# Patient Record
Sex: FEMALE | Race: WHITE | Employment: UNEMPLOYED | URBAN - METROPOLITAN AREA
[De-identification: names, ages, dates, MRNs, and addresses within clinical notes are randomized per-mention and may not be internally consistent; named-entity substitution may affect disease eponyms.]

---

## 2018-09-05 ENCOUNTER — OFFICE VISIT (OUTPATIENT)
Dept: OBGYN | Age: 63
End: 2018-09-05

## 2018-09-05 ENCOUNTER — HOSPITAL ENCOUNTER (OUTPATIENT)
Age: 63
Setting detail: SPECIMEN
Discharge: HOME | End: 2018-09-05

## 2018-09-05 VITALS
BODY MASS INDEX: 20.83 KG/M2 | DIASTOLIC BLOOD PRESSURE: 60 MMHG | TEMPERATURE: 98.4 F | SYSTOLIC BLOOD PRESSURE: 130 MMHG | WEIGHT: 122 LBS | HEART RATE: 78 BPM | HEIGHT: 64 IN

## 2018-09-05 DIAGNOSIS — Z90.710 PAP SMEAR OF VAGINA WITH PREVIOUS HYSTERECTOMY FOR CANCER: ICD-10-CM

## 2018-09-05 DIAGNOSIS — Z12.31 ENCOUNTER FOR MAMMOGRAM TO ESTABLISH BASELINE MAMMOGRAM: ICD-10-CM

## 2018-09-05 DIAGNOSIS — Z01.419 ENCNTR FOR GYN EXAM (GENERAL) (ROUTINE) W/O ABN FINDINGS: Primary | ICD-10-CM

## 2018-09-05 DIAGNOSIS — Z08 PAP SMEAR OF VAGINA WITH PREVIOUS HYSTERECTOMY FOR CANCER: ICD-10-CM

## 2018-09-05 PROCEDURE — 99396 PREV VISIT EST AGE 40-64: CPT | Performed by: OBSTETRICS & GYNECOLOGY

## 2018-09-05 PROCEDURE — 88175 CYTOPATH C/V AUTO FLUID REDO: CPT

## 2018-09-05 PROCEDURE — 99213 OFFICE O/P EST LOW 20 MIN: CPT | Performed by: OBSTETRICS & GYNECOLOGY

## 2018-09-05 NOTE — PROGRESS NOTES
Chief complaint : 58 year- old presents for well woman exam.     Present illness :    G1, P1,Ab0. Doing well. Periods:  None, hysterectomy. Sexually active : yes . No abdominal or pelvic pain. No dyspareunia. No abnormal vaginal  discharge. Previous Pap smears normal. Last Pap smear 2014 or so, had hysterectomy at [facility] for ERIKA III. Uriah Meza did the TLH< BSO, there was no residual dysplasia according to his notes. No urinary or GI symptoms. Medical/ surgical history and medications reviewed. Has apparently never had a mammogram, will order that. History of TB treated. Translation phone utilized for this entire visit today. ROS negative    Examination :  Vital signs reviewed. GA : Healthy. Oriented x 3 no distress. HEENT : hearing grossly intact, no jaundice, no oral lesions or nasal discharge. Neck : Supple. Thyroid : normal, no goiter. Breasts : no masses. No skin changes or lymphadenopathy. No nipple discharge. Abdomen :Soft. No masses. No organomegaly. V&V : Normal female external genitalia. BUS: Normal.  PS : Adequate. Cervix : Absent, pap smear done of vault, TPP. Uterus : Absent  Adnexa : Clear, no masses.     IMP: Normal Gyne exam, S/P TLH, BSO for erika III    Plan: Mammogram, RTC yearly

## 2018-09-05 NOTE — PROGRESS NOTES
Pt had TPP today She had a hyst 2016 for [id number] We did a vault pap    Language line used for entire visit Mandarin #[id number]  Will need paps yearly and did order a mammogram.

## 2018-09-18 ENCOUNTER — HOSPITAL ENCOUNTER (OUTPATIENT)
Dept: MAMMOGRAPHY | Age: 63
Discharge: HOME | End: 2018-09-20

## 2018-09-18 DIAGNOSIS — Z12.31 ENCOUNTER FOR MAMMOGRAM TO ESTABLISH BASELINE MAMMOGRAM: ICD-10-CM

## 2018-09-18 DIAGNOSIS — Z01.419 ENCNTR FOR GYN EXAM (GENERAL) (ROUTINE) W/O ABN FINDINGS: ICD-10-CM

## 2018-09-18 PROCEDURE — 77067 SCR MAMMO BI INCL CAD: CPT

## 2019-01-14 ENCOUNTER — OFFICE VISIT (OUTPATIENT)
Dept: FAMILY MEDICINE CLINIC | Age: 64
End: 2019-01-14

## 2019-01-14 VITALS
HEIGHT: 64 IN | TEMPERATURE: 98 F | DIASTOLIC BLOOD PRESSURE: 90 MMHG | BODY MASS INDEX: 21 KG/M2 | SYSTOLIC BLOOD PRESSURE: 138 MMHG | WEIGHT: 123 LBS | OXYGEN SATURATION: 97 % | HEART RATE: 75 BPM

## 2019-01-14 DIAGNOSIS — R05.3 CHRONIC COUGH: ICD-10-CM

## 2019-01-14 DIAGNOSIS — K21.9 GASTROESOPHAGEAL REFLUX DISEASE, ESOPHAGITIS PRESENCE NOT SPECIFIED: ICD-10-CM

## 2019-01-14 DIAGNOSIS — J98.4 RESTRICTIVE LUNG DISEASE: ICD-10-CM

## 2019-01-14 DIAGNOSIS — Z12.11 SCREENING FOR COLON CANCER: ICD-10-CM

## 2019-01-14 DIAGNOSIS — R07.9 CHEST PAIN, UNSPECIFIED TYPE: ICD-10-CM

## 2019-01-14 PROCEDURE — 93005 ELECTROCARDIOGRAM TRACING: CPT | Performed by: STUDENT IN AN ORGANIZED HEALTH CARE EDUCATION/TRAINING PROGRAM

## 2019-01-14 PROCEDURE — 99213 OFFICE O/P EST LOW 20 MIN: CPT | Performed by: STUDENT IN AN ORGANIZED HEALTH CARE EDUCATION/TRAINING PROGRAM

## 2019-01-14 PROCEDURE — 93010 ELECTROCARDIOGRAM REPORT: CPT | Performed by: STUDENT IN AN ORGANIZED HEALTH CARE EDUCATION/TRAINING PROGRAM

## 2019-01-14 RX ORDER — OMEPRAZOLE 20 MG/1
20 CAPSULE, DELAYED RELEASE ORAL
Qty: 30 CAPSULE | Refills: 0 | Status: SHIPPED | OUTPATIENT
Start: 2019-01-14 | End: 2019-02-11 | Stop reason: SDUPTHER

## 2019-01-14 ASSESSMENT — PATIENT HEALTH QUESTIONNAIRE - PHQ9
1. LITTLE INTEREST OR PLEASURE IN DOING THINGS: 0
SUM OF ALL RESPONSES TO PHQ QUESTIONS 1-9: 0
SUM OF ALL RESPONSES TO PHQ QUESTIONS 1-9: 0
SUM OF ALL RESPONSES TO PHQ9 QUESTIONS 1 & 2: 0
2. FEELING DOWN, DEPRESSED OR HOPELESS: 0

## 2019-01-18 ASSESSMENT — ENCOUNTER SYMPTOMS
WHEEZING: 0
NAUSEA: 0
COUGH: 1
CHEST TIGHTNESS: 0
CONSTIPATION: 0
SHORTNESS OF BREATH: 0
DIARRHEA: 0
VOMITING: 0

## 2019-01-22 ENCOUNTER — HOSPITAL ENCOUNTER (OUTPATIENT)
Age: 64
Setting detail: SPECIMEN
Discharge: HOME | End: 2019-01-22

## 2019-01-22 DIAGNOSIS — Z12.11 SCREENING FOR COLON CANCER: ICD-10-CM

## 2019-01-22 LAB
CONTROL: POSITIVE
HEMOCCULT STL QL: NEGATIVE

## 2019-01-22 PROCEDURE — 82274 ASSAY TEST FOR BLOOD FECAL: CPT

## 2019-02-11 DIAGNOSIS — K21.9 GASTROESOPHAGEAL REFLUX DISEASE, ESOPHAGITIS PRESENCE NOT SPECIFIED: ICD-10-CM

## 2019-02-11 RX ORDER — OMEPRAZOLE 20 MG/1
20 CAPSULE, DELAYED RELEASE ORAL
Qty: 30 CAPSULE | Refills: 0 | Status: SHIPPED | OUTPATIENT
Start: 2019-02-11 | End: 2019-09-27

## 2019-03-26 ENCOUNTER — HOSPITAL ENCOUNTER (EMERGENCY)
Age: 64
Discharge: HOME | End: 2019-03-27
Attending: EMERGENCY MEDICINE

## 2019-03-26 ENCOUNTER — APPOINTMENT (OUTPATIENT)
Dept: CT IMAGING | Age: 64
End: 2019-03-26

## 2019-03-26 DIAGNOSIS — N39.0 URINARY TRACT INFECTION IN FEMALE: Primary | ICD-10-CM

## 2019-03-26 DIAGNOSIS — R55 NEAR SYNCOPE: ICD-10-CM

## 2019-03-26 LAB
ALBUMIN SERPL-MCNC: 4.4 G/DL (ref 3.5–5.2)
ALP BLD-CCNC: 113 U/L (ref 35–104)
ALT SERPL-CCNC: 13 U/L (ref 0–32)
ANION GAP SERPL CALCULATED.3IONS-SCNC: 10 MMOL/L (ref 7–16)
AST SERPL-CCNC: 24 U/L (ref 0–31)
BACTERIA: ABNORMAL /HPF
BASOPHILS ABSOLUTE: 0.04 E9/L (ref 0–0.2)
BASOPHILS RELATIVE PERCENT: 0.5 % (ref 0–2)
BILIRUB SERPL-MCNC: 0.3 MG/DL (ref 0–1.2)
BILIRUBIN URINE: NEGATIVE
BLOOD, URINE: NEGATIVE
BUN BLDV-MCNC: 13 MG/DL (ref 8–23)
CALCIUM SERPL-MCNC: 9.2 MG/DL (ref 8.6–10.2)
CHLORIDE BLD-SCNC: 101 MMOL/L (ref 98–107)
CLARITY: CLEAR
CO2: 28 MMOL/L (ref 22–29)
COLOR: YELLOW
CREAT SERPL-MCNC: 0.5 MG/DL (ref 0.5–1)
EOSINOPHILS ABSOLUTE: 0.07 E9/L (ref 0.05–0.5)
EOSINOPHILS RELATIVE PERCENT: 0.9 % (ref 0–6)
GFR AFRICAN AMERICAN: >60
GFR NON-AFRICAN AMERICAN: >60 ML/MIN/1.73
GLUCOSE BLD-MCNC: 123 MG/DL (ref 74–99)
GLUCOSE URINE: NEGATIVE MG/DL
HCT VFR BLD CALC: 41.9 % (ref 34–48)
HEMOGLOBIN: 13.5 G/DL (ref 11.5–15.5)
IMMATURE GRANULOCYTES #: 0.03 E9/L
IMMATURE GRANULOCYTES %: 0.4 % (ref 0–5)
KETONES, URINE: ABNORMAL MG/DL
LEUKOCYTE ESTERASE, URINE: ABNORMAL
LYMPHOCYTES ABSOLUTE: 1.63 E9/L (ref 1.5–4)
LYMPHOCYTES RELATIVE PERCENT: 20 % (ref 20–42)
MAGNESIUM: 2.4 MG/DL (ref 1.6–2.6)
MCH RBC QN AUTO: 29.7 PG (ref 26–35)
MCHC RBC AUTO-ENTMCNC: 32.2 % (ref 32–34.5)
MCV RBC AUTO: 92.3 FL (ref 80–99.9)
MONOCYTES ABSOLUTE: 0.4 E9/L (ref 0.1–0.95)
MONOCYTES RELATIVE PERCENT: 4.9 % (ref 2–12)
NEUTROPHILS ABSOLUTE: 5.97 E9/L (ref 1.8–7.3)
NEUTROPHILS RELATIVE PERCENT: 73.3 % (ref 43–80)
NITRITE, URINE: NEGATIVE
PDW BLD-RTO: 13.1 FL (ref 11.5–15)
PH UA: 6.5 (ref 5–9)
PLATELET # BLD: 308 E9/L (ref 130–450)
PMV BLD AUTO: 11.3 FL (ref 7–12)
POTASSIUM SERPL-SCNC: 4.7 MMOL/L (ref 3.5–5)
PROTEIN UA: NEGATIVE MG/DL
RBC # BLD: 4.54 E12/L (ref 3.5–5.5)
RBC UA: ABNORMAL /HPF (ref 0–2)
SODIUM BLD-SCNC: 139 MMOL/L (ref 132–146)
SPECIFIC GRAVITY UA: 1.02 (ref 1–1.03)
TOTAL PROTEIN: 7.9 G/DL (ref 6.4–8.3)
TROPONIN: <0.01 NG/ML (ref 0–0.03)
UROBILINOGEN, URINE: 1 E.U./DL
WBC # BLD: 8.1 E9/L (ref 4.5–11.5)
WBC UA: ABNORMAL /HPF (ref 0–5)

## 2019-03-26 PROCEDURE — 83735 ASSAY OF MAGNESIUM: CPT

## 2019-03-26 PROCEDURE — 80053 COMPREHEN METABOLIC PANEL: CPT

## 2019-03-26 PROCEDURE — 85025 COMPLETE CBC W/AUTO DIFF WBC: CPT

## 2019-03-26 PROCEDURE — 36415 COLL VENOUS BLD VENIPUNCTURE: CPT

## 2019-03-26 PROCEDURE — 84484 ASSAY OF TROPONIN QUANT: CPT

## 2019-03-26 PROCEDURE — 70450 CT HEAD/BRAIN W/O DYE: CPT

## 2019-03-26 PROCEDURE — 81001 URINALYSIS AUTO W/SCOPE: CPT

## 2019-03-26 PROCEDURE — 99284 EMERGENCY DEPT VISIT MOD MDM: CPT

## 2019-03-26 PROCEDURE — 93005 ELECTROCARDIOGRAM TRACING: CPT | Performed by: NURSE PRACTITIONER

## 2019-03-27 VITALS
SYSTOLIC BLOOD PRESSURE: 138 MMHG | OXYGEN SATURATION: 96 % | DIASTOLIC BLOOD PRESSURE: 70 MMHG | BODY MASS INDEX: 19.99 KG/M2 | WEIGHT: 120 LBS | HEIGHT: 65 IN | HEART RATE: 68 BPM | TEMPERATURE: 97.6 F | RESPIRATION RATE: 16 BRPM

## 2019-03-27 RX ORDER — CEPHALEXIN 500 MG/1
500 CAPSULE ORAL 2 TIMES DAILY
Qty: 14 CAPSULE | Refills: 0 | Status: SHIPPED | OUTPATIENT
Start: 2019-03-27 | End: 2019-04-03

## 2019-03-29 LAB
EKG ATRIAL RATE: 65 BPM
EKG P AXIS: 52 DEGREES
EKG P-R INTERVAL: 172 MS
EKG Q-T INTERVAL: 430 MS
EKG QRS DURATION: 86 MS
EKG QTC CALCULATION (BAZETT): 447 MS
EKG R AXIS: -15 DEGREES
EKG T AXIS: 28 DEGREES
EKG VENTRICULAR RATE: 65 BPM

## 2019-04-08 ENCOUNTER — OFFICE VISIT (OUTPATIENT)
Dept: PULMONOLOGY | Age: 64
End: 2019-04-08

## 2019-04-08 VITALS
WEIGHT: 121 LBS | OXYGEN SATURATION: 91 % | SYSTOLIC BLOOD PRESSURE: 135 MMHG | RESPIRATION RATE: 18 BRPM | HEART RATE: 75 BPM | HEIGHT: 63 IN | BODY MASS INDEX: 21.44 KG/M2 | TEMPERATURE: 97.5 F | DIASTOLIC BLOOD PRESSURE: 63 MMHG

## 2019-04-08 DIAGNOSIS — J98.4 RESTRICTIVE LUNG DISEASE: ICD-10-CM

## 2019-04-08 DIAGNOSIS — Z60.3 IMMIGRANT WITH LANGUAGE DIFFICULTY: ICD-10-CM

## 2019-04-08 DIAGNOSIS — J92.0 ASBESTOS-INDUCED PLEURAL PLAQUE: ICD-10-CM

## 2019-04-08 DIAGNOSIS — R05.3 CHRONIC COUGH: ICD-10-CM

## 2019-04-08 DIAGNOSIS — J44.9 CHRONIC OBSTRUCTIVE PULMONARY DISEASE, UNSPECIFIED COPD TYPE (HCC): Primary | ICD-10-CM

## 2019-04-08 LAB
EXPIRATORY TIME: NORMAL SEC
FEF 25-75% %PRED-PRE: NORMAL L/SEC
FEF 25-75% PRED: NORMAL L/SEC
FEF 25-75%-PRE: NORMAL L/SEC
FEV1 %PRED-PRE: 91 %
FEV1 PRED: 2.13 L
FEV1/FVC %PRED-PRE: 111 %
FEV1/FVC PRED: 80 %
FEV1/FVC: 89 %
FEV1: 1.96 L
FVC %PRED-PRE: 82 %
FVC PRED: 2.68 L
FVC: 2.2 L
PEF %PRED-PRE: NORMAL L/SEC
PEF PRED: NORMAL L/SEC
PEF-PRE: NORMAL L/SEC

## 2019-04-08 PROCEDURE — 99203 OFFICE O/P NEW LOW 30 MIN: CPT | Performed by: INTERNAL MEDICINE

## 2019-04-08 PROCEDURE — 94010 BREATHING CAPACITY TEST: CPT | Performed by: INTERNAL MEDICINE

## 2019-04-08 PROCEDURE — 99204 OFFICE O/P NEW MOD 45 MIN: CPT | Performed by: INTERNAL MEDICINE

## 2019-04-08 SDOH — SOCIAL STABILITY - SOCIAL INSECURITY: ACCULTURATION DIFFICULTY: Z60.3

## 2019-04-08 ASSESSMENT — PULMONARY FUNCTION TESTS
FEV1: 1.96
FVC_PREDICTED: 2.68
FEV1/FVC_PERCENT_PREDICTED_PRE: 111
FEV1/FVC: 89
FVC_PERCENT_PREDICTED_PRE: 82
FVC: 2.20
FEV1/FVC_PREDICTED: 80
FEV1_PREDICTED: 2.13
FEV1_PERCENT_PREDICTED_PRE: 91

## 2019-04-08 NOTE — PROGRESS NOTES
Department of Internal Medicine  Division of Pulmonary, Critical Care & Sleep Medicine  Pulmonary [address] [facility]                                             Pulmonary Clinic Consult     I had the pleasure of seeing  Elke Brooks in the [facility] regarding their history of asbestosis     Chief Complaint   Patient presents with    Establish Care    Cough       HISTORY OF PRESENT ILLNESS:    Elke Brooks is a 58y.o. year old  Who never smoke or seconds smoke around her     The Patient comes in with SOB that has been going on the last few years and she had work up 2 years ago for TB and asbestsos and she has chronic     She  has cough ,productive for clear  Sputum and it is more in the     She had  has + PPD,abnormal CT/CXR with cough. I. The patients' cough has not been productive of any sputum or blood. She has had no fever noted. She has had no associated  sore throat, HA, ear discomfort. . She does not smoke tobacco. She has been in the [place] for quite some time, she has had no recent travel. Her family member is concerned as her cough has been persistent and brings her to [facility] for order for an outpatient CXR. The CXR was reported as abnormal and thus a CT scan was performed. The CT scan was reviewed and show pleural plaque without adenopathy. Using a  we note that she has not TB exposure or history. She from [place]. On questioning she was a  for 30 + years with mainly soft metals. Her  however is a  in [place] for 40 + years and has significant exposure to asbestos exposure. 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 She has had the cough for 10 years.  No GERD    She has CT scan shows multiple plaques and also bronchoscopy done with bacterial growth ,negative AFB         ALLERGIES:  No Known Allergies    PAST MEDICAL HISTORY:       Diagnosis Date    muscles intact. External canals are patent and no discharge was appreciated. Septum was midline,   mucosa was without erythema, exudates or cobblestoning. No thrush was noted. Neck: Supple without thyromegaly. No elevated JVP. Trachea was midline. No carotid bruits were auscultated. Respiratory: rales   Cardiovascular: Regular without murmur, clicks, gallops or rubs. There is no left or right ventricular heave. Pulses:  Carotid, radial and femoral pulses were equally bilaterally. Abdomen: Slightly rounded and soft without organomegaly. No rebound, rigidity or guarding was appreciated. Lymphatic: No lymphadenopathy. Musculoskeletal: no joint deformity     Extremities:no edema   Skin:  Warm and dry. Good color, turgor and pigmentation. No lesions or scars. Neurological/Psychiatric: The patient's general behavior, level of consciousness, thought content and emotional status is normal.  Cranial nerves II-XII are intact. DATA:                 IMPRESSION:    1-Asbestosis   2-multpie nodules    3-calcified plaques   4-Positive PPD             PLAN:      -T spot test   -repeat CT chest   -for cough already tried inhalers and did not help   _ will do eosinophilic ,.Sed rate ,IgE and see her in 4 months     Flu and Pneumovax     Thank you for allowing me to participate in [facility]. I will keep following with you ,should you have any concerns ,please contact me at [phone]     Sincerely,        Sha Boothe MD  Pulmonary & Critical Care Medicine     NOTE: This report was transcribed using voice recognition software. Every effort was made to ensure accuracy; however, inadvertent computerized transcription errors may be present.

## 2019-04-08 NOTE — PROGRESS NOTES
New pt in office today to see Dr. Jose Ogden for Restrictive Lung disease;chronic cough. Pt traveling out of country to [place] in 1 week thru end of August. Requesting Chest CT and labwork be done post trip. Office to arrange CT Chest for early Sept; will mail appointment date/time letter to pt home. To follow up appt in office in Later Sept. Appt given for 9/23/19 for follow up in office.

## 2019-07-03 ENCOUNTER — TELEPHONE (OUTPATIENT)
Dept: PULMONOLOGY | Age: 64
End: 2019-07-03

## 2019-09-05 ENCOUNTER — OFFICE VISIT (OUTPATIENT)
Dept: OBGYN | Age: 64
End: 2019-09-05

## 2019-09-05 ENCOUNTER — HOSPITAL ENCOUNTER (OUTPATIENT)
Age: 64
Discharge: HOME | End: 2019-09-07

## 2019-09-05 VITALS
SYSTOLIC BLOOD PRESSURE: 138 MMHG | HEART RATE: 75 BPM | RESPIRATION RATE: 16 BRPM | TEMPERATURE: 98.2 F | DIASTOLIC BLOOD PRESSURE: 70 MMHG | WEIGHT: 122 LBS | BODY MASS INDEX: 21.61 KG/M2

## 2019-09-05 DIAGNOSIS — R07.89 CHEST DISCOMFORT: ICD-10-CM

## 2019-09-05 DIAGNOSIS — Z01.419 ENCNTR FOR GYN EXAM (GENERAL) (ROUTINE) W/O ABN FINDINGS: Primary | ICD-10-CM

## 2019-09-05 DIAGNOSIS — Z90.710 PAP SMEAR OF VAGINA WITH PREVIOUS HYSTERECTOMY FOR CANCER: ICD-10-CM

## 2019-09-05 DIAGNOSIS — Z08 PAP SMEAR OF VAGINA WITH PREVIOUS HYSTERECTOMY FOR CANCER: ICD-10-CM

## 2019-09-05 DIAGNOSIS — Z12.31 ENCOUNTER FOR SCREENING MAMMOGRAM FOR BREAST CANCER: ICD-10-CM

## 2019-09-05 PROCEDURE — 99213 OFFICE O/P EST LOW 20 MIN: CPT | Performed by: OBSTETRICS & GYNECOLOGY

## 2019-09-05 PROCEDURE — 88175 CYTOPATH C/V AUTO FLUID REDO: CPT

## 2019-09-05 PROCEDURE — 99397 PER PM REEVAL EST PAT 65+ YR: CPT | Performed by: OBSTETRICS & GYNECOLOGY

## 2019-09-05 NOTE — PROGRESS NOTES
cyracom I pad for mandarin used for entire visit.  number [id number]  Assisted with pelvic exam, pap smear obtained, labeled @ and hand delivered to lab. Mammogram rx faxed to [facility]. REFERRAL MADE FOR im clinic. Appointment given for 9/27/19. Discharge instructions given by dr Musa Novak.

## 2019-09-05 NOTE — PROGRESS NOTES
Chief complaint : 58 year- old presents for well woman exam.     Present illness :    G1, P1,Ab0. Doing well. Periods:  None, hysterectomy. Sexually active : no  No abdominal or pelvic pain. No dyspareunia. No abnormal vaginal  discharge. Previous Pap smears normal. Last Pap smear 2018, had hysterectomy at [facility] for ERIKA III. Ky Zamudio Every did the TLH< BSO, there was no residual dysplasia according to his notes. No urinary or GI symptoms. Medical/ surgical history and medications reviewed. Mammogram due, will order that. History of TB treated. Translation phone utilized for this entire visit today. ROS: Occasional chest discomfort, will make medical appt. Examination :  Vital signs reviewed. GA : Healthy. Oriented x 3 no distress. HEENT : hearing grossly intact, no jaundice, no oral lesions or nasal discharge. Neck : Supple. Thyroid : normal, no goiter. Breasts : no masses. No skin changes or lymphadenopathy. No nipple discharge. Abdomen :Soft. No masses. No organomegaly. V&V : Normal female external genitalia. BUS: Normal.  PS : Adequate. Cervix : Absent, pap smear done of vault, TPP. Uterus : Absent  Adnexa : Clear, no masses. IMP: Normal Gyne exam, S/P TLH, BSO for erika III    Plan: Mammogram, RTC yearly, Continue yearly pap smears of vault. Refer to IM for chest discomfort.

## 2019-09-23 ENCOUNTER — HOSPITAL ENCOUNTER (OUTPATIENT)
Dept: MAMMOGRAPHY | Age: 64
Discharge: HOME | End: 2019-09-25

## 2019-09-23 DIAGNOSIS — Z01.419 ENCNTR FOR GYN EXAM (GENERAL) (ROUTINE) W/O ABN FINDINGS: ICD-10-CM

## 2019-09-23 DIAGNOSIS — Z12.31 ENCOUNTER FOR SCREENING MAMMOGRAM FOR BREAST CANCER: ICD-10-CM

## 2019-09-23 PROCEDURE — 77067 SCR MAMMO BI INCL CAD: CPT

## 2019-09-27 ENCOUNTER — HOSPITAL ENCOUNTER (OUTPATIENT)
Age: 64
Discharge: HOME | End: 2019-09-27

## 2019-09-27 ENCOUNTER — OFFICE VISIT (OUTPATIENT)
Dept: INTERNAL MEDICINE | Age: 64
End: 2019-09-27

## 2019-09-27 VITALS
BODY MASS INDEX: 22.21 KG/M2 | HEART RATE: 70 BPM | TEMPERATURE: 98.2 F | RESPIRATION RATE: 16 BRPM | DIASTOLIC BLOOD PRESSURE: 81 MMHG | WEIGHT: 120.7 LBS | HEIGHT: 62 IN | SYSTOLIC BLOOD PRESSURE: 166 MMHG

## 2019-09-27 DIAGNOSIS — R74.8 ELEVATED ALKALINE PHOSPHATASE LEVEL: ICD-10-CM

## 2019-09-27 DIAGNOSIS — J92.0 ASBESTOS-INDUCED PLEURAL PLAQUE: ICD-10-CM

## 2019-09-27 DIAGNOSIS — R91.1 LUNG NODULE: ICD-10-CM

## 2019-09-27 DIAGNOSIS — R73.9 HYPERGLYCEMIA: ICD-10-CM

## 2019-09-27 DIAGNOSIS — J92.0 PLEURAL PLAQUE DUE TO ASBESTOS EXPOSURE: ICD-10-CM

## 2019-09-27 DIAGNOSIS — R74.8 ELEVATED ALKALINE PHOSPHATASE LEVEL: Primary | ICD-10-CM

## 2019-09-27 DIAGNOSIS — I10 ESSENTIAL HYPERTENSION: ICD-10-CM

## 2019-09-27 LAB
ALBUMIN SERPL-MCNC: 4.6 G/DL (ref 3.5–5.2)
ALP BLD-CCNC: 89 U/L (ref 35–104)
ALT SERPL-CCNC: 11 U/L (ref 0–32)
ANION GAP SERPL CALCULATED.3IONS-SCNC: 21 MMOL/L (ref 7–16)
AST SERPL-CCNC: 16 U/L (ref 0–31)
BILIRUB SERPL-MCNC: 0.5 MG/DL (ref 0–1.2)
BUN BLDV-MCNC: 14 MG/DL (ref 8–23)
CALCIUM SERPL-MCNC: 10 MG/DL (ref 8.6–10.2)
CHLORIDE BLD-SCNC: 102 MMOL/L (ref 98–107)
CO2: 24 MMOL/L (ref 22–29)
CREAT SERPL-MCNC: 0.6 MG/DL (ref 0.5–1)
EOSINOPHILS ABSOLUTE COUNT: 100 /UL (ref 50–250)
GAMMA GLUTAMYL TRANSFERASE: 14 U/L (ref 6–42)
GFR AFRICAN AMERICAN: >60
GFR NON-AFRICAN AMERICAN: >60 ML/MIN/1.73
GLUCOSE BLD-MCNC: 104 MG/DL (ref 74–99)
HBA1C MFR BLD: 5.7 %
POTASSIUM SERPL-SCNC: 4.8 MMOL/L (ref 3.5–5)
SODIUM BLD-SCNC: 147 MMOL/L (ref 132–146)
TOTAL PROTEIN: 8.6 G/DL (ref 6.4–8.3)

## 2019-09-27 PROCEDURE — 83036 HEMOGLOBIN GLYCOSYLATED A1C: CPT | Performed by: INTERNAL MEDICINE

## 2019-09-27 PROCEDURE — 82785 ASSAY OF IGE: CPT

## 2019-09-27 PROCEDURE — 82977 ASSAY OF GGT: CPT

## 2019-09-27 PROCEDURE — 99202 OFFICE O/P NEW SF 15 MIN: CPT | Performed by: INTERNAL MEDICINE

## 2019-09-27 PROCEDURE — 80053 COMPREHEN METABOLIC PANEL: CPT

## 2019-09-27 PROCEDURE — 99204 OFFICE O/P NEW MOD 45 MIN: CPT | Performed by: INTERNAL MEDICINE

## 2019-09-27 PROCEDURE — 36415 COLL VENOUS BLD VENIPUNCTURE: CPT

## 2019-09-27 PROCEDURE — 85048 AUTOMATED LEUKOCYTE COUNT: CPT

## 2019-09-27 RX ORDER — AMLODIPINE BESYLATE 5 MG/1
5 TABLET ORAL DAILY
Qty: 30 TABLET | Refills: 3 | Status: SHIPPED | OUTPATIENT
Start: 2019-09-27 | End: 2019-10-28 | Stop reason: SINTOL

## 2019-09-27 ASSESSMENT — ENCOUNTER SYMPTOMS
VOMITING: 0
SORE THROAT: 0
PHOTOPHOBIA: 0
BACK PAIN: 0
WHEEZING: 0
NAUSEA: 0
DIARRHEA: 0
BLOOD IN STOOL: 0
COUGH: 0
ABDOMINAL PAIN: 0
CONSTIPATION: 0
SHORTNESS OF BREATH: 0

## 2019-09-30 ENCOUNTER — HOSPITAL ENCOUNTER (OUTPATIENT)
Age: 64
Discharge: HOME | End: 2019-09-30

## 2019-09-30 DIAGNOSIS — J92.0 ASBESTOS-INDUCED PLEURAL PLAQUE: ICD-10-CM

## 2019-09-30 LAB — IGE: 24 KU/L

## 2019-09-30 PROCEDURE — 36415 COLL VENOUS BLD VENIPUNCTURE: CPT

## 2019-09-30 PROCEDURE — 86481 TB AG RESPONSE T-CELL SUSP: CPT

## 2019-10-03 LAB
COMMENT: NORMAL
REPORT: NORMAL

## 2019-10-10 ENCOUNTER — HOSPITAL ENCOUNTER (OUTPATIENT)
Dept: CT IMAGING | Age: 64
Discharge: HOME | End: 2019-10-12

## 2019-10-10 DIAGNOSIS — J92.0 ASBESTOS-INDUCED PLEURAL PLAQUE: ICD-10-CM

## 2019-10-10 PROCEDURE — 71250 CT THORAX DX C-: CPT

## 2019-10-28 ENCOUNTER — OFFICE VISIT (OUTPATIENT)
Dept: INTERNAL MEDICINE | Age: 64
End: 2019-10-28

## 2019-10-28 VITALS
RESPIRATION RATE: 20 BRPM | TEMPERATURE: 98.3 F | SYSTOLIC BLOOD PRESSURE: 136 MMHG | HEART RATE: 76 BPM | DIASTOLIC BLOOD PRESSURE: 80 MMHG | WEIGHT: 122 LBS | HEIGHT: 63 IN | BODY MASS INDEX: 21.62 KG/M2

## 2019-10-28 DIAGNOSIS — A15.9 TB (TUBERCULOSIS): Primary | ICD-10-CM

## 2019-10-28 PROCEDURE — 99212 OFFICE O/P EST SF 10 MIN: CPT | Performed by: INTERNAL MEDICINE

## 2019-10-28 ASSESSMENT — ENCOUNTER SYMPTOMS
COUGH: 0
BACK PAIN: 0
SORE THROAT: 0
SHORTNESS OF BREATH: 0
ABDOMINAL PAIN: 0

## 2019-11-07 ENCOUNTER — TELEPHONE (OUTPATIENT)
Dept: INTERNAL MEDICINE | Age: 64
End: 2019-11-07

## 2019-12-19 ENCOUNTER — OFFICE VISIT (OUTPATIENT)
Dept: PULMONOLOGY | Age: 64
End: 2019-12-19

## 2019-12-19 VITALS
RESPIRATION RATE: 18 BRPM | WEIGHT: 119 LBS | HEIGHT: 63 IN | SYSTOLIC BLOOD PRESSURE: 125 MMHG | DIASTOLIC BLOOD PRESSURE: 59 MMHG | BODY MASS INDEX: 21.09 KG/M2 | HEART RATE: 87 BPM | OXYGEN SATURATION: 95 %

## 2019-12-19 DIAGNOSIS — J98.4 RESTRICTIVE LUNG DISEASE: ICD-10-CM

## 2019-12-19 DIAGNOSIS — R05.3 CHRONIC COUGH: ICD-10-CM

## 2019-12-19 DIAGNOSIS — R91.1 LUNG NODULE: ICD-10-CM

## 2019-12-19 DIAGNOSIS — Z22.7 TB LUNG, LATENT: Primary | ICD-10-CM

## 2019-12-19 PROCEDURE — 99214 OFFICE O/P EST MOD 30 MIN: CPT | Performed by: INTERNAL MEDICINE

## 2019-12-19 PROCEDURE — 99213 OFFICE O/P EST LOW 20 MIN: CPT | Performed by: INTERNAL MEDICINE

## 2020-01-06 ENCOUNTER — OFFICE VISIT (OUTPATIENT)
Dept: INTERNAL MEDICINE | Age: 65
End: 2020-01-06

## 2020-01-06 VITALS
DIASTOLIC BLOOD PRESSURE: 65 MMHG | HEIGHT: 64 IN | BODY MASS INDEX: 20.81 KG/M2 | TEMPERATURE: 98.2 F | HEART RATE: 87 BPM | RESPIRATION RATE: 16 BRPM | WEIGHT: 121.9 LBS | SYSTOLIC BLOOD PRESSURE: 137 MMHG

## 2020-01-06 PROBLEM — I10 ESSENTIAL HYPERTENSION: Status: RESOLVED | Noted: 2019-09-27 | Resolved: 2020-01-06

## 2020-01-06 PROBLEM — R76.11 POSITIVE TB TEST: Status: ACTIVE | Noted: 2020-01-06

## 2020-01-06 PROCEDURE — 99212 OFFICE O/P EST SF 10 MIN: CPT | Performed by: INTERNAL MEDICINE

## 2020-01-06 PROCEDURE — 99213 OFFICE O/P EST LOW 20 MIN: CPT | Performed by: INTERNAL MEDICINE

## 2020-01-06 ASSESSMENT — PATIENT HEALTH QUESTIONNAIRE - PHQ9
SUM OF ALL RESPONSES TO PHQ QUESTIONS 1-9: 0
2. FEELING DOWN, DEPRESSED OR HOPELESS: 0
SUM OF ALL RESPONSES TO PHQ QUESTIONS 1-9: 0
SUM OF ALL RESPONSES TO PHQ9 QUESTIONS 1 & 2: 0
1. LITTLE INTEREST OR PLEASURE IN DOING THINGS: 0

## 2020-01-06 ASSESSMENT — ENCOUNTER SYMPTOMS
SHORTNESS OF BREATH: 0
COUGH: 0
ABDOMINAL PAIN: 0
CONSTIPATION: 0
BLOOD IN STOOL: 0
DIARRHEA: 0
SORE THROAT: 0

## 2020-01-06 NOTE — PROGRESS NOTES
[facility]  InternalMedicine Residency Program  ACC Note      SUBJECTIVE:  CC: had concerns including Check-Up. HPI:Nahum Jordan 71years old female with hx of asbestosis and lung nodule and hysterectomy due to DIONY III in [facility] presented to the [facility] for a routine visit. She speaks Mandarin only which I am fluent on. She is here with her son in law, who understands both [place] and Mandarin. She recently had Tspot test in Sep, result was positive. She was referred to ID at the end of October, however due to she was out of town, she just came back from [place], didn't see ID yet. Her first appointment will be Jan 22 10:45 a, with Dr. Joelle Alves. She was provided the appointment information and she will go for it. Review of Systems   Constitutional: Negative for appetite change, chills, fever and unexpected weight change. No night sweat   HENT: Negative for sore throat. Respiratory: Negative for cough and shortness of breath. Cardiovascular: Negative for chest pain and leg swelling. Gastrointestinal: Negative for abdominal pain, blood in stool, constipation and diarrhea. Genitourinary: Negative for difficulty urinating and dysuria. Neurological: Negative for dizziness and light-headedness. No current outpatient medications on file prior to visit. No current facility-administered medications on file prior to visit. OBJECTIVE:    VS: /65   Pulse 87   Temp 98.2 °F (36.8 °C) (Oral)   Resp 16   Ht 5' 4\" (1.626 m)   Wt 121 lb 14.4 oz (55.3 kg)   BMI 20.92 kg/m²   Physical Exam  Cardiovascular:      Rate and Rhythm: Normal rate and regular rhythm. Heart sounds: Normal heart sounds. Pulmonary:      Effort: Pulmonary effort is normal.      Breath sounds: Normal breath sounds. No wheezing or rhonchi. Abdominal:      General: There is no distension. Tenderness: There is no tenderness. Musculoskeletal: Normal range of motion.          General:

## 2020-06-26 ENCOUNTER — TELEPHONE (OUTPATIENT)
Dept: ADMINISTRATIVE | Age: 65
End: 2020-06-26

## 2020-06-29 ENCOUNTER — OFFICE VISIT (OUTPATIENT)
Dept: FAMILY MEDICINE CLINIC | Age: 65
End: 2020-06-29

## 2020-06-29 VITALS
WEIGHT: 124 LBS | HEIGHT: 63 IN | BODY MASS INDEX: 21.97 KG/M2 | OXYGEN SATURATION: 97 % | TEMPERATURE: 98.1 F | DIASTOLIC BLOOD PRESSURE: 60 MMHG | HEART RATE: 85 BPM | SYSTOLIC BLOOD PRESSURE: 120 MMHG

## 2020-06-29 PROCEDURE — 99213 OFFICE O/P EST LOW 20 MIN: CPT | Performed by: PHYSICIAN ASSISTANT

## 2020-06-29 RX ORDER — AMOXICILLIN AND CLAVULANATE POTASSIUM 875; 125 MG/1; MG/1
1 TABLET, FILM COATED ORAL 2 TIMES DAILY
Qty: 20 TABLET | Refills: 0 | Status: SHIPPED | OUTPATIENT
Start: 2020-06-29 | End: 2020-07-09

## 2020-06-29 ASSESSMENT — PATIENT HEALTH QUESTIONNAIRE - PHQ9
2. FEELING DOWN, DEPRESSED OR HOPELESS: 0
SUM OF ALL RESPONSES TO PHQ9 QUESTIONS 1 & 2: 0
SUM OF ALL RESPONSES TO PHQ QUESTIONS 1-9: 0
1. LITTLE INTEREST OR PLEASURE IN DOING THINGS: 0
SUM OF ALL RESPONSES TO PHQ QUESTIONS 1-9: 0

## 2020-06-29 NOTE — PROGRESS NOTES
with age. Ears:  External Ears: Normal pinna bilaterally. TM's & External Canals: Right TM with absent light reflex and moderate purulent effusion noted. Left TM translucent. No perforation bilaterally. Canals without swelling or exudate bilaterally. Nose:  No congestion of the nasal mucosa. Throat:  Posterior pharynx without injection, exudate, or tonsillar hypertrophy. Airway patient. Neck:  Normal ROM. Supple. No adenopathy. Respiratory:  CTAB without wheezing, rales, or rhonchi  CV: Regular rate and rhythm, normal heart sounds, without pathological murmurs, ectopy, gallops, or rubs. Skin:  Moist and warm without rashes or lesions. Lymphatic: No lymphangitis or adenopathy noted. Neurological:  Oriented. Motor functions intact. Lab / Imaging Results   (All laboratory and radiology results have been personally reviewed by myself)  Labs:  No results found for this visit on 06/29/20. Assessment / Plan     Impression(s):  1. Non-recurrent acute suppurative otitis media of right ear without spontaneous rupture of tympanic membrane      Disposition:  Disposition: Discharge to home. Script written for Augmentin, side effects discussed. Increase fluids and rest. Additional symptomatic relief discussed. F/u PCP in 5-7 days if symptoms persist. ED sooner if symptoms worsen or change. ED immediately with fever, severe/worsening ear pain, mastoid redness/tenderness, CP, dyspnea, or dysphagia. Pt is in agreement with this care plan. All questions answered.

## 2020-07-09 ENCOUNTER — OFFICE VISIT (OUTPATIENT)
Dept: INTERNAL MEDICINE | Age: 65
End: 2020-07-09

## 2020-07-09 VITALS
HEIGHT: 64 IN | SYSTOLIC BLOOD PRESSURE: 159 MMHG | OXYGEN SATURATION: 97 % | DIASTOLIC BLOOD PRESSURE: 95 MMHG | BODY MASS INDEX: 21.34 KG/M2 | HEART RATE: 93 BPM | RESPIRATION RATE: 16 BRPM | TEMPERATURE: 97.7 F | WEIGHT: 125 LBS

## 2020-07-09 PROBLEM — H66.001 NON-RECURRENT ACUTE SUPPURATIVE OTITIS MEDIA OF RIGHT EAR WITHOUT SPONTANEOUS RUPTURE OF TYMPANIC MEMBRANE: Status: ACTIVE | Noted: 2020-07-09

## 2020-07-09 PROCEDURE — 99213 OFFICE O/P EST LOW 20 MIN: CPT | Performed by: INTERNAL MEDICINE

## 2020-07-09 PROCEDURE — 99212 OFFICE O/P EST SF 10 MIN: CPT | Performed by: INTERNAL MEDICINE

## 2020-07-09 RX ORDER — CEFDINIR 300 MG/1
300 CAPSULE ORAL 2 TIMES DAILY
Qty: 20 CAPSULE | Refills: 0 | Status: SHIPPED | OUTPATIENT
Start: 2020-07-09 | End: 2020-07-19

## 2020-07-09 RX ORDER — FLUTICASONE PROPIONATE 50 MCG
1 SPRAY, SUSPENSION (ML) NASAL 2 TIMES DAILY
Qty: 1 BOTTLE | Refills: 1 | Status: SHIPPED
Start: 2020-07-09 | End: 2020-08-10 | Stop reason: SDUPTHER

## 2020-07-09 NOTE — PROGRESS NOTES
Int [id number] used for rooming process. Discharge instructions reviewed with patient per Dr. Perla Mccall. Patient directed to  to  AVS and schedule follow up appt.

## 2020-07-09 NOTE — PATIENT INSTRUCTIONS
Thank you for coming to your appointment today. Please make sure to do the following:  - Schedule your appointment in 4 weeks  - Continue the medications as listed  - schedule appointment with Dr Mignon Estevez    If your symptoms worsen or do not improve, call for a sooner appointment or call [phone] for the nurse's line.     Kyle Lee MD

## 2020-07-09 NOTE — PROGRESS NOTES
[facility]  Internal Medicine Residency Program  [facility] Note      SUBJECTIVE:  CC: had concerns including Food Intolerance (Pt says she may have some food poisoning) and Otalgia (Right Ear Pain, also ringing in the ear). HPI:Nahum Jordan presented to the [facility] for a routine visit  hx of asbestosis and lung nodule and positive PPD/IGRA, hysterectomy due to DIONY III in . She started having ringing in her R ear, hearing problem for the past 2 weeks. It started after she had a brief food poisoning after eating sea food. She had vomiting for 24 hrs. She was seen by walk-in clinic and was given Augmentin- finished 10 days, it helped but she is still in pain and has dizziness. No sore throat, no nasal discharge. Dizzy spells and ringiing in the ear. She gets dizzy when she moves her head to the right. She was prescribed Cefdinir for recurrent Otitis media. I also ordered sputum AFB and will coordinate with pulmonology If that failes and patient needs bronchoscopy. Review Of Systems:  General: no fevers, chills, weight loss or gain.    Ears/Nose/Throat: + R hearing loss, + tinnitus, no vertigo but slight dizziness, no nosebleed, slight nasal congestion, no rhinorrhea, sore throat  Respiratory: ++ chronic cough, no pleuritic chest pain, dyspnea, or wheezing  Cardiovascular: no chest pain, angina, dyspnea on exertion, orthopnea, PND, palpitations, or claudication  Gastrointestinal: no nausea, vomiting, heartburn, diarrhea, constipation, abdominal pain, hematochezia or melena  Genitourinary: no urinary urgency, frequency, dysuria, nocturia, hesitancy, or incontinence  Musculoskeletal: no arthritis, arthralgia, myalgia, weakness, or morning stiffness  Skin: no abnormal pigmentation, rash, itching, masses, hair or nail changes    Current Outpatient Medications on File Prior to Visit   Medication Sig Dispense Refill    amoxicillin-clavulanate (AUGMENTIN) 875-125 MG per tablet Take 1 tablet by mouth 2 times daily for 10 days (Patient not taking: Reported on 7/9/2020) 20 tablet 0     No current facility-administered medications on file prior to visit. OBJECTIVE:    VS: BP (!) 159/95   Pulse 93   Temp 97.7 °F (36.5 °C) (Oral)   Resp 16   Ht 5' 3.6\" (1.615 m)   Wt 125 lb (56.7 kg)   SpO2 97%   BMI 21.73 kg/m²   General appearance: Alert, Awake, Oriented times 3, no distress  L ear normal and R ear TM engorged and looks infected  Lungs: Lungs clear to auscultation bilaterally. No rhonchi, crackles or wheezes  Heart: S1 S2  Regular rate and rhythm. No rub, murmur or gallop  Abdomen: Abdomen soft, non-tender. non-distended BS normal. No masses, organomegaly, no guarding rebound or rigidity. Extremities: No edema, Peripheral pulses palpable 2/4    ASSESSMENT/PLAN:  Shruti Ghosh was seen today for food intolerance and otalgia. Diagnoses and all orders for this visit:    Recurrent acute suppurative otitis media of right ear without spontaneous rupture of tympanic membrane  -     cefdinir (OMNICEF) 300 MG capsule;  Take 1 capsule by mouth 2 times daily for 10 days  -     fluticasone (FLONASE) 50 MCG/ACT nasal spray; 1 spray by Each Nostril route 2 times daily    Positive TB test  -     Culture with Smear, Acid Fast Bacillius      RTC: 4 weeks    I have reviewed my findings and recommendations with Nathaly Martínez and Dr Vinita Schneider MD PGY-3  7/9/2020 2:01 PM

## 2020-07-09 NOTE — PROGRESS NOTES
Attending Physician Statement  I have discussed the case, including pertinent history and exam findings with the resident. I reviewed medical, surg, soc histories, meds and any pertinent labs. I agree with the assessment, plan and orders as documented by the resident. Patient here for evaluation of persistent OM right ear, had recent rx with augmentin, still has evidence of otitis, agree with oral cefdinir fr 10 days and if that is innefective would referral to ENT. Patient also needs follow up with ID and pulmonary for continued eval/rx for + T spot.

## 2020-07-13 ENCOUNTER — HOSPITAL ENCOUNTER (OUTPATIENT)
Age: 65
Discharge: HOME | End: 2020-07-13

## 2020-07-13 PROCEDURE — 87206 SMEAR FLUORESCENT/ACID STAI: CPT

## 2020-07-13 PROCEDURE — 87116 MYCOBACTERIA CULTURE: CPT

## 2020-07-13 PROCEDURE — 87015 SPECIMEN INFECT AGNT CONCNTJ: CPT

## 2020-07-14 ENCOUNTER — HOSPITAL ENCOUNTER (OUTPATIENT)
Age: 65
Discharge: HOME | End: 2020-07-14

## 2020-07-14 PROCEDURE — 87206 SMEAR FLUORESCENT/ACID STAI: CPT

## 2020-07-14 PROCEDURE — 87116 MYCOBACTERIA CULTURE: CPT

## 2020-07-14 PROCEDURE — 87015 SPECIMEN INFECT AGNT CONCNTJ: CPT

## 2020-07-15 ENCOUNTER — HOSPITAL ENCOUNTER (OUTPATIENT)
Age: 65
Discharge: HOME | End: 2020-07-15

## 2020-07-15 PROCEDURE — 87015 SPECIMEN INFECT AGNT CONCNTJ: CPT

## 2020-07-15 PROCEDURE — 87206 SMEAR FLUORESCENT/ACID STAI: CPT

## 2020-07-15 PROCEDURE — 87116 MYCOBACTERIA CULTURE: CPT

## 2020-07-15 PROCEDURE — 87153 DNA/RNA SEQUENCING: CPT

## 2020-07-15 PROCEDURE — 87077 CULTURE AEROBIC IDENTIFY: CPT

## 2020-08-10 ENCOUNTER — OFFICE VISIT (OUTPATIENT)
Dept: INTERNAL MEDICINE | Age: 65
End: 2020-08-10
Payer: MEDICAID

## 2020-08-10 VITALS
BODY MASS INDEX: 22.01 KG/M2 | SYSTOLIC BLOOD PRESSURE: 180 MMHG | WEIGHT: 124.2 LBS | HEART RATE: 86 BPM | HEIGHT: 63 IN | DIASTOLIC BLOOD PRESSURE: 82 MMHG | TEMPERATURE: 97.7 F | RESPIRATION RATE: 16 BRPM

## 2020-08-10 PROCEDURE — 99214 OFFICE O/P EST MOD 30 MIN: CPT | Performed by: INTERNAL MEDICINE

## 2020-08-10 PROCEDURE — 99202 OFFICE O/P NEW SF 15 MIN: CPT | Performed by: INTERNAL MEDICINE

## 2020-08-10 RX ORDER — AMLODIPINE BESYLATE 2.5 MG/1
2.5 TABLET ORAL DAILY
Qty: 30 TABLET | Refills: 5 | Status: SHIPPED
Start: 2020-08-10 | End: 2020-11-23 | Stop reason: SDUPTHER

## 2020-08-10 RX ORDER — FLUTICASONE PROPIONATE 50 MCG
1 SPRAY, SUSPENSION (ML) NASAL 2 TIMES DAILY
Qty: 1 BOTTLE | Refills: 3 | Status: SHIPPED
Start: 2020-08-10 | End: 2020-11-30

## 2020-08-10 NOTE — PROGRESS NOTES
[facility]  Internal Medicine Clinic    Attending Physician Statement  I have discussed the case, including pertinent history and exam findings with the resident. I have seen and examined the patient and the key elements of the encounter have been performed by me. I agree with the assessment, plan and orders as documented by the resident. I have reviewed all pertinent PMHx, PSHx, FamHx, SocialHx, medications, and allergies and updated history as appropriate. Patient here for routine follow up of medical problems. 1. Chronic cough with lung nodule 1.9 x 1.9 cm, with hx of pleural plaque/asbestosis. She had + PPD and + IGRA (9/2019) and bronchoscopy done 2016 with Dr. Mercy Multani (AFB stain and PCR never resulted that I can tell) and multiple trips to [place]. Dr. Elizabeth Acevedo discussed with Dr. Florentino Padilla (Infectious Disease) and plan is to repeat CT chest and possible bronchoscopy +/- bx with Dr. Abiola Chan in September. She did have 3 negative AFB at 2 weeks in July. 2. HTN: restart amlodipine at 2.5 mg (5 mg dose caused orthostatic symptoms)    Remainder of medical problems as per resident note.   Erica Pinto D.O.  8/10/2020 10:23 AM

## 2020-08-10 NOTE — PROGRESS NOTES
[facility]  Internal Medicine Residency Program  [facility] Note      SUBJECTIVE:  CC: had concerns including 1 Month Follow-Up; Ear Problem (rt ear); and Hypertension (blood pressure elevated at home and here). HPI:Nahum Jordan presented to the [facility] for a routine visit  Hx of asbestosis and lung nodule and positive PPD/IGRA, hysterectomy due to DIONY III in . Recently seen as ED follow up for R otitis media. Still feels a little better. Ran out of flonase. Also c/o ear wax on L. Given flonase refill and debrox. Extensive conversation regarding ? TB. She continues to have a chronic cough. She had a bronch 2016 inconclusive, agreeable to repeat Bronch as recommended by Dr Pearl Higuera. AFB on sputum x 2 weeks no growth. Already has an appointment set with Dr. [facility] [employer]    BP elevated. Started low dose amlodipine since last time higher doses dropped her BP. She also C/O musculoskeletal pain at the side of her neck. Will observe for now. Review Of Systems:  General: no fevers, chills, weight loss or gain. Ears/Nose/Throat: no hearing loss, tinnitus, vertigo, nosebleed, nasal congestion, rhinorrhea, sore throat  Respiratory: no cough, pleuritic chest pain, dyspnea, or wheezing  Cardiovascular: no chest pain, angina, dyspnea on exertion, orthopnea, PND, palpitations, or claudication  Gastrointestinal: no nausea, vomiting, heartburn, diarrhea, constipation, abdominal pain, hematochezia or melena  Genitourinary: no urinary urgency, frequency, dysuria, nocturia, hesitancy, or incontinence  Musculoskeletal: no arthritis, arthralgia, myalgia, weakness, or morning stiffness  Skin: no abnormal pigmentation, rash, itching, masses, hair or nail changes    No current outpatient medications on file prior to visit. No current facility-administered medications on file prior to visit.         OBJECTIVE:    VS: BP (!) 180/82   Pulse 86   Temp 97.7 °F (36.5 °C) (Oral)   Resp 16   Ht 5' 3\" (1.6 m)   Wt 124 lb 3.2 oz (56.3 kg)   BMI 22.00 kg/m²   General appearance: Alert, Awake, Oriented times 3, no distress  Lungs: Lungs clear to auscultation bilaterally. No rhonchi, crackles or wheezes  Heart: S1 S2  Regular rate and rhythm. No rub, murmur or gallop  Abdomen: Abdomen soft, non-tender. non-distended BS normal. No masses, organomegaly, no guarding rebound or rigidity. Extremities: No edema, Peripheral pulses palpable 2/4  Ears: R TM normal, L ear wax present    ASSESSMENT/PLAN:  Kalin Kumar was seen today for 1 month follow-up, ear problem and hypertension. Diagnoses and all orders for this visit:    Essential hypertension  -     amLODIPine (NORVASC) 2.5 MG tablet;  Take 1 tablet by mouth daily    Recurrent acute suppurative otitis media of right ear without spontaneous rupture of tympanic membrane  -     fluticasone (FLONASE) 50 MCG/ACT nasal spray; 1 spray by Each Nostril route 2 times daily    Impacted cerumen, left ear  -     carbamide peroxide (DEBROX) 6.5 % otic solution; Place 5 drops into the left ear 2 times daily        RTC: 6 weeks    I have reviewed my findings and recommendations with Kalin Jordan and Dr Silvestre Love MD PGY-3  8/10/2020 1:23 PM

## 2020-08-10 NOTE — PATIENT INSTRUCTIONS
Thank you for coming to your appointment today. Please make sure to do the following:    - Schedule your appointment in 6 weeks  - schedule appointment with Dr. Amaury Hansen on September 14  - Get CT scan done before September 14  - Continue the medications as listed    If your symptoms worsen or do not improve, call for a sooner appointment or call [phone] for the nurse's line.     Bonilla Musa MD

## 2020-08-26 LAB
Lab: NORMAL
Lab: NORMAL
REPORT: NORMAL
REPORT: NORMAL
THIS TEST SENT TO: NORMAL
THIS TEST SENT TO: NORMAL

## 2020-08-31 ENCOUNTER — HOSPITAL ENCOUNTER (OUTPATIENT)
Dept: CT IMAGING | Age: 65
Discharge: HOME | End: 2020-09-02

## 2020-08-31 PROCEDURE — 71250 CT THORAX DX C-: CPT

## 2020-09-01 LAB
AFB CULTURE (MYCOBACTERIA): NORMAL
AFB SMEAR: NORMAL

## 2020-09-08 ENCOUNTER — OFFICE VISIT (OUTPATIENT)
Dept: PULMONOLOGY | Age: 65
End: 2020-09-08
Payer: MEDICAID

## 2020-09-08 VITALS
SYSTOLIC BLOOD PRESSURE: 187 MMHG | HEIGHT: 63 IN | WEIGHT: 124 LBS | HEART RATE: 85 BPM | BODY MASS INDEX: 21.97 KG/M2 | OXYGEN SATURATION: 97 % | RESPIRATION RATE: 18 BRPM | TEMPERATURE: 97.1 F | DIASTOLIC BLOOD PRESSURE: 85 MMHG

## 2020-09-08 PROCEDURE — 99214 OFFICE O/P EST MOD 30 MIN: CPT | Performed by: INTERNAL MEDICINE

## 2020-09-08 PROCEDURE — 99213 OFFICE O/P EST LOW 20 MIN: CPT | Performed by: INTERNAL MEDICINE

## 2020-09-08 NOTE — PROGRESS NOTES
Department of Internal Medicine  Division of Pulmonary, Critical Care & Sleep Medicine  Pulmonary [facility]                                             Pulmonary Clinic Consult     I had the pleasure of seeing  Isaac Buck in the [facility] regarding their history of asbestosis     Chief Complaint   Patient presents with    Follow-up     1 year    COPD       HISTORY OF PRESENT ILLNESS:    Isaac Buck is a 76y.o. year old  Who never smoke or seconds smoke around her     The Patient comes in with SOB that has been going on the last few years and she had work up 2 years ago for TB and asbestsos and she has chronic     She  has cough ,productive for clear  Sputum and it is more in the     She had  has + PPD,abnormal CT/CXR with cough. I. The patients' cough has not been productive of any sputum or blood. She has had no fever noted. She has had no associated  sore throat, HA, ear discomfort. . She does not smoke tobacco. She has been in the [facility] for quite some time, she has had no recent travel. Her family member is concerned as her cough has been persistent and brings her to [facility] for order for an outpatient CXR. The CXR was reported as abnormal and thus a CT scan was performed. The CT scan was reviewed and show pleural plaque without adenopathy. Using a  we note that she has not TB exposure or history. She from [place]. On questioning she was a  for 30 + years with mainly soft metals. Her  however is a  in [place] for 40 + years and has significant exposure to asbestos exposure. 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 She has had the cough for 10 years.  No GERD    She has CT scan shows multiple plaques and also bronchoscopy done with bacterial growth ,negative AFB     Today visit  She came for follow up Ct as she ahd CT in 8 /2020 and shows increase left lung GGO    is daughter and she agreed that her daughter translate and help with decision  and she speak little english ,she agreed for daughter  to interpret   Ct shows some growth in left lung     ALLERGIES:  No Known Allergies    PAST MEDICAL HISTORY:       Diagnosis Date    TB (pulmonary tuberculosis)        MEDICATIONS:   Current Outpatient Medications   Medication Sig Dispense Refill    amLODIPine (NORVASC) 2.5 MG tablet Take 1 tablet by mouth daily 30 tablet 5    fluticasone (FLONASE) 50 MCG/ACT nasal spray 1 spray by Each Nostril route 2 times daily 1 Bottle 3    carbamide peroxide (DEBROX) 6.5 % otic solution Place 5 drops into the left ear 2 times daily 1 Bottle 1     No current facility-administered medications for this visit. SOCIAL AND OCCUPATIONAL HEALTH:  Social History     Tobacco Use   Smoking Status Never Smoker   Smokeless Tobacco Never Used     TB :  Pets   Industrial exposure:  Birds :    SURGICAL HISTORY:   Past Surgical History:   Procedure Laterality Date    BRONCHOSCOPY  12/14/2016    HYSTERECTOMY      LEEP  7/23/2014    EXAM       FAMILY HISTORY:   Lung cancer:NO   DVT or PE No     REVIEW OF SYSTEMS:  Constitutional: General health is good . There has been no weight changes. No fevers, fatigue or weakness. Head: Patient denies any history of trauma, convulsive disorder or syncope. Skin:  Patient denies history of changes in pigmentation, eruptions or pruritus. No easy bruising or bleeding. EENT: no nasal congestion   Cardiovascular ,No chest pain ,No edema ,  Respiration:SOB:+  ,KHAN :+  Gastrointestinal:No GI bleed ,no melena  ,no hematemesis    Musculoskeletal: no joint pain ,no swelling  Neurological:no , syncope. Denies twitching, convulsions, loss of consciousness or memory. Endocrine:  . No history of goiter, exophthalmos or dryness of skin. The patient has no history of diabetes.     Hematopoietic:  No history of bleeding disorders or easy bruising. Rheumatic:  No connective tissue disease history or polyarthritis/inflammatory joint disease. PHYSICAL EXAMINATION:  Vitals:    09/08/20 0958   BP: (!) 187/85   Pulse: 85   Resp: 18   Temp: 97.1 °F (36.2 °C)   SpO2: 97%     Constitutional: This is a well developed, well nourished 76y.o. year old female who is alert, oriented, cooperative and in no apparent distress. Head was normocephalic and atraumatic. EENT: Mallampati class :  Extraocular muscles intact. External canals are patent and no discharge was appreciated. Septum was midline,   mucosa was without erythema, exudates or cobblestoning. No thrush was noted. Neck: Supple without thyromegaly. No elevated JVP. Trachea was midline. No carotid bruits were auscultated. Respiratory: rales   Cardiovascular: Regular without murmur, clicks, gallops or rubs. There is no left or right ventricular heave. Pulses:  Carotid, radial and femoral pulses were equally bilaterally. Abdomen: Slightly rounded and soft without organomegaly. No rebound, rigidity or guarding was appreciated. Lymphatic: No lymphadenopathy. Musculoskeletal: no joint deformity     Extremities:no edema   Skin:  Warm and dry. Good color, turgor and pigmentation. No lesions or scars. Neurological/Psychiatric: The patient's general behavior, level of consciousness, thought content and emotional status is normal.  Cranial nerves II-XII are intact.       DATA:                       IMPRESSION:    1-Asbestosis   2-multpie nodules    3-calcified plaques   4-Positive PPD             PLAN:      Offer biopsy as there is growth and PET may noght change my suspicious nick this GGO   She was offered navigation vs CT guided biopsy   -T spot test is + ,will send to Infectious disease for possible latent TB ,she grow some bacteria follow with ID   _Eosinophilic ,.Sed rate ,IgE and see her in 4 months  All came normal     Daughter to call and let us     Thank you for allowing me to participate in [facility] care. I will keep following with you ,should you have any concerns ,please contact me at [phone]     Sincerely,        Romana Hong MD  Pulmonary & Critical Care Medicine     NOTE: This report was transcribed using voice recognition software. Every effort was made to ensure accuracy; however, inadvertent computerized transcription errors may be present.

## 2020-09-08 NOTE — PROGRESS NOTES
Visit today in office for follow up from last Sept. Dtr in law here with pt. reviewed recent Chest Ct and results discussed with pt and dtr in law. Discussed  Chest CT guided biopsy and if pt wants to proceed with further testing. Pt dtr in law to call RN in office. 3 mos follow up appt given.

## 2020-09-10 ENCOUNTER — TELEPHONE (OUTPATIENT)
Dept: PULMONOLOGY | Age: 65
End: 2020-09-10

## 2020-09-10 NOTE — TELEPHONE ENCOUNTER
Call to to home and spoke with Darin(son in law)advsied of pt scheduled for Bronch Biopsy next week on 9/15/20 @ 1pm. Advised to have pt arrive at main entrance to hospital at 11am for pre-op. Advised that PAT staff will be calling with further directions/instrcutions on pre-op and arrival time as well. Ivone Ewing states her dtr will be with her.

## 2020-09-11 NOTE — PROGRESS NOTES
[facility] PRE-ADMISSION TESTING GENERAL INSTRUCTIONS- [facility]-phone number:[phone]    GENERAL INSTRUCTIONS  [x] Antibacterial Soap shower Night before and/or AM of Surgery  [] Balta wipe instruction sheet and wipes given. [x] Nothing by mouth after midnight, including gum, candy, mints, or water. [x] You may brush your teeth, gargle, but do NOT swallow water. []Hibiclens shower  the night before and the morning of surgery. Do not use             Hibiclens on your face or head. []No smoking, chewing tobacco, illegal drugs, or alcohol within 24 hours of your surgery. [x] Jewelry, valuables or body piercing's should not be brought to the hospital. All body and/or tongue piercing's must be removed prior to arriving to hospital.  ALL hair pins must be removed. [x] Do not wear makeup, lotions, powders, deodorant. Nail polish as directed by the nurse. [x] Arrange transportation with a responsible adult  to and from the hospital. If you do not have a responsible adult  to transport you, you will need to make arrangements with a medical transportation company (i.e. [employer]. A Uber/taxi/bus is not appropriate unless you are accompanied by a responsible adult ). Arrange for someone to be with you for the remainder of the day and for 24 hours after your procedure due to having had anesthesia. Who will be your  for transportation?__________________   Who will be staying with you for 24 hrs after your procedure?__________________  [x] Bring insurance card and photo ID.  [] Transfusion Bracelet: Please bring with you to hospital, day of surgery  [] Bring urine specimen day of surgery. Any small container is acceptable. [] Use inhalers the morning of surgery and bring with you to hospital.  [] Bring copy of living will or healthcare power of  papers to be placed in your electronic record.   [] CPAP/BI-PAP: Please bring your machine if you are to spend the night in the hospital.     PARKING INSTRUCTIONS:   [x] Arrival Time:_____09/15/2020 [address] entrance   Wear mask_______  · [] Parking lot '\"I\"  is located on [facility] (the corner of [facility] and [facility]). To enter, press the button and the gate will lift. A free token will be provided to exit the lot. One car per patient is allowed to park in this lot. All other cars are to park on [address] [place] [address] either in the parking garage or the handicap lot. [x] To reach the [facility] lobby from [address] [place] [address], upon entering the hospital, take elevator B to the 3rd floor. EDUCATION INSTRUCTIONS:      [] Knee or hip replacement booklet & exercise pamphlets given. [] [address] placed in chart. [] Pre-admission Testing educational folder given  [] Incentive Spirometry,coughing & deep breathing exercises reviewed. []Medication information sheet(s)   []Fluoroscopy-Xray used in surgery reviewed with patient. Educational pamphlet placed in chart. []Pain: Post-op pain is normal and to be expected. You will be asked to rate your pain from 0-10(a zero is not acceptable-education is needed). Your post-op pain goal is:  [] Ask your nurse for your pain medication. [] Joint camp offered. [] Joint replacement booklets given. [] Other:___________________________    MEDICATION INSTRUCTIONS:   [x]Bring a complete list of your medications, please write the last time you took the medicine, give this list to the nurse. [x] Take the following medications the morning of surgery with 1-2 ounces of water: refer to med sheet                [x] Stop herbal supplements and vitamins 5 days before your surgery. [] DO NOT take any diabetic medicine the morning of surgery. Follow instructions for insulin the day before surgery. [] If you are diabetic and your blood sugar is low or you feel symptomatic, you may drink 1-2 ounces of apple juice or take a glucose tablet. The morning of your procedure, you may call the pre-op area if you have concerns about your blood sugar [phone]. [] Use your inhalers the morning of surgery. Bring your emergency inhaler with you day of surgery. [] Follow physician instructions regarding any blood thinners you may be taking. WHAT TO EXPECT:  [] The day of surgery you will be greeted and checked in by the [facility] [employer] [facility].  In addition, you will be registered in the [facility] by a Patient Access Representative. Please bring your photo ID and insurance card. A nurse will greet you in accordance to the time you are needed in the pre-op area to prepare you for surgery. Please do not be discouraged if you are not greeted in the order you arrive as there are many variables that are involved in patient preparation. Your patience is greatly appreciated as you wait for your nurse. Please bring in items such as: books, magazines, newspapers, electronics, or any other items  to occupy your time in the waiting area. [x]  Delays may occur with surgery and staff will make a sincere effort to keep you informed of delays. If any delays occur with your procedure, we apologize ahead of time for your inconvenience as we recognize the value of your time.

## 2020-09-14 ENCOUNTER — HOSPITAL ENCOUNTER (OUTPATIENT)
Dept: NON INVASIVE DIAGNOSTICS | Age: 65
Discharge: HOME | End: 2020-09-14
Payer: OTHER GOVERNMENT

## 2020-09-14 ENCOUNTER — HOSPITAL ENCOUNTER (OUTPATIENT)
Age: 65
Discharge: HOME | End: 2020-09-16
Payer: OTHER GOVERNMENT

## 2020-09-14 LAB
EKG ATRIAL RATE: 77 BPM
EKG P AXIS: 50 DEGREES
EKG P-R INTERVAL: 160 MS
EKG Q-T INTERVAL: 388 MS
EKG QRS DURATION: 100 MS
EKG QTC CALCULATION (BAZETT): 439 MS
EKG R AXIS: -16 DEGREES
EKG T AXIS: 18 DEGREES
EKG VENTRICULAR RATE: 77 BPM

## 2020-09-14 PROCEDURE — 93005 ELECTROCARDIOGRAM TRACING: CPT | Performed by: ANESTHESIOLOGY

## 2020-09-14 PROCEDURE — U0003 INFECTIOUS AGENT DETECTION BY NUCLEIC ACID (DNA OR RNA); SEVERE ACUTE RESPIRATORY SYNDROME CORONAVIRUS 2 (SARS-COV-2) (CORONAVIRUS DISEASE [COVID-19]), AMPLIFIED PROBE TECHNIQUE, MAKING USE OF HIGH THROUGHPUT TECHNOLOGIES AS DESCRIBED BY CMS-2020-01-R: HCPCS

## 2020-09-15 ENCOUNTER — APPOINTMENT (OUTPATIENT)
Dept: GENERAL RADIOLOGY | Age: 65
End: 2020-09-15
Attending: INTERNAL MEDICINE

## 2020-09-15 ENCOUNTER — ANESTHESIA EVENT (OUTPATIENT)
Dept: ENDOSCOPY | Age: 65
End: 2020-09-15

## 2020-09-15 ENCOUNTER — ANESTHESIA (OUTPATIENT)
Dept: ENDOSCOPY | Age: 65
End: 2020-09-15

## 2020-09-15 ENCOUNTER — HOSPITAL ENCOUNTER (OUTPATIENT)
Age: 65
Setting detail: OUTPATIENT SURGERY
Discharge: HOME | End: 2020-09-15
Attending: INTERNAL MEDICINE | Admitting: INTERNAL MEDICINE
Payer: MEDICAID

## 2020-09-15 VITALS — OXYGEN SATURATION: 100 % | TEMPERATURE: 96.8 F | DIASTOLIC BLOOD PRESSURE: 134 MMHG | SYSTOLIC BLOOD PRESSURE: 157 MMHG

## 2020-09-15 VITALS
HEIGHT: 64 IN | DIASTOLIC BLOOD PRESSURE: 70 MMHG | TEMPERATURE: 97.9 F | RESPIRATION RATE: 16 BRPM | OXYGEN SATURATION: 97 % | WEIGHT: 130 LBS | SYSTOLIC BLOOD PRESSURE: 128 MMHG | BODY MASS INDEX: 22.2 KG/M2 | HEART RATE: 95 BPM

## 2020-09-15 PROCEDURE — 6360000002 HC RX W HCPCS: Performed by: NURSE ANESTHETIST, CERTIFIED REGISTERED

## 2020-09-15 PROCEDURE — C1725 CATH, TRANSLUMIN NON-LASER: HCPCS | Performed by: INTERNAL MEDICINE

## 2020-09-15 PROCEDURE — 2720000010 HC SURG SUPPLY STERILE: Performed by: INTERNAL MEDICINE

## 2020-09-15 PROCEDURE — 87070 CULTURE OTHR SPECIMN AEROBIC: CPT

## 2020-09-15 PROCEDURE — 3609011900 HC BRONCHOSCOPY NEEDLE BX TRACHEA MAIN STEM&/BRON: Performed by: INTERNAL MEDICINE

## 2020-09-15 PROCEDURE — 7100000000 HC PACU RECOVERY - FIRST 15 MIN: Performed by: INTERNAL MEDICINE

## 2020-09-15 PROCEDURE — 3609155400 HC ADD ON COMPUTER ASSISTED NAVIGATION: Performed by: INTERNAL MEDICINE

## 2020-09-15 PROCEDURE — 88112 CYTOPATH CELL ENHANCE TECH: CPT

## 2020-09-15 PROCEDURE — 7100000011 HC PHASE II RECOVERY - ADDTL 15 MIN: Performed by: INTERNAL MEDICINE

## 2020-09-15 PROCEDURE — 3700000001 HC ADD 15 MINUTES (ANESTHESIA): Performed by: INTERNAL MEDICINE

## 2020-09-15 PROCEDURE — 88173 CYTOPATH EVAL FNA REPORT: CPT

## 2020-09-15 PROCEDURE — 71045 X-RAY EXAM CHEST 1 VIEW: CPT

## 2020-09-15 PROCEDURE — 87205 SMEAR GRAM STAIN: CPT

## 2020-09-15 PROCEDURE — 7100000010 HC PHASE II RECOVERY - FIRST 15 MIN: Performed by: INTERNAL MEDICINE

## 2020-09-15 PROCEDURE — 2709999900 HC NON-CHARGEABLE SUPPLY: Performed by: INTERNAL MEDICINE

## 2020-09-15 PROCEDURE — 2500000003 HC RX 250 WO HCPCS: Performed by: NURSE ANESTHETIST, CERTIFIED REGISTERED

## 2020-09-15 PROCEDURE — 31627 NAVIGATIONAL BRONCHOSCOPY: CPT | Performed by: INTERNAL MEDICINE

## 2020-09-15 PROCEDURE — 31652 BRONCH EBUS SAMPLNG 1/2 NODE: CPT | Performed by: INTERNAL MEDICINE

## 2020-09-15 PROCEDURE — 87015 SPECIMEN INFECT AGNT CONCNTJ: CPT

## 2020-09-15 PROCEDURE — 31624 DX BRONCHOSCOPE/LAVAGE: CPT | Performed by: INTERNAL MEDICINE

## 2020-09-15 PROCEDURE — 3609010900 HC BRONCHOSCOPY THERAPUTIC ASPIRATION INITIAL: Performed by: INTERNAL MEDICINE

## 2020-09-15 PROCEDURE — 87206 SMEAR FLUORESCENT/ACID STAI: CPT

## 2020-09-15 PROCEDURE — 31629 BRONCHOSCOPY/NEEDLE BX EACH: CPT | Performed by: INTERNAL MEDICINE

## 2020-09-15 PROCEDURE — 87116 MYCOBACTERIA CULTURE: CPT

## 2020-09-15 PROCEDURE — 3209999900 FLUORO FOR SURGICAL PROCEDURES

## 2020-09-15 PROCEDURE — 2580000003 HC RX 258: Performed by: NURSE ANESTHETIST, CERTIFIED REGISTERED

## 2020-09-15 PROCEDURE — 3609020000 HC BRONCHOSCOPY W/EBUS FNA: Performed by: INTERNAL MEDICINE

## 2020-09-15 PROCEDURE — 7100000001 HC PACU RECOVERY - ADDTL 15 MIN: Performed by: INTERNAL MEDICINE

## 2020-09-15 PROCEDURE — 87102 FUNGUS ISOLATION CULTURE: CPT

## 2020-09-15 PROCEDURE — 3609010800 HC BRONCHOSCOPY ALVEOLAR LAVAGE: Performed by: INTERNAL MEDICINE

## 2020-09-15 PROCEDURE — 3700000000 HC ANESTHESIA ATTENDED CARE: Performed by: INTERNAL MEDICINE

## 2020-09-15 PROCEDURE — 88305 TISSUE EXAM BY PATHOLOGIST: CPT

## 2020-09-15 PROCEDURE — 89051 BODY FLUID CELL COUNT: CPT

## 2020-09-15 RX ORDER — DEXAMETHASONE SODIUM PHOSPHATE 10 MG/ML
INJECTION, SOLUTION INTRAMUSCULAR; INTRAVENOUS PRN
Status: DISCONTINUED | OUTPATIENT
Start: 2020-09-15 | End: 2020-09-15 | Stop reason: SDUPTHER

## 2020-09-15 RX ORDER — NEOSTIGMINE METHYLSULFATE 1 MG/ML
INJECTION, SOLUTION INTRAVENOUS PRN
Status: DISCONTINUED | OUTPATIENT
Start: 2020-09-15 | End: 2020-09-15 | Stop reason: SDUPTHER

## 2020-09-15 RX ORDER — LIDOCAINE HYDROCHLORIDE 20 MG/ML
INJECTION, SOLUTION INTRAVENOUS PRN
Status: DISCONTINUED | OUTPATIENT
Start: 2020-09-15 | End: 2020-09-15 | Stop reason: SDUPTHER

## 2020-09-15 RX ORDER — GLYCOPYRROLATE 1 MG/5 ML
SYRINGE (ML) INTRAVENOUS PRN
Status: DISCONTINUED | OUTPATIENT
Start: 2020-09-15 | End: 2020-09-15 | Stop reason: SDUPTHER

## 2020-09-15 RX ORDER — SODIUM CHLORIDE 9 MG/ML
INJECTION, SOLUTION INTRAVENOUS CONTINUOUS PRN
Status: DISCONTINUED | OUTPATIENT
Start: 2020-09-15 | End: 2020-09-15 | Stop reason: SDUPTHER

## 2020-09-15 RX ORDER — PROPOFOL 10 MG/ML
INJECTION, EMULSION INTRAVENOUS PRN
Status: DISCONTINUED | OUTPATIENT
Start: 2020-09-15 | End: 2020-09-15 | Stop reason: SDUPTHER

## 2020-09-15 RX ORDER — ONDANSETRON 2 MG/ML
INJECTION INTRAMUSCULAR; INTRAVENOUS PRN
Status: DISCONTINUED | OUTPATIENT
Start: 2020-09-15 | End: 2020-09-15 | Stop reason: SDUPTHER

## 2020-09-15 RX ORDER — ROCURONIUM BROMIDE 10 MG/ML
INJECTION, SOLUTION INTRAVENOUS PRN
Status: DISCONTINUED | OUTPATIENT
Start: 2020-09-15 | End: 2020-09-15 | Stop reason: SDUPTHER

## 2020-09-15 RX ORDER — FENTANYL CITRATE 50 UG/ML
INJECTION, SOLUTION INTRAMUSCULAR; INTRAVENOUS PRN
Status: DISCONTINUED | OUTPATIENT
Start: 2020-09-15 | End: 2020-09-15 | Stop reason: SDUPTHER

## 2020-09-15 RX ADMIN — Medication 3 MG: at 14:31

## 2020-09-15 RX ADMIN — ONDANSETRON HYDROCHLORIDE 4 MG: 2 INJECTION, SOLUTION INTRAMUSCULAR; INTRAVENOUS at 14:16

## 2020-09-15 RX ADMIN — PROPOFOL 150 MG: 10 INJECTION, EMULSION INTRAVENOUS at 13:09

## 2020-09-15 RX ADMIN — ROCURONIUM BROMIDE 30 MG: 10 INJECTION, SOLUTION INTRAVENOUS at 13:09

## 2020-09-15 RX ADMIN — Medication 0.6 MG: at 14:31

## 2020-09-15 RX ADMIN — SODIUM CHLORIDE: 9 INJECTION, SOLUTION INTRAVENOUS at 13:04

## 2020-09-15 RX ADMIN — LIDOCAINE HYDROCHLORIDE 100 MG: 20 INJECTION, SOLUTION INTRAVENOUS at 13:09

## 2020-09-15 RX ADMIN — FENTANYL CITRATE 100 MCG: 50 INJECTION, SOLUTION INTRAMUSCULAR; INTRAVENOUS at 13:09

## 2020-09-15 RX ADMIN — DEXAMETHASONE SODIUM PHOSPHATE 10 MG: 10 INJECTION INTRAMUSCULAR; INTRAVENOUS at 13:15

## 2020-09-15 ASSESSMENT — PULMONARY FUNCTION TESTS
PIF_VALUE: 27
PIF_VALUE: 31
PIF_VALUE: 13
PIF_VALUE: 32
PIF_VALUE: 1
PIF_VALUE: 33
PIF_VALUE: 29
PIF_VALUE: 41
PIF_VALUE: -2
PIF_VALUE: 25
PIF_VALUE: 41
PIF_VALUE: 0
PIF_VALUE: 29
PIF_VALUE: 41
PIF_VALUE: 18
PIF_VALUE: 30
PIF_VALUE: 8
PIF_VALUE: 1
PIF_VALUE: 41
PIF_VALUE: 27
PIF_VALUE: 41
PIF_VALUE: 27
PIF_VALUE: 41
PIF_VALUE: 0
PIF_VALUE: 41
PIF_VALUE: 27
PIF_VALUE: 40
PIF_VALUE: 27
PIF_VALUE: 1
PIF_VALUE: 15
PIF_VALUE: 1
PIF_VALUE: 0
PIF_VALUE: 0
PIF_VALUE: 2
PIF_VALUE: 41
PIF_VALUE: 40
PIF_VALUE: 41
PIF_VALUE: 29
PIF_VALUE: 41
PIF_VALUE: 11
PIF_VALUE: 14
PIF_VALUE: 40
PIF_VALUE: 12
PIF_VALUE: 9
PIF_VALUE: 41
PIF_VALUE: 0
PIF_VALUE: 19
PIF_VALUE: 41
PIF_VALUE: 12
PIF_VALUE: 41
PIF_VALUE: 21
PIF_VALUE: 21
PIF_VALUE: 41
PIF_VALUE: 27
PIF_VALUE: 41
PIF_VALUE: 1
PIF_VALUE: 30
PIF_VALUE: 41
PIF_VALUE: 30
PIF_VALUE: 41
PIF_VALUE: 40
PIF_VALUE: 0
PIF_VALUE: 41
PIF_VALUE: 14
PIF_VALUE: 27
PIF_VALUE: 0
PIF_VALUE: 41

## 2020-09-15 ASSESSMENT — PAIN SCALES - GENERAL
PAINLEVEL_OUTOF10: 0

## 2020-09-15 ASSESSMENT — PAIN - FUNCTIONAL ASSESSMENT
PAIN_FUNCTIONAL_ASSESSMENT: 0-10
PAIN_FUNCTIONAL_ASSESSMENT: 0-10

## 2020-09-15 NOTE — PROGRESS NOTES
Mandarin interpretation line called on ipad, reviewed all d/c intructions with patient and patient had no questions for me.

## 2020-09-15 NOTE — PROGRESS NOTES
Department of Internal Medicine  Division of Pulmonary, Critical Care & Sleep Medicine  Pulmonary [address] [facility]                                             Pulmonary Clinic Consult     I had the pleasure of seeing  Valerio Lazaro in the [facility] regarding their history of asbestosis     No chief complaint on file. HISTORY OF PRESENT ILLNESS:    Valerio Lazaro is a 76y.o. year old  Who never smoke or seconds smoke around her     The Patient comes in with SOB that has been going on the last few years and she had work up 2 years ago for TB and asbestsos and she has chronic     She  has cough ,productive for clear  Sputum and it is more in the     She had  has + PPD,abnormal CT/CXR with cough. I. The patients' cough has not been productive of any sputum or blood. She has had no fever noted. She has had no associated  sore throat, HA, ear discomfort. . She does not smoke tobacco. She has been in the [place] for quite some time, she has had no recent travel. Her family member is concerned as her cough has been persistent and brings her to [facility] for order for an outpatient CXR. The CXR was reported as abnormal and thus a CT scan was performed. The CT scan was reviewed and show pleural plaque without adenopathy. Using a  we note that she has not TB exposure or history. She from [place]. On questioning she was a  for 30 + years with mainly soft metals. Her  however is a  in [place] for 40 + years and has significant exposure to asbestos exposure. 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 She has had the cough for 10 years.  No GERD    She has CT scan shows multiple plaques and also bronchoscopy done with bacterial growth ,negative AFB     Today visit  She came for follow up Ct as she ahd CT in 8 /2020 and shows increase left lung GGO    is daughter and she agreed that her daughter translate and help with decision  and she speak little english ,she agreed for daughter  to interpret   Ct shows some growth in left lung     ALLERGIES:  No Known Allergies    PAST MEDICAL HISTORY:       Diagnosis Date    Hypertension     TB (pulmonary tuberculosis)        MEDICATIONS:   No current facility-administered medications for this encounter. SOCIAL AND OCCUPATIONAL HEALTH:  Social History     Tobacco Use   Smoking Status Never Smoker   Smokeless Tobacco Never Used     TB :  Pets   Industrial exposure:  Birds :    SURGICAL HISTORY:   Past Surgical History:   Procedure Laterality Date    BRONCHOSCOPY  12/14/2016    HYSTERECTOMY      LEEP  7/23/2014    EXAM       FAMILY HISTORY:   Lung cancer:NO   DVT or PE No     REVIEW OF SYSTEMS:  Constitutional: General health is good . There has been no weight changes. No fevers, fatigue or weakness. Head: Patient denies any history of trauma, convulsive disorder or syncope. Skin:  Patient denies history of changes in pigmentation, eruptions or pruritus. No easy bruising or bleeding. EENT: no nasal congestion   Cardiovascular ,No chest pain ,No edema ,  Respiration:SOB:+  ,KHAN :+  Gastrointestinal:No GI bleed ,no melena  ,no hematemesis    Musculoskeletal: no joint pain ,no swelling  Neurological:no , syncope. Denies twitching, convulsions, loss of consciousness or memory. Endocrine:  . No history of goiter, exophthalmos or dryness of skin. The patient has no history of diabetes. Hematopoietic:  No history of bleeding disorders or easy bruising. Rheumatic:  No connective tissue disease history or polyarthritis/inflammatory joint disease. PHYSICAL EXAMINATION:  Vitals:    09/15/20 1117   BP: (!) 192/80   Pulse: 78   Resp: 16   Temp: 97.7 °F (36.5 °C)   SpO2: 98%     Constitutional: This is a well developed, well nourished 76y.o. year old female who is alert, oriented, cooperative and in no apparent distress. Head was normocephalic and atraumatic. EENT: Mallampati class :  Extraocular muscles intact. External canals are patent and no discharge was appreciated. Septum was midline,   mucosa was without erythema, exudates or cobblestoning. No thrush was noted. Neck: Supple without thyromegaly. No elevated JVP. Trachea was midline. No carotid bruits were auscultated. Respiratory: rales   Cardiovascular: Regular without murmur, clicks, gallops or rubs. There is no left or right ventricular heave. Pulses:  Carotid, radial and femoral pulses were equally bilaterally. Abdomen: Slightly rounded and soft without organomegaly. No rebound, rigidity or guarding was appreciated. Lymphatic: No lymphadenopathy. Musculoskeletal: no joint deformity     Extremities:no edema   Skin:  Warm and dry. Good color, turgor and pigmentation. No lesions or scars. Neurological/Psychiatric: The patient's general behavior, level of consciousness, thought content and emotional status is normal.  Cranial nerves II-XII are intact. DATA:                       IMPRESSION:    1-Asbestosis   2-multpie nodules    3-calcified plaques   4-Positive PPD             PLAN:    No change in H&P  She wanted bronch and does not want CT guided biopsy       Offer biopsy as there is growth and PET may noght change my suspicious nick this GGO   She was offered navigation vs CT guided biopsy ,she wanted navigation   All risks, benefits, alternatives and potential complications explained thoroughly including, but not limited to, bleeding, infection, lung injury, pneumothorax . , heart attack, prolonged ventilation,  and even death, and the patient agrees to proceed.       -T spot test is + ,will send to Infectious disease for possible latent TB ,she grow some bacteria follow with ID   _Eosinophilic ,.Sed rate ,IgE and see her in 4 months  All came normal     Daughter to call and let us     Thank you for allowing me to participate in [facility] care. I will keep following with you ,should you have any concerns ,please contact me at [phone]     Sincerely,        Florentino Trujillo MD  Pulmonary & Critical Care Medicine     NOTE: This report was transcribed using voice recognition software. Every effort was made to ensure accuracy; however, inadvertent computerized transcription errors may be present.

## 2020-09-15 NOTE — PROGRESS NOTES
Called mandarin interpretation via ipad and reviewed all d/c instructions with patient, patient had no questions for me.  Wilfred Khan

## 2020-09-15 NOTE — PROGRESS NOTES
Admitted to [facility]. Daughter with pt interpreting until language IPad works. Pt had cough drop in her mouth . RN had herthrow it away. Dr Petar Sampson notified of that and that Covid test done yesterday is not reported. Also perfect served Dr Shavon Knox for orders @ 4162.   FELICIANO Haddad RN

## 2020-09-15 NOTE — ANESTHESIA PRE PROCEDURE
BP Readings from Last 3 Encounters:   09/15/20 (!) 192/80   09/08/20 (!) 187/85   08/10/20 (!) 180/82       NPO Status: Time of last liquid consumption: 0700                        Time of last solid consumption: 1700                        Date of last liquid consumption: 09/15/20                        Date of last solid food consumption: 09/14/20    BMI:   Wt Readings from Last 3 Encounters:   09/15/20 130 lb (59 kg)   09/08/20 124 lb (56.2 kg)   08/10/20 124 lb 3.2 oz (56.3 kg)     Body mass index is 22.31 kg/m². CBC:   Lab Results   Component Value Date    WBC 8.1 03/26/2019    RBC 4.54 03/26/2019    HGB 13.5 03/26/2019    HCT 41.9 03/26/2019    MCV 92.3 03/26/2019    RDW 13.1 03/26/2019     03/26/2019       CMP:   Lab Results   Component Value Date     09/27/2019    K 4.8 09/27/2019     09/27/2019    CO2 24 09/27/2019    BUN 14 09/27/2019    CREATININE 0.6 09/27/2019    GFRAA >60 09/27/2019    LABGLOM >60 09/27/2019    GLUCOSE 104 09/27/2019    PROT 8.6 09/27/2019    CALCIUM 10.0 09/27/2019    BILITOT 0.5 09/27/2019    ALKPHOS 89 09/27/2019    AST 16 09/27/2019    ALT 11 09/27/2019       POC Tests: No results for input(s): POCGLU, POCNA, POCK, POCCL, POCBUN, POCHEMO, POCHCT in the last 72 hours.     Coags:   Lab Results   Component Value Date    PROTIME 12.2 12/14/2016    INR 1.1 12/14/2016    APTT 35.0 12/14/2016       HCG (If Applicable):   Lab Results   Component Value Date    PREGTESTUR negative 07/23/2014        ABGs: No results found for: PHART, PO2ART, LIO8ADA, FDB1FKC, BEART, X1QEARKD     Type & Screen (If Applicable):  No results found for: LABABO, LABRH    Drug/Infectious Status (If Applicable):  No results found for: HIV, HEPCAB    COVID-19 Screening (If Applicable): No results found for: COVID19      Anesthesia Evaluation  Patient summary reviewed and Nursing notes reviewed no history of anesthetic complications:   Airway: Mallampati: II        Dental:      Comment: ALL UPPER FRONT TEETH CAPS    Pulmonary:   (+) decreased breath sounds,                            ROS comment: MASS  HISTORY TB   Cardiovascular:    (+) hypertension:,         Rhythm: regular  Rate: normal           Beta Blocker:  Not on Beta Blocker         Neuro/Psych:               GI/Hepatic/Renal: Neg GI/Hepatic/Renal ROS            Endo/Other:              Pt had no PAT visit       Abdominal:           Vascular: negative vascular ROS. Anesthesia Plan      general     ASA 2       Induction: intravenous. MIPS: Postoperative opioids intended, Prophylactic antiemetics administered and Postoperative trial extubation. Anesthetic plan and risks discussed with patient. Plan discussed with CRNA.                   Parth Root MD   9/15/2020

## 2020-09-15 NOTE — OP NOTE
Operative Note      Patient: Albina Salgado  YOB: 1955  MRN: [id number]    Date of Procedure: 9/15/2020    Pre-Op Diagnosis: LUNG NODULE    [address]  BROCHOSCOPY /EBUS/Navigation PROCEDURE NOTE    DATE OF PROCEDURE:9 /15   / 2020    INDICATIONS & HISTORY:  Lung mass /TB    PREOPERATIVE DIAGNOSIS:    Lung mass    POSTOPERATIVE DIAGNOSES:  Lung mass      PROCEDURE PERFORMED:   1-Diagnotic, Fiberoptic Bronchoscopy  2-Bronchial alveolar lavage from *TUNDE  3-EBUS -FNA station 7 was 8X6 mm   4-Navigation Bronchoscopy   5- Needle Aspirate from TUNDE mass X 4              SURGEON:    Lupe Brennan     ASSISTANT:   Bronchoscopy Nursing/Technical Team/OR Team    SEDATION:  propofol   Regional Anesthesia,       ANESTHESIA:    Lidocaine 2% ,       ANESTHESIOLOGIST:  Per EPIC Note    SPECIMENS:  [x] Bronchial sample(s) for      Fungal smear & culture,   Acid-fast bacillus Smear and Culture,    Gram stain, C&S,    PCP               Cytology                   Description of Procedure: The patient was taken to the endoscopy suite, identifiedYonglan Mu  and the procedure verified as Flexible Fiberoptic Bronchoscopy with BAL and Navigation Bronchoscopy         After the patient was controlled with sedation bronchoscope was introduced through ET* without difficulty. The scope was then passed into the trachea. Additional 2% lidocaine was used topically within the airway. Careful inspection of the tracheal lumen was accomplished. The scope was sequentially passed into all bronchial airways.            Endobronchial findings:   Vocal cord not seenm  Trachea:  Normal mucosa, he the part seen outside the ET tube  Mariam  Normal mucosa     Right Main Stem Bronchus  Normal mucosa  Right Upper Lobe Bronchi Normal mucosa  Right Middle Lobe Bronchi  Normal mucosa  Right Lower Lobe Bronchi (including the Superior segment)  Normal mucosa     Left Main Stem Bronchus Normal mucosa  Left Upper Lobe Bronchus, Upper Division Normal mucosa  Left Upper Lobe Bronchus, Lingula  Normal mucosa  Left Lower Lobe Bronchus (including the Superior segment)          Then EBUS scope was done and it shows many small Lymph nodes ,station 4 was 6 mm and starion 7 was 8 X 8 mm so we biopsy station 7     EBUS station 7 was 8 X 6 mm and  X 3 passes Taken ,    Then Navigation scope was done and we directed to TUNDE as per navigation and multiple passes taken     Navigation left upper mass X 4 needle aspirate        BAL : TUNDE    COMPLICATIONS:  Estimated blood loss less than 5 CC    IMPRESSIONS:   Lung mass      RECOMMENDATIONS:   The Patient was taken to the recovery area in satisfactory condition. Await final test/sample results.         Horacio Howard MD

## 2020-09-15 NOTE — PROGRESS NOTES
1645-[facility] care completed. [phone]- Patient discharged per wheelchair to car, driven by daughter, in stable condition.

## 2020-09-16 LAB
APPEARANCE FLUID: NORMAL
BASO FLUID: 0 %
CELL COUNT FLUID TYPE: NORMAL
COLOR FLUID: NORMAL
EOSINOPHIL FLUID: 2 %
GRAM STAIN ORDERABLE: NORMAL
LYMPHOCYTES, BODY FLUID: 37 %
MONOCYTE, FLUID: 37 %
NEUTROPHIL, FLUID: 24 %
NUCLEATED CELLS FLUID: 100 /UL
RBC FLUID: NORMAL /UL
SARS-COV-2: NOT DETECTED
SOURCE: NORMAL

## 2020-09-16 NOTE — PROGRESS NOTES
[facility]  InternalMedicine Residency Program  ACC Note      SUBJECTIVE:  CC: had concerns including Hypertension (morining blood pressue is high and low in the afternoon); Neck Pain (no injury or trauma); and Dizziness (right ear not able to hear). HPI:Nahum Jordan presented to the [facility] for a routine visit. 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 No TB exposure per pt. Bronchoscopy was done with bacterial growth but negative AFB in 2016     Follow up Ct  in 8/2020 showed increase left lung GGO   Underwent Fiberoptic Bronchoscopy with EBUS and FNA and needle aspirate of TUNDE mass by Dr Alena Taylor on 9/15 - labs pending     HTN   Started on low dose amlodipine last visit 5 weeks ago (higher doses dropped her BP)    BP today 124/68  BP when she wakes up 140-150  Afternoon 105-110  No Syncope, falls,  Orthostatics negative     Complains of occasional dizziness that occur all day  Had otitis media infection in R ear about 3 months ago -   States that her hearing is about 70-80% of what it used to be and not fully at baseline   Before she used to be unsteady when walking, but now shes able to walk in straight line without issues   Gets dizzy when moving her head quickly     Also c/o neck pain that started about the same time she had the infection and still persistent   Tender to palpation, no pain with movement,   Advised to use OTC muscle relaxant cream    Review of Systems   Constitutional: Negative for fatigue and fever. Respiratory: Positive for cough. Negative for shortness of breath and wheezing. Cardiovascular: Negative for chest pain and leg swelling. Gastrointestinal: Negative for abdominal pain, constipation, diarrhea, nausea and vomiting. Endocrine: Negative for polyuria. Genitourinary: Negative for difficulty urinating. Musculoskeletal: Negative for arthralgias. Neurological: Positive for dizziness. Negative for syncope, weakness, light-headedness, numbness and headaches. Current Outpatient Medications on File Prior to Visit   Medication Sig Dispense Refill    amLODIPine (NORVASC) 2.5 MG tablet Take 1 tablet by mouth daily (Patient taking differently: Take 5 mg by mouth daily Taking 5 mg daily) 30 tablet 5    fluticasone (FLONASE) 50 MCG/ACT nasal spray 1 spray by Each Nostril route 2 times daily 1 Bottle 3     No current facility-administered medications on file prior to visit. OBJECTIVE:    VS: /68 (Site: Left Upper Arm, Position: Standing, Cuff Size: Medium Adult)   Pulse 71   Temp 97.6 °F (36.4 °C) (Oral)   Resp 20   Ht 5' 3\" (1.6 m)   Wt 125 lb 1.6 oz (56.7 kg)   SpO2 98%   BMI 22.16 kg/m²   Physical Exam  Constitutional:       Appearance: Normal appearance. HENT:      Left Ear: There is impacted cerumen. Eyes:      Extraocular Movements: Extraocular movements intact. Cardiovascular:      Rate and Rhythm: Normal rate and regular rhythm. Pulses: Normal pulses. Heart sounds: Normal heart sounds. No murmur. Pulmonary:      Effort: Pulmonary effort is normal. No respiratory distress. Breath sounds: Normal breath sounds. No wheezing. Abdominal:      General: Bowel sounds are normal. There is no distension. Palpations: Abdomen is soft. Tenderness: There is no abdominal tenderness. Skin:     General: Skin is warm and dry. Neurological:      Mental Status: She is alert.       Coordination: Coordination normal.      Gait: Gait normal.      Comments: No cerebellar signs          ASSESSMENT/PLAN:    I have reviewed all pertient PMHx, PSHx, FamHx, Social Hx, medications, and allergies andupdated history as appropriate. Liyah Ibanez was seen today for hypertension, neck pain and dizziness.     Diagnoses and all orders for this visit:    Chronic cough  F/u with Dr Nichole Sánchez in regards to Bronch results   F/u with ID     Neck pain  Likely MSK   Advised about using OTC muscle relaxant cream   XR cervical     Dizziness  Orthostatics negative   Advised to take Amlodipine at night instead on morning and monitor       RTC: 2 months     I have reviewed my findings andrecommendations with Kelley House and Dr Dede Avalos MD PGY-2  9/18/2020 10:05 AM

## 2020-09-16 NOTE — H&P
Department of Internal Medicine  Division of Pulmonary, Critical Care & Sleep Medicine  Pulmonary [address] [facility]                                             Pulmonary Clinic Consult     I had the pleasure of seeing  Shreya Mcgee in the [facility] regarding their history of asbestosis     No chief complaint on file. HISTORY OF PRESENT ILLNESS:    Shreya Mcgee is a 76y.o. year old  Who never smoke or seconds smoke around her     The Patient comes in with SOB that has been going on the last few years and she had work up 2 years ago for TB and asbestsos and she has chronic     She  has cough ,productive for clear  Sputum and it is more in the     She had  has + PPD,abnormal CT/CXR with cough. I. The patients' cough has not been productive of any sputum or blood. She has had no fever noted. She has had no associated  sore throat, HA, ear discomfort. . She does not smoke tobacco. She has been in the [place] for quite some time, she has had no recent travel. Her family member is concerned as her cough has been persistent and brings her to [facility] for order for an outpatient CXR. The CXR was reported as abnormal and thus a CT scan was performed. The CT scan was reviewed and show pleural plaque without adenopathy. Using a  we note that she has not TB exposure or history. She from [place]. On questioning she was a  for 30 + years with mainly soft metals. Her  however is a  in [place] for 40 + years and has significant exposure to asbestos exposure. 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 She has had the cough for 10 years.  No GERD    She has CT scan shows multiple plaques and also bronchoscopy done with bacterial growth ,negative AFB     Today visit  She came for follow up Ct as she ahd CT in 8 /2020 and shows increase left lung GGO    is daughter and she agreed that her daughter translate and help with decision  and she speak little english ,she agreed for daughter  to interpret   Ct shows some growth in left lung     ALLERGIES:  No Known Allergies    PAST MEDICAL HISTORY:       Diagnosis Date    Hypertension     TB (pulmonary tuberculosis)        MEDICATIONS:   No current facility-administered medications for this encounter. Current Outpatient Medications   Medication Sig Dispense Refill    amLODIPine (NORVASC) 2.5 MG tablet Take 1 tablet by mouth daily 30 tablet 5    fluticasone (FLONASE) 50 MCG/ACT nasal spray 1 spray by Each Nostril route 2 times daily 1 Bottle 3       SOCIAL AND OCCUPATIONAL HEALTH:  Social History     Tobacco Use   Smoking Status Never Smoker   Smokeless Tobacco Never Used     TB :  Pets   Industrial exposure:  Birds :    SURGICAL HISTORY:   Past Surgical History:   Procedure Laterality Date    BRONCHOSCOPY  12/14/2016    HYSTERECTOMY      LEEP  7/23/2014    EXAM       FAMILY HISTORY:   Lung cancer:NO   DVT or PE No     REVIEW OF SYSTEMS:  Constitutional: General health is good . There has been no weight changes. No fevers, fatigue or weakness. Head: Patient denies any history of trauma, convulsive disorder or syncope. Skin:  Patient denies history of changes in pigmentation, eruptions or pruritus. No easy bruising or bleeding. EENT: no nasal congestion   Cardiovascular ,No chest pain ,No edema ,  Respiration:SOB:+  ,KHAN :+  Gastrointestinal:No GI bleed ,no melena  ,no hematemesis    Musculoskeletal: no joint pain ,no swelling  Neurological:no , syncope. Denies twitching, convulsions, loss of consciousness or memory. Endocrine:  . No history of goiter, exophthalmos or dryness of skin. The patient has no history of diabetes. Hematopoietic:  No history of bleeding disorders or easy bruising. Rheumatic:  No connective tissue disease history or polyarthritis/inflammatory joint disease.       PHYSICAL EXAMINATION:  Vitals:    09/15/20 1650   BP: 128/70   Pulse: 95   Resp: 16   Temp:    SpO2:      Constitutional: This is a well developed, well nourished 76y.o. year old female who is alert, oriented, cooperative and in no apparent distress. Head was normocephalic and atraumatic. EENT: Mallampati class :  Extraocular muscles intact. External canals are patent and no discharge was appreciated. Septum was midline,   mucosa was without erythema, exudates or cobblestoning. No thrush was noted. Neck: Supple without thyromegaly. No elevated JVP. Trachea was midline. No carotid bruits were auscultated. Respiratory: rales   Cardiovascular: Regular without murmur, clicks, gallops or rubs. There is no left or right ventricular heave. Pulses:  Carotid, radial and femoral pulses were equally bilaterally. Abdomen: Slightly rounded and soft without organomegaly. No rebound, rigidity or guarding was appreciated. Lymphatic: No lymphadenopathy. Musculoskeletal: no joint deformity     Extremities:no edema   Skin:  Warm and dry. Good color, turgor and pigmentation. No lesions or scars. Neurological/Psychiatric: The patient's general behavior, level of consciousness, thought content and emotional status is normal.  Cranial nerves II-XII are intact. DATA:                       IMPRESSION:    1-Asbestosis   2-multpie nodules    3-calcified plaques   4-Positive PPD             PLAN:    No change in H&P  She wanted bronch and does not want CT guided biopsy       Offer biopsy as there is growth and PET may noght change my suspicious nick this GGO   She was offered navigation vs CT guided biopsy ,she wanted navigation   All risks, benefits, alternatives and potential complications explained thoroughly including, but not limited to, bleeding, infection, lung injury, pneumothorax . , heart attack, prolonged ventilation,  and even death, and the patient agrees to proceed.       -T spot test is + ,will send to Infectious disease for possible latent TB ,she grow some bacteria follow with ID   _Eosinophilic ,.Sed rate ,IgE and see her in 4 months  All came normal     Daughter to call and let us     Thank you for allowing me to participate in [facility]. I will keep following with you ,should you have any concerns ,please contact me at [phone]     Sincerely,        Cosmo Huang MD  Pulmonary & Critical Care Medicine     NOTE: This report was transcribed using voice recognition software. Every effort was made to ensure accuracy; however, inadvertent computerized transcription errors may be present.

## 2020-09-17 LAB
CULTURE, RESPIRATORY: NORMAL
SMEAR, RESPIRATORY: NORMAL

## 2020-09-18 ENCOUNTER — OFFICE VISIT (OUTPATIENT)
Dept: INTERNAL MEDICINE | Age: 65
End: 2020-09-18
Payer: MEDICAID

## 2020-09-18 VITALS
TEMPERATURE: 97.6 F | WEIGHT: 125.1 LBS | BODY MASS INDEX: 22.16 KG/M2 | RESPIRATION RATE: 20 BRPM | DIASTOLIC BLOOD PRESSURE: 68 MMHG | SYSTOLIC BLOOD PRESSURE: 125 MMHG | OXYGEN SATURATION: 98 % | HEIGHT: 63 IN | HEART RATE: 71 BPM

## 2020-09-18 PROBLEM — R42 DIZZINESS: Status: ACTIVE | Noted: 2020-09-18

## 2020-09-18 PROBLEM — H61.22 IMPACTED CERUMEN OF LEFT EAR: Status: ACTIVE | Noted: 2020-09-18

## 2020-09-18 PROBLEM — M54.2 NECK PAIN: Status: ACTIVE | Noted: 2020-09-18

## 2020-09-18 PROCEDURE — 69210 REMOVE IMPACTED EAR WAX UNI: CPT | Performed by: INTERNAL MEDICINE

## 2020-09-18 PROCEDURE — 99213 OFFICE O/P EST LOW 20 MIN: CPT | Performed by: INTERNAL MEDICINE

## 2020-09-18 PROCEDURE — 99212 OFFICE O/P EST SF 10 MIN: CPT | Performed by: INTERNAL MEDICINE

## 2020-09-18 ASSESSMENT — ENCOUNTER SYMPTOMS
SHORTNESS OF BREATH: 0
NAUSEA: 0
ABDOMINAL PAIN: 0
DIARRHEA: 0
VOMITING: 0
WHEEZING: 0
CONSTIPATION: 0
COUGH: 1

## 2020-09-18 NOTE — PROGRESS NOTES
Patient given instructions by Dr Maia Gonzales. 2 month follow up scheduled. Order for xray and printed AVS given to patient.

## 2020-09-18 NOTE — PROGRESS NOTES
[facility]  Internal Medicine Clinic     Attending Physician Statement  I have discussed the case, including pertinent history and exam findings with the resident. I have seen and examined the patient and the key elements of the encounter have been performed by me. I agree with the assessment, plan and orders as documented by the resident. I have reviewed all pertinent PMHx, PSHx, FamHx, SocialHx, medications, and allergies and updated history as appropriate. Patient here for routine follow up of blood pressure. Low dose amlodipine started last visit. Still with some dizziness especially at the end of the day, associated with SBP 100s.  - 150s in am. Orthostatic negative. No cerebellar signs. Okay to take BP med at night. Also needs cerumen disimpaction today. (+) neck pain, negative spurling, no radiculopathy. Okay for cervical xray, may need PT/OT. Asbestosis, multiple modules; following with Pulmonology. 1 cytology was inconclusive for malignancy, will discuss this with Dr Thierno Vazquez and arrange for patient follow up. (+) PPD. Latent TB? (-) AFB smear in BAL. Needs to follow up with ID (last seen in Jan 2020). Remainder of medical problems per resident's note. Alessandro Foster MD  9/18/2020 9:24 AM

## 2020-09-18 NOTE — PATIENT INSTRUCTIONS
Please take your meds as prescribed       Basilia Larson MD PGY-2    Patient Education        Epley Maneuver at Home for Vertigo: Exercises  Introduction  Vertigo is a spinning or whirling sensation when you move your head. Your doctor may have moved you in different positions to help your vertigo get better faster. This is called the Epley maneuver. Your doctor also may have asked you to do these exercises at home. Do the exercises as often as your doctor recommends. If your vertigo is getting worse, your doctor may have you change the exercise or stop it. Step 1  Step 1   1. Sit on the edge of a bed or sofa. Step 2   1. Turn your head 45 degrees in the direction your doctor told you to. This should be toward the ear that causes the most vertigo for you. In this picture, the woman is turning toward her left ear. Step 3   1. Tilt yourself backward until you are lying on your back. Your head should still be at a 45-degree turn. Your head should be about midway between looking straight ahead and looking out to your side. Hold for 30 seconds. If you have vertigo, stay in this position until it stops. Step 4   1. Turn your head 90 degrees toward the ear that has the least vertigo. In this picture, the woman is turning to the right because she has vertigo on her left side. The point of your chin should be raised and over your shoulder. Hold for 30 seconds. Step 5   1. Roll onto the side with the least vertigo. You should now be looking at the floor. Hold for 30 seconds. Follow-up care is a key part of your treatment and safety. Be sure to make and go to all appointments, and call your doctor if you are having problems. It's also a good idea to know your test results and keep a list of the medicines you take. Where can you learn more? Go to [email or web address] and sign in to your [employer] account.  Enter Y848 in the [employer] box to learn more about \"Epley Maneuver at Home for Vertigo: Exercises. \"     If you do not have an account, please click on the \"Sign Up Now\" link. Current as of: November 20, 2019               Content Version: 12.5  © 9044-7958 Healthwise, Incorporated. Care instructions adapted under license by [facility] [address] If you have questions about a medical condition or this instruction, always ask your healthcare professional. [place] [address] any warranty or liability for your use of this information.

## 2020-09-24 ENCOUNTER — TELEPHONE (OUTPATIENT)
Dept: PULMONOLOGY | Age: 65
End: 2020-09-24

## 2020-09-24 NOTE — TELEPHONE ENCOUNTER
Call to son in law Darin to review Dr. Bonnie Roberson's follow up from recent [address] Per Dr. Sultana Mayes pt/family advised that Dr. Lauren Vazquez plans to discuss the recent Bronch results and CT images with Dr. Jere Rey and get his opinion as well. Plan for follow up Chest CT in 6 months and advised Darin that office will arrange Chest CT and will mail out appt letter. Plan for follow up by phone after Dr. Sultana Mayes can discuss with Dr. Jere Rey.

## 2020-09-29 ENCOUNTER — TELEPHONE (OUTPATIENT)
Dept: PULMONOLOGY | Age: 65
End: 2020-09-29

## 2020-09-29 NOTE — TELEPHONE ENCOUNTER
Son in law Webster spoke with Dr. Shen Burrell.  Had spoken with Dr. Carolina Hernandez re: recent biopsy/scans and determined that follow up with Chest CT in 6 mos is good plan. Tavon Roman that office has scheduled Chest CT to be done 3/9/2021. Office to mail appt letter.

## 2020-10-06 ENCOUNTER — HOSPITAL ENCOUNTER (OUTPATIENT)
Age: 65
Discharge: HOME | End: 2020-10-08

## 2020-10-06 ENCOUNTER — HOSPITAL ENCOUNTER (OUTPATIENT)
Dept: GENERAL RADIOLOGY | Age: 65
Discharge: HOME | End: 2020-10-08

## 2020-10-06 PROCEDURE — 72050 X-RAY EXAM NECK SPINE 4/5VWS: CPT

## 2020-10-19 LAB
FUNGUS (MYCOLOGY) CULTURE: NORMAL
FUNGUS STAIN: NORMAL

## 2020-11-03 LAB
AFB CULTURE (MYCOBACTERIA): NORMAL
AFB SMEAR: NORMAL

## 2020-11-23 ENCOUNTER — OFFICE VISIT (OUTPATIENT)
Dept: INTERNAL MEDICINE | Age: 65
End: 2020-11-23

## 2020-11-23 VITALS
DIASTOLIC BLOOD PRESSURE: 66 MMHG | HEIGHT: 63 IN | WEIGHT: 126.5 LBS | BODY MASS INDEX: 22.41 KG/M2 | RESPIRATION RATE: 16 BRPM | HEART RATE: 91 BPM | SYSTOLIC BLOOD PRESSURE: 141 MMHG

## 2020-11-23 PROCEDURE — 99213 OFFICE O/P EST LOW 20 MIN: CPT | Performed by: INTERNAL MEDICINE

## 2020-11-23 RX ORDER — CLONIDINE HYDROCHLORIDE 0.1 MG/1
0.1 TABLET ORAL 2 TIMES DAILY PRN
Qty: 30 TABLET | Refills: 1 | Status: SHIPPED
Start: 2020-11-23 | End: 2020-12-22

## 2020-11-23 RX ORDER — AMLODIPINE BESYLATE 2.5 MG/1
2.5 TABLET ORAL DAILY
Qty: 30 TABLET | Refills: 3 | Status: SHIPPED
Start: 2020-11-23 | End: 2021-01-07 | Stop reason: ALTCHOICE

## 2020-11-23 NOTE — PATIENT INSTRUCTIONS
Thank you for coming to your appointment today. Please make sure to do the following:  - Schedule your appointment in 2 months  - Continue the medications as listed    Referrals have been made to ENT: If you do not hear from the office in 1 week, please call the number listed. If your symptoms worsen or do not improve, call for a sooner appointment or call [phone] for the nurse's line.     David Spivey MD

## 2020-11-23 NOTE — PROGRESS NOTES
[facility]  Internal Medicine Residency Program  [facility] Note      SUBJECTIVE:  CC: had concerns including Follow-up; Medication Refill; Hypertension; and Ear Problem. HPI:Nahum Jordan presented to the [facility] for a routine visit  Interpretor #[id number]  Hx of asbestosis and lung nodules and positive PPD/IGRA, hysterectomy due to DIONY III in , HTN. Here for regular follow up. Complains of R ear pain and dizziness and hearing loss. HTN: /76, on amlodipine 2.5 mg. At home 017-361 systolic occasionally 893 when stressed. Pt wants to have a prn med. 1 mg clonidine prn provided. Lung nodule and TB: follows Dr. Taniya Jeff    She still has R ear pain, 50 % less hearing and dizziness while moving her head. +ringing in the R ear. It has been going on for past 6 months. Still using flonase and debrox?, head manuver thought to her on last visit but not very helpful. Will refer to ENT.    HCM:  - needs colonoscopy, referred  - refuses flu vaccine and Tdap    Review Of Systems:  General: no fevers, chills, weight loss or gain.    Ears/Nose/Throat: + R hearing loss, + tinnitus, + vertigo, no nosebleed, nasal congestion, rhinorrhea, sore throat  Respiratory: cough chronic but less than before, no pleuritic chest pain, dyspnea, or wheezing  Cardiovascular: no chest pain, angina, dyspnea on exertion, orthopnea, PND, palpitations, or claudication  Gastrointestinal: no nausea, vomiting, heartburn, diarrhea, constipation, abdominal pain, hematochezia or melena  Genitourinary: no urinary urgency, frequency, dysuria, nocturia, hesitancy, or incontinence  Musculoskeletal: no arthritis, arthralgia, myalgia, weakness, or morning stiffness  Skin: no abnormal pigmentation, rash, itching, masses, hair or nail changes    Current Outpatient Medications on File Prior to Visit   Medication Sig Dispense Refill    fluticasone (FLONASE) 50 MCG/ACT nasal spray 1 spray by Each Nostril route 2 times daily (Patient not taking:

## 2020-11-23 NOTE — PROGRESS NOTES
[facility]  Internal Medicine Clinic    Attending Physician Statement  I have discussed the case, including pertinent history and exam findings with the resident. I have seen and examined the patient and the key elements of the encounter have been performed by me. I agree with the assessment, plan and orders as documented by the resident. I have reviewed all pertinent PMHx, PSHx, FamHx, SocialHx, medications, and allergies and updated history as appropriate. Patient here for routine follow up of medical problems. HTN  - recent initiation amlodipine 2.5 mg but daughter and patient note episodic anxiety episodes, ok to start low dose clonidine on PRN basis. Chronic cough with hx of +PPD and + IGRA  - repeat CT chest ordered per pulmonary to follow lung nodules and plan for possible PET pending interval growth    Right sided hearing loss/tinnitus   - disimpacted in September here and minimal cerumen today   - referral to ENT and likely will need audiometry    Screening:   offered vaccinations -- patient defers; agrees to colonoscopy referral    Remainder of medical problems as per resident note.   Simón Tian D.O.  11/23/2020 9:45 AM

## 2020-11-23 NOTE — PROGRESS NOTES
Patient information obtained with assistance from Dana      All instructions reviewed with pt by dr chris   Printed avs given to pt   Fu appt to be scheduled and pt to be contacted

## 2020-12-01 RX ORDER — FLUTICASONE PROPIONATE 50 MCG
SPRAY, SUSPENSION (ML) NASAL
Qty: 1 BOTTLE | Refills: 2 | Status: SHIPPED
Start: 2020-12-01 | End: 2021-01-07

## 2020-12-02 RX ORDER — CLONIDINE HYDROCHLORIDE 0.1 MG/1
0.1 TABLET ORAL 2 TIMES DAILY PRN
Qty: 180 TABLET | Refills: 0 | OUTPATIENT
Start: 2020-12-02

## 2020-12-03 NOTE — TELEPHONE ENCOUNTER
Reviewed chart- patient initiated on prn clonidine on 11/23. 90 day supply for prn use is not appropriate. Advise only initial script appropriate.

## 2020-12-15 ENCOUNTER — PROCEDURE VISIT (OUTPATIENT)
Dept: AUDIOLOGY | Age: 65
End: 2020-12-15
Payer: COMMERCIAL

## 2020-12-15 ENCOUNTER — OFFICE VISIT (OUTPATIENT)
Dept: ENT CLINIC | Age: 65
End: 2020-12-15
Payer: COMMERCIAL

## 2020-12-15 VITALS — HEIGHT: 64 IN | BODY MASS INDEX: 22.11 KG/M2 | WEIGHT: 129.5 LBS

## 2020-12-15 PROCEDURE — 99204 OFFICE O/P NEW MOD 45 MIN: CPT | Performed by: NURSE PRACTITIONER

## 2020-12-15 PROCEDURE — 92567 TYMPANOMETRY: CPT | Performed by: AUDIOLOGIST

## 2020-12-15 PROCEDURE — 92553 AUDIOMETRY AIR & BONE: CPT | Performed by: AUDIOLOGIST

## 2020-12-15 PROCEDURE — 95992 CANALITH REPOSITIONING PROC: CPT | Performed by: NURSE PRACTITIONER

## 2020-12-15 NOTE — PROGRESS NOTES
This patient was referred for audiometric/tympanometric testing by SAMANTHA Nunez due to new onset tinnitus, right ear. Patient denied ear drainage, ear pain and hearing loss, bilaterally. Audiometry revealed normal hearing sensitivity, at 250-4000Hz, sloping to a mild high-frequency hearing loss, at 6000-8000Hz, bilaterally. Reliability was fair. Speech reception thresholds and word recognition testing could not be performed, due to patient not speaking English. Tympanometry revealed normal middle ear peak pressure and compliance, bilaterally. Ipsilateral acoustic reflexes were absent, bilaterally at 1000Hz. The results were reviewed with the patient. Recommendations for follow up will be made pending physician consult.     Shila Rogers CCC/SATYA  Audiologist  [id number]  NPI#:  [id number]

## 2020-12-15 NOTE — PROGRESS NOTES
[facility] Otolaryngology  Dr. Dawna Fuentes. CYRIL Jerez Ms.Ed. New Consult       Patient Name:  Dez Rivas  :  1955     CHIEF C/O:    Chief Complaint   Patient presents with    Tinnitus     ringing in ears for a couple of months; gets dizziness when moves around the house       HISTORY OBTAINED FROM:  Patient,  Ximena #[id number]    HISTORY OF PRESENT ILLNESS:       Singh Valladares is a 76y.o. year old female, here today for ringing in right ear and dizziness. She states the onset of her dizziness roughly 2 to 3 months ago. She states she has a near constant feeling of lightheadedness and being off balance. She does state that at onset she suffered from room spinning vertigo when she was laying down. She states that this is intermittent at this time. She denies any current nausea but did state mild nausea at the onset of symptoms. She does complain that head movements to the left and right aggravate her symptoms and states that rest helps her symptoms. She also states that she has noticed during this time ringing in the right ear. She does have decreased hearing and states only notices in the right ear. She denies any family history of hearing loss or vertigo. She denies any persistent noise exposure. She denies any previous diagnosis of BPPV or Ménière's disease. She denies any sinus pain, pressure, congestion, rhinorrhea, or postnasal drainage. She denies any pain or pressure in either ear.       Past Medical History:   Diagnosis Date    Hypertension     TB (pulmonary tuberculosis)      Past Surgical History:   Procedure Laterality Date    BRONCHOSCOPY  2016    BRONCHOSCOPY N/A 9/15/2020    BRONCHOSCOPY W/EBUS FNA performed by Zahida Solis MD at [address] [place] [address]  9/15/2020    BRONCHOSCOPY THERAPUTIC ASPIRATION INITIAL performed by Zahida Solis MD at [address] [place] [address]  9/15/2020    BRONCHOSCOPY ALVEOLAR LAVAGE performed by Zahida Solis MD at [facility] END[facility]COPY    BRONCHOSCOPY  9/15/2020    BRONCHOSCOPY ADD ON COMPUTER ASSISTED performed by Cosmo Huang MD at [address] [facility], operated by [facility]  9/15/2020    BRONCHOSCOPY/TRANSBRONCHIAL NEEDLE BIOPSY performed by Cosmo Huang MD at [address] [facility]  7/23/2014    EXAM       Current Outpatient Medications:     cloNIDine (CATAPRES) 0.1 MG tablet, Take 1 tablet by mouth 2 times daily as needed for High Blood Pressure (systolic blood pressure > 160), Disp: 30 tablet, Rfl: 1    fluticasone (FLONASE) 50 MCG/ACT nasal spray, USE 1 SPRAY IN EACH NOSTRIL TWICE DAILY (Patient not taking: Reported on 12/15/2020), Disp: 1 Bottle, Rfl: 2    amLODIPine (NORVASC) 2.5 MG tablet, Take 1 tablet by mouth daily (Patient not taking: Reported on 12/15/2020), Disp: 30 tablet, Rfl: 3  Patient has no known allergies. Social History     Tobacco Use    Smoking status: Never Smoker    Smokeless tobacco: Never Used   Substance Use Topics    Alcohol use: No    Drug use: No     Family History   Problem Relation Age of Onset    Diabetes Mother     Diabetes Father        Review of Systems   Constitutional: Negative. Negative for activity change and appetite change. HENT: Positive for tinnitus. Negative for congestion, sinus pressure and sinus pain. Eyes: Negative. Respiratory: Negative. Negative for shortness of breath and stridor. Cardiovascular: Negative. Negative for chest pain and palpitations. Gastrointestinal: Positive for abdominal pain. Endocrine: Negative. Genitourinary: Negative. Musculoskeletal: Negative. Skin: Negative. Neurological: Positive for dizziness. Hematological: Negative. Psychiatric/Behavioral: Negative. Ht 5' 4\" (1.626 m)   Wt 129 lb 8 oz (58.7 kg)   BMI 22.23 kg/m²   Physical Exam  Constitutional:       Appearance: Normal appearance. HENT:      Head: Normocephalic.       Right Ear: Tympanic membrane, ear canal and external ear normal. Left Ear: Tympanic membrane, ear canal and external ear normal.      Nose: Nose normal. No congestion or rhinorrhea. Mouth/Throat:      Lips: Pink. Mouth: Mucous membranes are moist.      Pharynx: Oropharynx is clear. Eyes:      Conjunctiva/sclera: Conjunctivae normal.      Pupils: Pupils are equal, round, and reactive to light. Neck:      Musculoskeletal: Normal range of motion. No neck rigidity or muscular tenderness. Cardiovascular:      Rate and Rhythm: Normal rate and regular rhythm. Pulses: Normal pulses. Pulmonary:      Effort: Pulmonary effort is normal. No respiratory distress. Breath sounds: No stridor. Musculoskeletal: Normal range of motion. Skin:     General: Skin is warm and dry. Neurological:      General: No focal deficit present. Mental Status: She is alert and oriented to person, place, and time. Psychiatric:         Mood and Affect: Mood normal.         Behavior: Behavior normal.         Thought Content: Thought content normal.         Judgment: Judgment normal.           Audiogram and tympanogram reviewed with patient. Audiogram limited to pure tones due to patient being [facility]). She does exhibit a noise notch between 6008 1000 Hz in both the left and the right ear. Tympanogram reveals type a curves bilaterally. IMPRESSION/PLAN:  [place] hallpike:  LEFT: (positive)   RIGHT: (negative)    Epley was performed on the left x 1     Recheck was negative after 1 Fatimah Park was seen today for tinnitus. Diagnoses and all orders for this visit:    New onset tinnitus, right  -     CASE [place], Audiology, Gustavo German [address], AUE[facility], Audiology, Angy Alvarado    BPPV (benign paroxysmal positional vertigo), left  -     [place] CANALITH REPOSITIONING PROCEDURE, PER DAY      Ana Garcia was seen today for multiple complaints including dizziness and tinnitus.   Upon exam bilateral TMs are normal in appearance without sign of middle ear effusion or retraction. There is no erythema or edema of either ear canal.  Sinuses are patent without signs of swelling or paleness. There is no rhinorrhea or postnasal drainage noted. Roseann-Hallpike maneuver was performed and found positive to the left. Due to the horizontal nystagmus, lateral canal involvement is suggested. Modified BBQ roll technique is used for repositioning with patient stating resolution of symptoms after 1 maneuver. Is also discussed with the patient about using masking techniques such as a fan or noise machine to help mask her tinnitus. She will follow up in 1 week to reassess her vertigo. She is to call for any new or worsening of symptoms.      Geraldine Barrow, MSN, FNP-C  8 [facility], Nose and Throat    The information contained in this note has been dictated using drug and medical speech recognition software and may contain errors

## 2020-12-21 ASSESSMENT — ENCOUNTER SYMPTOMS
RESPIRATORY NEGATIVE: 1
STRIDOR: 0
EYES NEGATIVE: 1
SINUS PRESSURE: 0
SHORTNESS OF BREATH: 0
ABDOMINAL PAIN: 1
SINUS PAIN: 0

## 2020-12-22 ENCOUNTER — OFFICE VISIT (OUTPATIENT)
Dept: ENT CLINIC | Age: 65
End: 2020-12-22
Payer: COMMERCIAL

## 2020-12-22 VITALS — WEIGHT: 129 LBS | BODY MASS INDEX: 22.14 KG/M2

## 2020-12-22 PROCEDURE — 95992 CANALITH REPOSITIONING PROC: CPT | Performed by: NURSE PRACTITIONER

## 2020-12-22 PROCEDURE — 99214 OFFICE O/P EST MOD 30 MIN: CPT | Performed by: NURSE PRACTITIONER

## 2020-12-22 RX ORDER — CLONIDINE HYDROCHLORIDE 0.1 MG/1
TABLET ORAL
Qty: 180 TABLET | Refills: 0 | Status: SHIPPED
Start: 2020-12-22 | End: 2021-03-19

## 2020-12-22 ASSESSMENT — ENCOUNTER SYMPTOMS
GASTROINTESTINAL NEGATIVE: 1
ABDOMINAL PAIN: 0
EYES NEGATIVE: 1
RESPIRATORY NEGATIVE: 1
STRIDOR: 0
SHORTNESS OF BREATH: 0

## 2021-01-04 ENCOUNTER — OFFICE VISIT (OUTPATIENT)
Dept: ENT CLINIC | Age: 66
End: 2021-01-04
Payer: COMMERCIAL

## 2021-01-04 ENCOUNTER — TELEPHONE (OUTPATIENT)
Dept: PULMONOLOGY | Age: 66
End: 2021-01-04

## 2021-01-04 VITALS — BODY MASS INDEX: 22.02 KG/M2 | TEMPERATURE: 97.8 F | HEIGHT: 64 IN | WEIGHT: 129 LBS

## 2021-01-04 DIAGNOSIS — H81.12 BPPV (BENIGN PAROXYSMAL POSITIONAL VERTIGO), LEFT: Primary | ICD-10-CM

## 2021-01-04 PROCEDURE — 95992 CANALITH REPOSITIONING PROC: CPT | Performed by: NURSE PRACTITIONER

## 2021-01-04 PROCEDURE — 99214 OFFICE O/P EST MOD 30 MIN: CPT | Performed by: NURSE PRACTITIONER

## 2021-01-04 ASSESSMENT — ENCOUNTER SYMPTOMS
RESPIRATORY NEGATIVE: 1
SHORTNESS OF BREATH: 0
EYES NEGATIVE: 1
STRIDOR: 0

## 2021-01-04 NOTE — PROGRESS NOTES
[facility] Otolaryngology  Dr. Elaine Thompson. Prasanna Schneider. Ms.Ed        Patient Name:  Silvia Delarosa  :  1955     CHIEF C/O:    Chief Complaint   Patient presents with    Dizziness     Patient states she is still having dizziness every day but its not as bad    Follow-up       HISTORY OBTAINED FROM:  Patient,  Sofia #[id number]    HISTORY OF PRESENT ILLNESS:       Darell Castro is a 76y.o. year old female, here today for follow up of vertigo. She was last seen 2 weeks ago with Epley maneuver performed. She states that since that time she has noticed a decrease in her symptoms and severity of her symptoms. She does complain that over the past 2 days she has noticed sweating when she does have her episodes but denies any other nausea or vomiting. She states the symptoms have been occurring for 6 months and she would like them to be gone.         Past Medical History:   Diagnosis Date    Hypertension     TB (pulmonary tuberculosis)      Past Surgical History:   Procedure Laterality Date    BRONCHOSCOPY  2016    BRONCHOSCOPY N/A 9/15/2020    BRONCHOSCOPY W/EBUS FNA performed by Jignesh Farrell MD at [address] [place] [address]  9/15/2020    BRONCHOSCOPY THERAPUTIC ASPIRATION INITIAL performed by Jignesh Farrell MD at [address] [place] [address]  9/15/2020    BRONCHOSCOPY ALVEOLAR LAVAGE performed by Jignesh Farrell MD at [address] [place] [address]  9/15/2020    BRONCHOSCOPY ADD ON COMPUTER ASSISTED performed by Jignesh Farrell MD at [address] [place] [address]  9/15/2020    BRONCHOSCOPY/TRANSBRONCHIAL NEEDLE BIOPSY performed by Jignesh Farrell MD at [address] [place] [address]  2014    EXAM       Current Outpatient Medications:     cloNIDine (CATAPRES) 0.1 MG tablet, TAKE 1 TABLET 2 TIMES DAILY AS NEEDED FOR HIGH BLOOD PRESSURE (SYSTOLIC BLOOD PRESSURE > 160), Disp: 180 tablet, Rfl: 0    fluticasone (FLONASE) 50 MCG/ACT nasal spray, USE 1 SPRAY IN EACH NOSTRIL TWICE DAILY, Disp: 1 Bottle, Rfl: 2    amLODIPine (NORVASC) 2.5 MG tablet, Take 1 tablet by mouth daily, Disp: 30 tablet, Rfl: 3  Patient has no known allergies. Social History     Tobacco Use    Smoking status: Never Smoker    Smokeless tobacco: Never Used   Substance Use Topics    Alcohol use: No    Drug use: No     Family History   Problem Relation Age of Onset    Diabetes Mother     Diabetes Father        Review of Systems   Constitutional: Negative. Negative for activity change and appetite change. HENT: Negative. Eyes: Negative. Respiratory: Negative. Negative for shortness of breath and stridor. Cardiovascular: Negative. Negative for chest pain and palpitations. Endocrine: Negative. Genitourinary: Negative. Musculoskeletal: Negative. Skin: Negative. Neurological: Positive for dizziness. Hematological: Negative. Psychiatric/Behavioral: Negative. Temp 97.8 °F (36.6 °C)   Ht 5' 4\" (1.626 m)   Wt 129 lb (58.5 kg)   BMI 22.14 kg/m²   Physical Exam  Constitutional:       Appearance: Normal appearance. HENT:      Head: Normocephalic. Right Ear: Tympanic membrane, ear canal and external ear normal.      Left Ear: Tympanic membrane, ear canal and external ear normal.      Nose: Nose normal.      Mouth/Throat:      Lips: Pink. Mouth: Mucous membranes are moist.      Pharynx: Oropharynx is clear. Eyes:      Conjunctiva/sclera: Conjunctivae normal.      Pupils: Pupils are equal, round, and reactive to light. Neck:      Musculoskeletal: Normal range of motion. No neck rigidity or muscular tenderness. Cardiovascular:      Rate and Rhythm: Normal rate and regular rhythm. Pulses: Normal pulses. Pulmonary:      Effort: Pulmonary effort is normal. No respiratory distress. Breath sounds: No stridor. Musculoskeletal: Normal range of motion. Skin:     General: Skin is warm and dry. Neurological:      General: No focal deficit present.       Mental Status: She is alert and oriented to person, place, and time. Psychiatric:         Mood and Affect: Mood normal.         Behavior: Behavior normal.         Thought Content: Thought content normal.         Judgment: Judgment normal.         IMPRESSION/PLAN:    Neha Le was seen today for dizziness and follow-up. Diagnoses and all orders for this visit:    BPPV (benign paroxysmal positional vertigo), left  -     [place] CANALITH REPOSITIONING PROCEDURE, PER DAY      Neha Le is seen today for follow-up of BPPV. Franklin-Hallpike maneuver was performed once again today and found to be positive on the left. The modified BBQ roll technique was once again utilized for canalith repositioning with follow-up Franklin-Hallpike negative for any further symptoms. This time the patient is instructed to continue to rest for 1 more week trying to minimize head movements, turning her body rather than just her head when needed. She will follow-up in 1 week. She is instructed to call with any new or worsening of symptoms prior to her next appointment.     Harjinder Mcgee, JOHANA, FNP-C  8 [facility], Nose and Throat    The information contained in this note has been dictated using drug and medical speech recognition software and may contain errors

## 2021-01-04 NOTE — TELEPHONE ENCOUNTER
Call to son in law Darin and left VM advising appt fro 1/5/21 has been rescheduled to be done post Chest CT scheduled for 3/9/21. Advised Dr. Prachi Mcdermott will see pt in office to review and discuss results of upcoming Chest CT. Appt card mailed and left VM requesting son in  Law contact office to verify new appt date/time.

## 2021-01-07 ENCOUNTER — OFFICE VISIT (OUTPATIENT)
Dept: SURGERY | Age: 66
End: 2021-01-07

## 2021-01-07 VITALS
DIASTOLIC BLOOD PRESSURE: 89 MMHG | WEIGHT: 128 LBS | BODY MASS INDEX: 21.85 KG/M2 | HEIGHT: 64 IN | TEMPERATURE: 97.3 F | RESPIRATION RATE: 16 BRPM | HEART RATE: 77 BPM | SYSTOLIC BLOOD PRESSURE: 152 MMHG | OXYGEN SATURATION: 98 %

## 2021-01-07 DIAGNOSIS — Z12.11 ENCOUNTER FOR SCREENING COLONOSCOPY: Primary | ICD-10-CM

## 2021-01-07 PROCEDURE — 99999 PR OFFICE/OUTPT VISIT,PROCEDURE ONLY: CPT | Performed by: SURGERY

## 2021-01-07 RX ORDER — SODIUM PICOSULFATE, MAGNESIUM OXIDE, AND ANHYDROUS CITRIC ACID 10; 3.5; 12 MG/160ML; G/160ML; G/160ML
1 LIQUID ORAL ONCE
Qty: 1 KIT | Refills: 0 | COMMUNITY
Start: 2021-01-07 | End: 2021-01-07

## 2021-01-07 NOTE — H&P
GENERAL SURGERY  HISTORY AND PHYSICAL  1/7/2021    Chief Complaint   Patient presents with    Consultation     Colonoscopy consult    Colonoscopy     Denies fam hx colon cancer       HPI  Maurice Berrios is a 76 y.o. female who presents with her son-in-law for  consultation for colonoscopy. A video  was used to discuss with the patient. The patient has never had a colonoscopy or EGD. She has had a bronchoscopy before lung biopsy that is negative for malignancy or tuberculosis. Patient denies any abdominal pain. Patient denies any nausea or vomiting. Patient does endorse having some constipation and does sometimes take a long time to use the restroom. There is no pain associated with defecation. patient has not noticed any change in her stool. There has been no bleeding or dark tarry stool. She does not take any anticoagulation. No family history colon cancer    Patient does not smoke or drink or use drugs. Past Medical History:   Diagnosis Date    Hypertension     TB (pulmonary tuberculosis)        Past Surgical History:   Procedure Laterality Date    BRONCHOSCOPY  12/14/2016    BRONCHOSCOPY N/A 9/15/2020    BRONCHOSCOPY W/EBUS FNA performed by Greg Valerio MD at [address] [place] [address]  9/15/2020    BRONCHOSCOPY THERAPUTIC ASPIRATION INITIAL performed by Greg Valerio MD at [address] [place] [address]  9/15/2020    BRONCHOSCOPY ALVEOLAR LAVAGE performed by Greg Valerio MD at [address] [place] [address]  9/15/2020    BRONCHOSCOPY ADD ON COMPUTER ASSISTED performed by Greg Valerio MD at [address] [place] [address]  9/15/2020    BRONCHOSCOPY/TRANSBRONCHIAL NEEDLE BIOPSY performed by Greg Valerio MD at [address] [place] [address]  7/23/2014    EXAM       Prior to Admission medications    Medication Sig Start Date End Date Taking?  Authorizing Provider   cloNIDine (CATAPRES) 0.1 MG tablet TAKE 1 TABLET 2 TIMES DAILY AS NEEDED FOR HIGH BLOOD PRESSURE the last 72 hours. No results found for: LACTA      No results for input(s): INR, PTT in the last 72 hours. Invalid input(s): PT    RADIOLOGY    Xr Cervical Spine (4-5 Views)    Result Date: 10/6/2020  EXAMINATION: XRAY VIEWS OF THE CERVICAL SPINE 10/6/2020 9:40 am COMPARISON: None. HISTORY: ORDERING SYSTEM PROVIDED HISTORY: Neck pain TECHNOLOGIST PROVIDED HISTORY: Reason for exam:->rule out Fx What reading provider will be dictating this exam?->CRC FINDINGS: There is significant calcified pleural plaque suggested in both right and left upper hemithorax. Vertebral body height and alignment are normal.  There is mild disc space narrowing with anterior posterior spurring at C4-5 through C6-7. There is no prevertebral soft tissue swelling. There is no significant foraminal narrowing. Mild degenerative changes in the mid cervical spine.   No fractures identified         ASSESSMENT:  76 y.o. female presenting for screening colonoscopy    PLAN:    -We will plan for colonoscopy in the near future  -Benefits and risks were explained to the patient    Plan discussed with Dr. Mancini First    Electronically signed by Maynor Chinchilla DO on 1/7/21 at 2:27 PM EST

## 2021-01-07 NOTE — H&P (VIEW-ONLY)
GENERAL SURGERY  HISTORY AND PHYSICAL  1/7/2021    Chief Complaint   Patient presents with    Consultation     Colonoscopy consult    Colonoscopy     Denies fam hx colon cancer       HPI  Nasir Camacho is a 76 y.o. female who presents with her son-in-law for  consultation for colonoscopy. A video  was used to discuss with the patient. The patient has never had a colonoscopy or EGD. She has had a bronchoscopy before lung biopsy that is negative for malignancy or tuberculosis. Patient denies any abdominal pain. Patient denies any nausea or vomiting. Patient does endorse having some constipation and does sometimes take a long time to use the restroom. There is no pain associated with defecation. patient has not noticed any change in her stool. There has been no bleeding or dark tarry stool. She does not take any anticoagulation. No family history colon cancer    Patient does not smoke or drink or use drugs. Past Medical History:   Diagnosis Date    Hypertension     TB (pulmonary tuberculosis)        Past Surgical History:   Procedure Laterality Date    BRONCHOSCOPY  12/14/2016    BRONCHOSCOPY N/A 9/15/2020    BRONCHOSCOPY W/EBUS FNA performed by Amy Lanier MD at [address] [place] [address]  9/15/2020    BRONCHOSCOPY THERAPUTIC ASPIRATION INITIAL performed by Amy Lanier MD at [address] [place] [address]  9/15/2020    BRONCHOSCOPY ALVEOLAR LAVAGE performed by Amy Lanier MD at [address] [place] [address]  9/15/2020    BRONCHOSCOPY ADD ON COMPUTER ASSISTED performed by Amy Lanier MD at [address] [place] [address]  9/15/2020    BRONCHOSCOPY/TRANSBRONCHIAL NEEDLE BIOPSY performed by Amy Lanier MD at [address] [place] [address]  7/23/2014    EXAM       Prior to Admission medications    Medication Sig Start Date End Date Taking?  Authorizing Provider cloNIDine (CATAPRES) 0.1 MG tablet TAKE 1 TABLET 2 TIMES DAILY AS NEEDED FOR HIGH BLOOD PRESSURE (SYSTOLIC BLOOD PRESSURE > 160) 12/22/20  Yes Mateo Gearing, DO       No Known Allergies    Family History   Problem Relation Age of Onset    Diabetes Mother     Diabetes Father        Social History     Tobacco Use    Smoking status: Never Smoker    Smokeless tobacco: Never Used   Substance Use Topics    Alcohol use: No    Drug use: No         Review of Systems - History obtained from the patient through an  service  General ROS: negative for - chills or fever  ENT ROS: History of vertigo   Respiratory ROS: Patient denies cough, shortness of breath, or wheezing. However note from Dr. Arguello Speak states that the patient did have chronic cough and increasing shortness of breath for which she underwent recent bronchoscopy with biopsy to evaluate for possible lung malignancy versus TB  Cardiovascular ROS: no chest pain or dyspnea on exertion  Gastrointestinal ROS: no abdominal pain, change in bowel habits, or black or bloody stools  Genito-Urinary ROS: no dysuria, trouble voiding, or hematuria  Musculoskeletal ROS: negative for - joint swelling or muscle pain        PHYSICAL EXAM:    Vitals:    01/07/21 1336   BP: (!) 152/89   Pulse: 77   Resp: 16   Temp: 97.3 °F (36.3 °C)   SpO2: 98%       General Appearance:  awake, alert, oriented, in no acute distress  Skin:  Skin color, texture, turgor normal. No rashes or lesions. Head/face:  NCAT  Eyes:  No gross abnormalities. and Sclera nonicteric  Lungs:  Breathing Pattern: regular, no distress  Heart:  Heart regular rate  Abdomen:  Soft, non-tender, nondistended. No guarding or rigidity  Extremities: Extremities warm to touch, pink, with no edema. LABS:  CBC  No results for input(s): WBC, HGB, HCT, PLT in the last 72 hours. BMP  No results for input(s): NA, K, CL, CO2, BUN, CREATININE, CALCIUM in the last 72 hours.     Invalid input(s): GLU  Liver Function No results for input(s): AMYLASE, LIPASE, BILITOT, BILIDIR, AST, ALT, ALKPHOS, PROT, LABALBU in the last 72 hours. No results found for: LACTA      No results for input(s): INR, PTT in the last 72 hours. Invalid input(s): PT    RADIOLOGY    Xr Cervical Spine (4-5 Views)    Result Date: 10/6/2020  EXAMINATION: XRAY VIEWS OF THE CERVICAL SPINE 10/6/2020 9:40 am COMPARISON: None. HISTORY: ORDERING SYSTEM PROVIDED HISTORY: Neck pain TECHNOLOGIST PROVIDED HISTORY: Reason for exam:->rule out Fx What reading provider will be dictating this exam?->CRC FINDINGS: There is significant calcified pleural plaque suggested in both right and left upper hemithorax. Vertebral body height and alignment are normal.  There is mild disc space narrowing with anterior posterior spurring at C4-5 through C6-7. There is no prevertebral soft tissue swelling. There is no significant foraminal narrowing. Mild degenerative changes in the mid cervical spine.   No fractures identified         ASSESSMENT:  76 y.o. female presenting for screening colonoscopy    PLAN:    -We will plan for colonoscopy in the near future  -Benefits and risks were explained to the patient    Plan discussed with Dr. Dorinda Pisano    Electronically signed by Lucero Musa DO on 1/7/21 at 2:27 PM EST

## 2021-01-07 NOTE — PATIENT INSTRUCTIONS
[address] [place] [address] Surgery  CLENPIQ  COLON PREP FOR COLONOSCOPY OR COLON SURGERY    It is very important that you follow all of the instructions listed on this sheet carefully (they may be slightly different than the directions on the product that you purchase at the pharmacy) to ensure that your colon is adequately cleaned out or your risk of complications could be increased. 2 Days or More Before Endoscopy:  [address] from the pharmacy.  Do not eat corn, tomatoes, peas or watermelon 3 to 5 days before procedure. · Start clear liquid diet 2 days before the procedure  ·   1 Day Before the Endoscopy:   No solid food - only clear liquids (soup, jello, or juice that you can see through with no solid food) for breakfast, lunch and supper. DO NOT drink or eat anything that is red as it will turn the inside of the colon red and look like blood.  Have at least 8 oz or more of clear liquids for breakfast (7 am to 8 am) and lunch (11:30 am to 12:30 pm).  Between 5-9 pm, drink 1 bottle of clenpiq   Over the next 5 hours, drink 40 oz (5 cups of clear liquids)   Can continue to take  clear liquids that night    Day of Endoscopy:   4 hours prior to scheduled time for colonoscopy, drink the second bottle of CLENPIQ followed immediately by at least 24 oz of clear liquids. STOP ALL CLEAR LIQUIDS 2 HOURS PRIOR TO SCHEDULED TIME   If any blood pressure medications or heart medications are due in the morning, you should take them with a sip of water.  Hold AM oral diabetic medications.  Take 1/2 the insulin dose that morning    Instructions for Clear liquid diet  Definition  A clear liquid diet consists of clear liquids, such as water, broth and plain gelatin, that are easily digested and leave no undigested residue in your intestinal tract. Your doctor may prescribe a clear liquid diet before certain medical procedures or if you have certain digestive problems.  Because a clear liquid diet can't provide you with adequate calories and nutrients, it shouldn't be continued for more than a few days. Purpose  A clear liquid diet is often used before tests, procedures or surgeries that require no food in your stomach or intestines, such as before colonoscopy. It may also be recommended as a short-term diet if you have certain digestive problems, such as nausea, vomiting or diarrhea, or after certain types of surgery. Diet details  A clear liquid diet helps maintain adequate hydration, provides some important electrolytes, such as sodium and potassium, and gives some energy at a time when a full diet isn't possible or recommended. The following foods are allowed in a clear liquid diet:    Plain water    Fruit juices without pulp, such as apple juice, grape juice or cranberry juice    Strained lemonade or fruit punch    Clear, fat-free broth (bouillon or consomme)    Clear sodas    Plain gelatin    Honey    Ice pops without bits of fruit or fruit pulp    Tea or coffee without milk or cream    Any foods not on the above list should be avoided. Also, for certain tests, such as colon exams, your doctor may ask you to avoid liquids or gelatin with red coloring. A typical menu on the clear liquid diet may look like this:     Breakfast:  1 glass fruit juice  1 cup coffee or tea (without dairy products)  1 cup broth  1 bowl gelatin     Snack:  1 glass fruit juice  1 bowl gelatin     Lunch:  1 glass fruit juice  1 glass water  1 cup broth  1 bowl gelatin     Snack:  1 ice pop (without fruit pulp)  1 cup coffee or tea (without dairy products) or a soft drink     Dinner:  1 cup juice or water  1 cup broth  1 bowl gelatin  1 cup coffee or tea     Results  Although the clear liquid diet may not be very exciting, it does fulfill its purpose.  It's designed to keep your stomach and intestines clear, limit strain to your digestive system, but keep your body hydrated as you prepare for or recover from a medical procedure. Risks  Because a clear liquid diet can't provide you with adequate calories and nutrients, it shouldn't be used for more than a few days. Only use the clear liquid diet as directed by your doctor. If your doctor prescribes a clear liquid diet before a medical test, be sure to follow the diet instructions exactly. If you don't follow the diet exactly, you risk an inaccurate test and may have to reschedule the procedure for another time. The importance of proper hydration  A colonoscopy prep causes the body to lose a significant amount of fluid and can result in sickness due to dehydration. It's important that you prepare your body by drinking extra clear liquids before the prep. Stay hydrated by drinking all required clear liquids during the prep. Replenish your system by drinking clear liquids after returning home from your colonoscopy.

## 2021-01-08 ENCOUNTER — TELEPHONE (OUTPATIENT)
Dept: SURGERY | Age: 66
End: 2021-01-08

## 2021-01-08 NOTE — TELEPHONE ENCOUNTER
MA Scheduled pt for Colonoscopy on 01/26/2021 at 1:00pm. Pt needs to arrive at [address] [place] [address] at 12:00pm. Patient confirmed date and time. Address and directions given.   Electronically signed by Zac Schneider on 1/8/21 at 2:51 PM EST

## 2021-01-08 NOTE — TELEPHONE ENCOUNTER
Prior Authorization Form:      DEMOGRAPHICS:                     Patient Name:  Rema Ruffin  Patient :  1955            Insurance:  Payor: / No coverage found. Insurance ID Number:    Payor/Plan Subscr  Sex Relation Sub.  Ins. ID Effective Group Num         DIAGNOSIS & PROCEDURE:                       Procedure/Operation: Screening Colonoscopy           CPT Code: 92431    Diagnosis:  Screening    ICD10 Code: Z12.11    Location:  [facility]    Surgeon:  Caryle Cecil Freda Cotta INFORMATION:                          Date: 2021    Time: 1:00pm              Anesthesia:  MAC/TIVA                                                       Status:  Outpatient          Electronically signed by Amrit Louis on 2021 at 2:54 PM

## 2021-01-12 ENCOUNTER — OFFICE VISIT (OUTPATIENT)
Dept: ENT CLINIC | Age: 66
End: 2021-01-12
Payer: COMMERCIAL

## 2021-01-12 VITALS — HEIGHT: 64 IN | BODY MASS INDEX: 21.95 KG/M2 | WEIGHT: 128.6 LBS

## 2021-01-12 DIAGNOSIS — H93.11 TINNITUS OF RIGHT EAR: ICD-10-CM

## 2021-01-12 DIAGNOSIS — H81.12 BPPV (BENIGN PAROXYSMAL POSITIONAL VERTIGO), LEFT: Primary | ICD-10-CM

## 2021-01-12 PROCEDURE — 99214 OFFICE O/P EST MOD 30 MIN: CPT | Performed by: NURSE PRACTITIONER

## 2021-01-12 PROCEDURE — 95992 CANALITH REPOSITIONING PROC: CPT | Performed by: NURSE PRACTITIONER

## 2021-01-12 ASSESSMENT — ENCOUNTER SYMPTOMS
EYES NEGATIVE: 1
SHORTNESS OF BREATH: 0
RESPIRATORY NEGATIVE: 1
STRIDOR: 0

## 2021-01-12 NOTE — PROGRESS NOTES
[facility] Otolaryngology  Dr. Marycruz Burnham. Herson Herr. Ms.Ed        Patient Name:  Deedee Pérez  :  1955     CHIEF C/O:    Chief Complaint   Patient presents with    Dizziness     still having a little dizziness       HISTORY OBTAINED FROM:  Patient, : Jerry Mark #[id number]    HISTORY OF PRESENT ILLNESS:       Neha Le is a 76y.o. year old female, here today for follow up of BPPV. She was last seen 1 week ago with Epley maneuver being performed x2 and stated resolution of her symptoms at that time. Over the past week she states she continues to have symptoms when she is laying down but they have decreased in frequency and severity. She does continue to complain of mild room spinning vertigo when laying down. He denies any nausea. She states that she feels a pressure sensation in her head during the episodes. She continues to complain also of tinnitus and decreased hearing in the right ear. Her daughter is questioning whether she needs hearing aids.             Past Medical History:   Diagnosis Date    Hypertension     TB (pulmonary tuberculosis)      Past Surgical History:   Procedure Laterality Date    BRONCHOSCOPY  2016    BRONCHOSCOPY N/A 9/15/2020    BRONCHOSCOPY W/EBUS FNA performed by Arden Srivastava MD at [address] [place] [address]  9/15/2020    BRONCHOSCOPY THERAPUTIC ASPIRATION INITIAL performed by Arden Srivastava MD at [address] [place] [address]  9/15/2020    BRONCHOSCOPY ALVEOLAR LAVAGE performed by Arden Srivastava MD at [address] [place] [address]  9/15/2020    BRONCHOSCOPY ADD ON COMPUTER ASSISTED performed by Arden Srivastava MD at [address] [place] [address]  9/15/2020    BRONCHOSCOPY/TRANSBRONCHIAL NEEDLE BIOPSY performed by Arden Srivastava MD at [id number] [place] [address]  2014    EXAM       Current Outpatient Medications:     cloNIDine (CATAPRES) 0.1 MG tablet, TAKE 1 TABLET 2 TIMES DAILY AS NEEDED FOR HIGH BLOOD PRESSURE (SYSTOLIC BLOOD PRESSURE > 160), Disp: 180 tablet, Rfl: 0  Patient has no known allergies. Social History     Tobacco Use    Smoking status: Never Smoker    Smokeless tobacco: Never Used   Substance Use Topics    Alcohol use: No    Drug use: No     Family History   Problem Relation Age of Onset    Diabetes Mother     Diabetes Father        Review of Systems   Constitutional: Negative. Negative for activity change and appetite change. HENT: Negative. Eyes: Negative. Respiratory: Negative. Negative for shortness of breath and stridor. Cardiovascular: Negative. Negative for chest pain and palpitations. Endocrine: Negative. Genitourinary: Negative. Musculoskeletal: Negative. Skin: Negative. Neurological: Positive for dizziness. Hematological: Negative. Psychiatric/Behavioral: Negative. Ht 5' 4\" (1.626 m)   Wt 128 lb 9.6 oz (58.3 kg)   BMI 22.07 kg/m²   Physical Exam  Constitutional:       Appearance: Normal appearance. HENT:      Head: Normocephalic. Right Ear: Tympanic membrane, ear canal and external ear normal.      Left Ear: Tympanic membrane, ear canal and external ear normal.      Nose: Nose normal.      Mouth/Throat:      Lips: Pink. Mouth: Mucous membranes are moist.      Pharynx: Oropharynx is clear. Eyes:      Conjunctiva/sclera: Conjunctivae normal.      Pupils: Pupils are equal, round, and reactive to light. Neck:      Musculoskeletal: Normal range of motion. No neck rigidity or muscular tenderness. Cardiovascular:      Rate and Rhythm: Normal rate and regular rhythm. Pulses: Normal pulses. Pulmonary:      Effort: Pulmonary effort is normal. No respiratory distress. Breath sounds: No stridor. Musculoskeletal: Normal range of motion. Skin:     General: Skin is warm and dry. Neurological:      General: No focal deficit present. Mental Status: She is alert and oriented to person, place, and time.    Psychiatric:         Mood and Affect: Mood normal.         Behavior: Behavior normal.         Thought Content: Thought content normal.         Judgment: Judgment normal.         IMPRESSION/PLAN:  Roseann hallpike:  LEFT: (positive)   RIGHT: (negative)    Epley was performed on the left x 1     Recheck was negative after 1 Yassine Chappell is seen today for follow-up of BPPV and continued tinnitus in the right ear. Physical exam is unchanged from previous visits with continued tinnitus in the right ear. Is again explained that her tinnitus is a byproduct of hearing loss which is not at a level significant enough where she should need hearing aids. Orrick-Hallpike maneuver was performed again and found to be positive to the left with horizontal beating nystagmus. Epley maneuver was performed x1 with resolution of her symptoms. She has showed continued improvement each visit compared to her previous. This time is discussed with the patient that we will follow-up again in 1 week and reassess her symptoms. If symptoms are resolved she will be given Jin-Daroff exercises to be performed at home and with continued symptoms we will consider physical therapy for vestibular rehab. She agrees to this plan. She is instructed to call with any new or worsening of symptoms prior to her next appointment.     Joanie Sequeira, JOHANA, FNP-C  [facility], Nose and Throat    The information contained in this note has been dictated using drug and medical speech recognition software and may contain errors

## 2021-01-15 NOTE — PROGRESS NOTES
Patient agreed to COVID test on 1/19/21 at the  [facility]  located at  [address] [place] [address]. between the hours of 6 am- 2:30 pm, instructed to bring ID. Patient instructed to self isolate until day of surgery.

## 2021-01-19 ENCOUNTER — HOSPITAL ENCOUNTER (OUTPATIENT)
Age: 66
Discharge: HOME | End: 2021-01-19
Payer: MEDICAID

## 2021-01-19 ENCOUNTER — OFFICE VISIT (OUTPATIENT)
Dept: ENT CLINIC | Age: 66
End: 2021-01-19
Payer: MEDICAID

## 2021-01-19 VITALS — BODY MASS INDEX: 21.85 KG/M2 | TEMPERATURE: 97.3 F | HEIGHT: 64 IN | WEIGHT: 128 LBS

## 2021-01-19 DIAGNOSIS — U07.1 COVID-19: ICD-10-CM

## 2021-01-19 DIAGNOSIS — H81.12 BPPV (BENIGN PAROXYSMAL POSITIONAL VERTIGO), LEFT: Primary | ICD-10-CM

## 2021-01-19 PROCEDURE — U0003 INFECTIOUS AGENT DETECTION BY NUCLEIC ACID (DNA OR RNA); SEVERE ACUTE RESPIRATORY SYNDROME CORONAVIRUS 2 (SARS-COV-2) (CORONAVIRUS DISEASE [COVID-19]), AMPLIFIED PROBE TECHNIQUE, MAKING USE OF HIGH THROUGHPUT TECHNOLOGIES AS DESCRIBED BY CMS-2020-01-R: HCPCS

## 2021-01-19 PROCEDURE — 99213 OFFICE O/P EST LOW 20 MIN: CPT | Performed by: NURSE PRACTITIONER

## 2021-01-19 ASSESSMENT — ENCOUNTER SYMPTOMS
STRIDOR: 0
SHORTNESS OF BREATH: 0
EYES NEGATIVE: 1
RESPIRATORY NEGATIVE: 1

## 2021-01-19 NOTE — PROGRESS NOTES
dizziness. Diagnoses and all orders for this visit:    BPPV (benign paroxysmal positional vertigo), left      Raquel Yuan is seen today for 1 week follow-up of BPPV. There are no changes to her physical exam from her previous visits. At this time she will be given Jin-Daroff exercises to be performed at home daily. It is stressed to the patient that she needs to maintain controlled body movements turning her entire body rather than just her head when needed. She will follow-up in 1 month. She is instructed to call with any returning symptoms or worsening of her condition.     JOHANA Lai, FNP-C  8 [facility], Nose and Throat    The information contained in this note has been dictated using drug and medical speech recognition software and may contain errors

## 2021-01-20 NOTE — PROGRESS NOTES
[facility] PRE-ADMISSION TESTING ENDOSCOPY INSTRUCTIONS- Merged with [facility]-phone number:[phone]    ENDOSCOPY INSTRUCTIONS:   [x] Bowel prep instructions reviewed. [x] Nothing by mouth after midnight, including gum, candy, mints, or water. Please follow your surgeons instructions if you are required to complete a bowel prep. Colonoscopy- no solid food-only clear liquids the day prior). [x] You may brush your teeth, gargle, but do NOT swallow water. [x] Do not wear makeup, lotions, powders, deodorant. Nail polish as directed by the nurse. [x] Arrange transportation with a responsible adult  to and from the hospital. If you do not have a responsible adult  to transport you, you will need to make arrangements with a medical transportation company (i.e. [employer]. A Uber/taxi/bus is not appropriate unless you are accompanied by a responsible adult ). Arrange for someone to be with you for the remainder of the day and for 24 hours after your procedure due to having had anesthesia. Who will be your  for transportation?___Justo diaz__________   Who will be staying with you for 24 hrs after your procedure?____Justo avinag__________    PARKING INSTRUCTIONS:   [x] Arrival Time:_____1145_______________  · [x]  Arrive to the [facility] entrance for surgery 1/26/21. You will be directed to pre op. [x] To [facility] from [address] [place] [address], upon entering the hospital, take elevator B to the 3rd floor. EDUCATION INSTRUCTIONS:  [x] Bring a complete list of your medications, please write the last time you took the medicine, give this list to the nurse. [x] Take the following medications the morning of surgery with 1-2 ounces of water: See LIST  [x] Stop herbal supplements and vitamins 5 days before your surgery. [] DO NOT take any diabetic medicine the morning of surgery. Follow instructions for insulin the day before surgery.   [] If you are diabetic and your blood sugar is low or you feel symptomatic, you may drink 1-2 ounces of apple juice or take a glucose tablet. The morning of your procedure, you may call the pre-op area if you have concerns about your blood sugar [phone]. [] Use your inhalers the morning of surgery. Bring your emergency inhaler with you day of surgery. [x] Follow physician instructions regarding any blood thinners you may be taking. WHAT TO EXPECT:  [x] The day of your procedure you will be greeted and checked in by the [employer].  In addition, you will be registered in the [place] by a Patient Access Representative. Please bring your photo ID and insurance card. A nurse will greet you in accordance to the time you are needed in the pre-op area to prepare you for surgery. Please do not be discouraged if you are not greeted in the order you arrive as there are many variables that are involved in patient preparation. Your patience is greatly appreciated as you wait for your nurse. Please bring in items such as: books, magazines, newspapers, electronics, or any other items  to occupy your time in the waiting area. [x]  Delays may occur. Staff will make a sincere effort to keep you informed of delays. If any delays occur with your procedure, we apologize ahead of time for your inconvenience as we recognize the value of your time.

## 2021-01-21 LAB
SARS-COV-2: NOT DETECTED
SOURCE: NORMAL

## 2021-01-26 ENCOUNTER — ANESTHESIA (OUTPATIENT)
Dept: ENDOSCOPY | Age: 66
End: 2021-01-26
Payer: MEDICAID

## 2021-01-26 ENCOUNTER — HOSPITAL ENCOUNTER (OUTPATIENT)
Age: 66
Setting detail: OUTPATIENT SURGERY
Discharge: HOME | End: 2021-01-26
Attending: SURGERY | Admitting: SURGERY
Payer: MEDICAID

## 2021-01-26 ENCOUNTER — ANESTHESIA EVENT (OUTPATIENT)
Dept: ENDOSCOPY | Age: 66
End: 2021-01-26
Payer: MEDICAID

## 2021-01-26 VITALS — OXYGEN SATURATION: 98 % | DIASTOLIC BLOOD PRESSURE: 76 MMHG | SYSTOLIC BLOOD PRESSURE: 139 MMHG

## 2021-01-26 VITALS
BODY MASS INDEX: 22.2 KG/M2 | WEIGHT: 130 LBS | DIASTOLIC BLOOD PRESSURE: 74 MMHG | SYSTOLIC BLOOD PRESSURE: 147 MMHG | HEART RATE: 66 BPM | TEMPERATURE: 99 F | HEIGHT: 64 IN | OXYGEN SATURATION: 99 % | RESPIRATION RATE: 16 BRPM

## 2021-01-26 DIAGNOSIS — U07.1 COVID-19: Primary | ICD-10-CM

## 2021-01-26 PROCEDURE — 2709999900 HC NON-CHARGEABLE SUPPLY: Performed by: SURGERY

## 2021-01-26 PROCEDURE — 45385 COLONOSCOPY W/LESION REMOVAL: CPT | Performed by: SURGERY

## 2021-01-26 PROCEDURE — 3700000001 HC ADD 15 MINUTES (ANESTHESIA): Performed by: SURGERY

## 2021-01-26 PROCEDURE — 2580000003 HC RX 258: Performed by: SURGERY

## 2021-01-26 PROCEDURE — 7100000010 HC PHASE II RECOVERY - FIRST 15 MIN: Performed by: SURGERY

## 2021-01-26 PROCEDURE — 88305 TISSUE EXAM BY PATHOLOGIST: CPT

## 2021-01-26 PROCEDURE — 6360000002 HC RX W HCPCS: Performed by: NURSE ANESTHETIST, CERTIFIED REGISTERED

## 2021-01-26 PROCEDURE — 3609010600 HC COLONOSCOPY POLYPECTOMY SNARE/COLD BIOPSY: Performed by: SURGERY

## 2021-01-26 PROCEDURE — 3700000000 HC ANESTHESIA ATTENDED CARE: Performed by: SURGERY

## 2021-01-26 PROCEDURE — 7100000011 HC PHASE II RECOVERY - ADDTL 15 MIN: Performed by: SURGERY

## 2021-01-26 RX ORDER — FENTANYL CITRATE 50 UG/ML
INJECTION, SOLUTION INTRAMUSCULAR; INTRAVENOUS PRN
Status: DISCONTINUED | OUTPATIENT
Start: 2021-01-26 | End: 2021-01-26 | Stop reason: SDUPTHER

## 2021-01-26 RX ORDER — SODIUM CHLORIDE 0.9 % (FLUSH) 0.9 %
10 SYRINGE (ML) INJECTION EVERY 12 HOURS SCHEDULED
Status: DISCONTINUED | OUTPATIENT
Start: 2021-01-26 | End: 2021-01-26 | Stop reason: HOSPADM

## 2021-01-26 RX ORDER — SODIUM CHLORIDE 0.9 % (FLUSH) 0.9 %
10 SYRINGE (ML) INJECTION PRN
Status: DISCONTINUED | OUTPATIENT
Start: 2021-01-26 | End: 2021-01-26 | Stop reason: HOSPADM

## 2021-01-26 RX ORDER — SODIUM CHLORIDE 9 MG/ML
INJECTION, SOLUTION INTRAVENOUS CONTINUOUS
Status: DISCONTINUED | OUTPATIENT
Start: 2021-01-26 | End: 2021-01-26 | Stop reason: HOSPADM

## 2021-01-26 RX ORDER — PROPOFOL 10 MG/ML
INJECTION, EMULSION INTRAVENOUS PRN
Status: DISCONTINUED | OUTPATIENT
Start: 2021-01-26 | End: 2021-01-26 | Stop reason: SDUPTHER

## 2021-01-26 RX ADMIN — FENTANYL CITRATE 50 MCG: 50 INJECTION, SOLUTION INTRAMUSCULAR; INTRAVENOUS at 13:10

## 2021-01-26 RX ADMIN — SODIUM CHLORIDE: 9 INJECTION, SOLUTION INTRAVENOUS at 13:00

## 2021-01-26 RX ADMIN — PROPOFOL 300 MG: 10 INJECTION, EMULSION INTRAVENOUS at 13:10

## 2021-01-26 RX ADMIN — SODIUM CHLORIDE: 9 INJECTION, SOLUTION INTRAVENOUS at 11:45

## 2021-01-26 ASSESSMENT — PAIN SCALES - GENERAL: PAINLEVEL_OUTOF10: 0

## 2021-01-26 ASSESSMENT — PAIN DESCRIPTION - PAIN TYPE: TYPE: SURGICAL PAIN

## 2021-01-26 NOTE — PROGRESS NOTES
Patient admitted to [facility] Patient placed on appropriate monitors. .Family in with patient. Patient on left side. Patient encouraged to let air in colon out.

## 2021-01-26 NOTE — PROGRESS NOTES
Admitted to Same Day Surgery Unit. Preop instructions given to patient & family. Covid Test done:1/19/21    Results:Negative    Self-quarantine guidelines followed since tested? Yes      Any unusual S/S or concerns expressed or observed? No

## 2021-01-26 NOTE — ANESTHESIA PRE PROCEDURE
Department of Anesthesiology  Preprocedure Note       Name:  Russell Celis   Age:  76 y.o.  :  1955                                          MRN:  [id number]         Date:  2021      Surgeon: Patrick Hale):  Marifer Ramírez MD    Procedure: Procedure(s):  COLORECTAL CANCER SCREENING, NOT HIGH RISK    Medications prior to admission:   Prior to Admission medications    Medication Sig Start Date End Date Taking? Authorizing Provider   cloNIDine (CATAPRES) 0.1 MG tablet TAKE 1 TABLET 2 TIMES DAILY AS NEEDED FOR HIGH BLOOD PRESSURE (SYSTOLIC BLOOD PRESSURE > 160)  Patient taking differently: 0.1 mg 2 times daily  20   Jose Jay DO       Current medications:    No current facility-administered medications for this visit. No current outpatient medications on file.      Facility-Administered Medications Ordered in Other Visits   Medication Dose Route Frequency Provider Last Rate Last Admin    0.9 % sodium chloride infusion   Intravenous Continuous Marifer Ramírez  mL/hr at 21 1145 New Bag at 21 1145    sodium chloride flush 0.9 % injection 10 mL  10 mL Intravenous 2 times per day Marifer Ramírez MD        sodium chloride flush 0.9 % injection 10 mL  10 mL Intravenous PRN Marifer Ramírez MD           Allergies:  No Known Allergies    Problem List:    Patient Active Problem List   Diagnosis Code    Abnormal CT scan R93.89    Chronic cough R05    Immigrant with language difficulty Z60.3    Restrictive lung disease J98.4    Pleural plaque due to asbestos exposure J92.0    Lung nodule R91.1    Positive TB test R76.11    Non-recurrent acute suppurative otitis media of right ear without spontaneous rupture of tympanic membrane H66.001    Neck pain M54.2    Dizziness R42    Impacted cerumen of left ear H61.22       Past Medical History:        Diagnosis Date    Hypertension     TB (pulmonary tuberculosis)        Past Surgical History:        Procedure Laterality Date  BRONCHOSCOPY  12/14/2016    BRONCHOSCOPY N/A 9/15/2020    BRONCHOSCOPY W/EBUS FNA performed by Greg Vlaerio MD at [address] [place] [address]  9/15/2020    BRONCHOSCOPY THERAPUTIC ASPIRATION INITIAL performed by Greg Valerio MD at [address] [place] [address]  9/15/2020    BRONCHOSCOPY ALVEOLAR LAVAGE performed by Greg Valerio MD at [address] [place] [address]  9/15/2020    BRONCHOSCOPY ADD ON COMPUTER ASSISTED performed by Greg Valerio MD at [address] [place] [address]  9/15/2020    BRONCHOSCOPY/TRANSBRONCHIAL NEEDLE BIOPSY performed by Greg Valerio MD at [address] [place] [address]  7/23/2014    EXAM       Social History:    Social History     Tobacco Use    Smoking status: Never Smoker    Smokeless tobacco: Never Used   Substance Use Topics    Alcohol use: No                                Counseling given: Not Answered      Vital Signs (Current): There were no vitals filed for this visit.                                            BP Readings from Last 3 Encounters:   01/26/21 (!) 176/77   01/07/21 (!) 152/89   11/23/20 (!) 141/66       NPO Status:                                                                                 BMI:   Wt Readings from Last 3 Encounters:   01/26/21 130 lb (59 kg)   01/19/21 128 lb (58.1 kg)   01/12/21 128 lb 9.6 oz (58.3 kg)     There is no height or weight on file to calculate BMI.    CBC:   Lab Results   Component Value Date    WBC 8.1 03/26/2019    RBC 4.54 03/26/2019    HGB 13.5 03/26/2019    HCT 41.9 03/26/2019    MCV 92.3 03/26/2019    RDW 13.1 03/26/2019     03/26/2019       CMP:   Lab Results   Component Value Date     09/27/2019    K 4.8 09/27/2019     09/27/2019    CO2 24 09/27/2019    BUN 14 09/27/2019    CREATININE 0.6 09/27/2019    GFRAA >60 09/27/2019    LABGLOM >60 09/27/2019    GLUCOSE 104 09/27/2019    PROT 8.6 09/27/2019    CALCIUM 10.0 09/27/2019    BILITOT 0.5 09/27/2019 ALKPHOS 89 09/27/2019    AST 16 09/27/2019    ALT 11 09/27/2019       POC Tests: No results for input(s): POCGLU, POCNA, POCK, POCCL, POCBUN, POCHEMO, POCHCT in the last 72 hours. Coags:   Lab Results   Component Value Date    PROTIME 12.2 12/14/2016    INR 1.1 12/14/2016    APTT 35.0 12/14/2016       HCG (If Applicable):   Lab Results   Component Value Date    PREGTESTUR negative 07/23/2014        ABGs: No results found for: PHART, PO2ART, BRX3ISP, KPR8AYP, BEART, Q1ANUDZY     Type & Screen (If Applicable):  No results found for: LABABO, LABRH    Drug/Infectious Status (If Applicable):  No results found for: HIV, HEPCAB    COVID-19 Screening (If Applicable):   Lab Results   Component Value Date    COVID19 Not Detected 01/19/2021         Anesthesia Evaluation  Patient summary reviewed and Nursing notes reviewed no history of anesthetic complications:   Airway: Mallampati: II        Dental:      Comment: ALL UPPER FRONT TEETH CAPS    Pulmonary:   (+) decreased breath sounds,                            ROS comment: MASS  HISTORY TB   Cardiovascular:    (+) hypertension:,         Rhythm: regular  Rate: normal           Beta Blocker:  Not on Beta Blocker         Neuro/Psych:   Negative Neuro/Psych ROS              GI/Hepatic/Renal:   (+) bowel prep,           Endo/Other:              Pt had no PAT visit       Abdominal:           Vascular: negative vascular ROS. Anesthesia Plan      MAC     ASA 2       Induction: intravenous. Anesthetic plan and risks discussed with patient. Plan discussed with CRNA.                   Ebony Dia MD   1/26/2021

## 2021-01-26 NOTE — PROGRESS NOTES
Discharge instructions given per translation service, medications and follow up instructions reviewed. Patient and family member verbalized understanding, no other noted or stated problems at this time. Patient will follow up with physicians as directed. Patient discharged in stable condition via wheelchair with family and ancillary personnel to vehicle.

## 2021-01-26 NOTE — OP NOTE
[address] [place] [address]  LOWER ENDOSCOPY REPORT    DATE OF PROCEDURE: 1/26/2021    SURGEON: Nya Cruz M.D.    Giovanna Brooks: None    PREOPERATIVE DIAGNOSIS: Low risk colorectal cancer screening    POSTOPERATIVE DIAGNOSIS: Same; redundant sigmoid colon, sigmoid polyp, moderate internal hemorrhoids    OPERATION: Total colonoscopy to cecum with cold snare of sigmoid polyp    ANESTHESIA: Local monitored anesthesia. ESTIMATED BLOOD LOSS: less than 5 ml    COMPLICATIONS: None. SPECIMENS:  Was Obtained: Sigmoid polyp    HISTORY: The patient is a 76y.o. year old female with history of above preop diagnosis. I recommended colonoscopy with possible biopsy or polypectomy and I explained the risk, benefits, expected outcome, and alternatives to the procedure. Risks included but are not limited to bleeding, infection, respiratory distress, hypotension, and perforation of the colon. The patient understands and is in agreement. PROCEDURE: The patient was given IV conscious sedation per anesthesia. The patient was given supplemental oxygen by nasal cannula. I performed a digital rectal exam and no masses were palpated . The colonoscope was inserted per rectum and advanced under direct vision to the cecum without difficulty. The prep was good so exam was adequate. FINDINGS:  Cecum/Ascending colon: appendiceal orifice noted, iliocecal valve visualized, normal    Transverse colon: normal    Descending/Sigmoid colon: abnormal: redundant sigmoid  5 mm sigmoid polyp at 20 cm from anal verge removed with cold snare    Rectum/Anus: examined in normal and retroflexed positions and was notable for moderate internal hemorrhoids    The colon was decompressed and the scope was removed. The withdraw time was approximately 8 minutes. The patient tolerated the procedure well. ASSESSMENT/PLAN:   1. Await pathology of colon polyp  2.  Redundant sigmoid--recommend high-fiber diet since the redundant sigmoid may contribute to constipation  3.  Colorectal Cancer Screening - recommend repeat colonoscopy in 5 years due to polypectomy      Susana Vu MD, FACS  1/26/2021  1:37 PM

## 2021-01-26 NOTE — INTERVAL H&P NOTE
Update History & Physical    The patient's History and Physical of January 7, 2021 was reviewed with the patient and I examined the patient. There was no change. The surgical site was confirmed by the patient and me. Plan: The risks, benefits, expected outcome, and alternative to the recommended procedure have been discussed with the patient. Patient understands and wants to proceed with the procedure.      Electronically signed by Roya Duran MD on 1/26/2021 at 12:18 PM

## 2021-02-04 ENCOUNTER — TELEPHONE (OUTPATIENT)
Dept: PULMONOLOGY | Age: 66
End: 2021-02-04

## 2021-02-04 NOTE — TELEPHONE ENCOUNTER
Mailed a letter to patient informing her that her CT Chest is scheduled for 3-9-21 at 9:00 am at the [facility]. She must arrive by 8:30 am. There is no prep for this test

## 2021-02-16 ENCOUNTER — OFFICE VISIT (OUTPATIENT)
Dept: ENT CLINIC | Age: 66
End: 2021-02-16
Payer: COMMERCIAL

## 2021-02-16 VITALS — BODY MASS INDEX: 22.2 KG/M2 | TEMPERATURE: 97 F | HEIGHT: 64 IN | WEIGHT: 130 LBS

## 2021-02-16 DIAGNOSIS — H81.12 BPPV (BENIGN PAROXYSMAL POSITIONAL VERTIGO), LEFT: Primary | ICD-10-CM

## 2021-02-16 PROCEDURE — 99213 OFFICE O/P EST LOW 20 MIN: CPT | Performed by: NURSE PRACTITIONER

## 2021-02-16 PROCEDURE — 95992 CANALITH REPOSITIONING PROC: CPT | Performed by: NURSE PRACTITIONER

## 2021-02-16 ASSESSMENT — ENCOUNTER SYMPTOMS
SHORTNESS OF BREATH: 0
STRIDOR: 0
RESPIRATORY NEGATIVE: 1
EYES NEGATIVE: 1

## 2021-02-16 NOTE — PROGRESS NOTES
[facility] Otolaryngology  Dr. Maryanne Alves. Davin Sarah. Ms.Ed        Patient Name:  Ciara Allen  :  1955     CHIEF C/O:    Chief Complaint   Patient presents with    Dizziness     patient states she is getting better       HISTORY OBTAINED FROM:  Patient,  Aleksandravikash Sepulvedaa #[id number]    HISTORY OF PRESENT ILLNESS:       Neetu Leroy is a 76y.o. year old female, here today for follow up of BPPV. She was last seen 1 month ago and provided with Jin-Daroff exercises to perform at home which she states she has done. She states her symptoms have been greatly reduced since her initial visit but continues to have one episode every 3 to 4 days. She states that she continues to feel a room spinning sensation when she ambulates that stops when she sits down. She states this is also accompanied by intermittent sensations of being off balance. She denies any nausea. She denies any change or worsening of symptoms with head movements.           Past Medical History:   Diagnosis Date    Hypertension     TB (pulmonary tuberculosis)      Past Surgical History:   Procedure Laterality Date    BRONCHOSCOPY  2016    BRONCHOSCOPY N/A 9/15/2020    BRONCHOSCOPY W/EBUS FNA performed by Suzanna Torrez MD at [address] [place] [address]  9/15/2020    BRONCHOSCOPY THERAPUTIC ASPIRATION INITIAL performed by Suzanna Torrez MD at [address] [place] [address]  9/15/2020    BRONCHOSCOPY ALVEOLAR LAVAGE performed by Suzanna Torrez MD at [address] [place] [address]  9/15/2020    BRONCHOSCOPY ADD ON COMPUTER ASSISTED performed by Suzanna Torrez MD at [address] [place] [address]  9/15/2020    BRONCHOSCOPY/TRANSBRONCHIAL NEEDLE BIOPSY performed by Suzanna Torrez MD at [address] COLONOSCOPY N/A 2021    COLONOSCOPY POLYPECTOMY SNARE/COLD BIOPSY performed by Ben Cummings MD at [address] [facility]  2014    EXAM       Current Outpatient Medications:     cloNIDine (CATAPRES) 0.1 MG tablet, TAKE 1 TABLET 2 TIMES DAILY AS NEEDED FOR HIGH BLOOD PRESSURE (SYSTOLIC BLOOD PRESSURE > 160) (Patient taking differently: 0.1 mg 2 times daily ), Disp: 180 tablet, Rfl: 0  Patient has no known allergies. Social History     Tobacco Use    Smoking status: Never Smoker    Smokeless tobacco: Never Used   Substance Use Topics    Alcohol use: No    Drug use: No     Family History   Problem Relation Age of Onset    Diabetes Mother     Diabetes Father        Review of Systems   Constitutional: Negative. Negative for activity change and appetite change. HENT: Negative. Eyes: Negative. Respiratory: Negative. Negative for shortness of breath and stridor. Cardiovascular: Negative. Negative for chest pain and palpitations. Endocrine: Negative. Genitourinary: Negative. Musculoskeletal: Negative. Skin: Negative. Neurological: Positive for dizziness. Hematological: Negative. Psychiatric/Behavioral: Negative. Temp 97 °F (36.1 °C)   Ht 5' 4\" (1.626 m)   Wt 130 lb (59 kg)   BMI 22.31 kg/m²   Physical Exam  Constitutional:       Appearance: Normal appearance. HENT:      Head: Normocephalic. Right Ear: Tympanic membrane, ear canal and external ear normal.      Left Ear: Tympanic membrane, ear canal and external ear normal.      Nose: Nose normal.      Mouth/Throat:      Lips: Pink. Mouth: Mucous membranes are moist.   Eyes:      Conjunctiva/sclera: Conjunctivae normal.      Pupils: Pupils are equal, round, and reactive to light. Neck:      Musculoskeletal: Normal range of motion. No neck rigidity or muscular tenderness. Cardiovascular:      Rate and Rhythm: Normal rate and regular rhythm. Pulses: Normal pulses. Pulmonary:      Effort: Pulmonary effort is normal. No respiratory distress. Breath sounds: No stridor. Musculoskeletal: Normal range of motion. Skin:     General: Skin is warm and dry.    Neurological:      General: No focal deficit present. Mental Status: She is alert and oriented to person, place, and time. Psychiatric:         Mood and Affect: Mood normal.         Behavior: Behavior normal.         Thought Content: Thought content normal.         Judgment: Judgment normal.         IMPRESSION/PLAN:  [place] hallpike:  LEFT: (positive)   RIGHT: (negative)    Epley was performed on the left x 1     Recheck was negative after 1 Josue Murry was seen today for dizziness. Diagnoses and all orders for this visit:    BPPV (benign paroxysmal positional vertigo), left      Rashmi Hart is seen today for 1 month follow-up of BPPV. There are no changes to her ears or sinuses upon physical exam.  [place]-Hallpike maneuver was performed and found positive on the left but significantly decreased since previous appointment. Epley maneuver was performed x1 with resolution of her symptoms on repeat [place]-Hallpike. At this time the patient will follow up in 2 weeks with the understanding that with any return of symptoms it is recommended that she begin physical therapy for vestibular rehab and her BPPV. She agrees to this plan. She will call for any new or worsening of symptoms prior to her next appointment.     Carlo Vanessa, JOHANA, FNP-C  [facility], Nose and Throat    The information contained in this note has been dictated using drug and medical speech recognition software and may contain errors

## 2021-03-04 ENCOUNTER — OFFICE VISIT (OUTPATIENT)
Dept: ENT CLINIC | Age: 66
End: 2021-03-04
Payer: COMMERCIAL

## 2021-03-04 VITALS — WEIGHT: 132 LBS | HEIGHT: 64 IN | BODY MASS INDEX: 22.53 KG/M2

## 2021-03-04 DIAGNOSIS — H81.12 BPPV (BENIGN PAROXYSMAL POSITIONAL VERTIGO), LEFT: Primary | ICD-10-CM

## 2021-03-04 PROCEDURE — 3017F COLORECTAL CA SCREEN DOC REV: CPT | Performed by: NURSE PRACTITIONER

## 2021-03-04 PROCEDURE — G8484 FLU IMMUNIZE NO ADMIN: HCPCS | Performed by: NURSE PRACTITIONER

## 2021-03-04 PROCEDURE — G8420 CALC BMI NORM PARAMETERS: HCPCS | Performed by: NURSE PRACTITIONER

## 2021-03-04 PROCEDURE — 4040F PNEUMOC VAC/ADMIN/RCVD: CPT | Performed by: NURSE PRACTITIONER

## 2021-03-04 PROCEDURE — 1036F TOBACCO NON-USER: CPT | Performed by: NURSE PRACTITIONER

## 2021-03-04 PROCEDURE — G8427 DOCREV CUR MEDS BY ELIG CLIN: HCPCS | Performed by: NURSE PRACTITIONER

## 2021-03-04 PROCEDURE — G8400 PT W/DXA NO RESULTS DOC: HCPCS | Performed by: NURSE PRACTITIONER

## 2021-03-04 PROCEDURE — 1090F PRES/ABSN URINE INCON ASSESS: CPT | Performed by: NURSE PRACTITIONER

## 2021-03-04 PROCEDURE — 99213 OFFICE O/P EST LOW 20 MIN: CPT | Performed by: NURSE PRACTITIONER

## 2021-03-04 PROCEDURE — 1123F ACP DISCUSS/DSCN MKR DOCD: CPT | Performed by: NURSE PRACTITIONER

## 2021-03-04 ASSESSMENT — ENCOUNTER SYMPTOMS
STRIDOR: 0
RESPIRATORY NEGATIVE: 1
SHORTNESS OF BREATH: 0
EYES NEGATIVE: 1

## 2021-03-04 NOTE — PROGRESS NOTES
[facility] Otolaryngology  Dr. Nita Ramirez. Nancy Flores. Ms.Ed        Patient Name:  Liseth Gomez  :  1955     CHIEF C/O:    Chief Complaint   Patient presents with    Dizziness     VERTIGO        HISTORY OBTAINED FROM:  Patient, daughter,  Nimisha Jones #[id number]    HISTORY OF PRESENT ILLNESS:       Elaine  is a 76y.o. year old female, here today for follow up of bppv. She was last seen 2 weeks ago with Epley maneuver performed and continued decrease in her symptoms. She states she does continue to have symptoms daily but are minor and much less severe than prior to treatment. She denies any nausea. She denies any new changes to her hearing or tinnitus. She denies any new sinus pain or pressure, congestion, rhinorrhea, or postnasal drainage.           Past Medical History:   Diagnosis Date    Hypertension     TB (pulmonary tuberculosis)      Past Surgical History:   Procedure Laterality Date    BRONCHOSCOPY  2016    BRONCHOSCOPY N/A 9/15/2020    BRONCHOSCOPY W/EBUS FNA performed by Selwyn Field MD at [address] [place] [address]  9/15/2020    BRONCHOSCOPY THERAPUTIC ASPIRATION INITIAL performed by Selwyn Field MD at [address] [place] [address]  9/15/2020    BRONCHOSCOPY ALVEOLAR LAVAGE performed by Selwyn Field MD at [address] [place] [address]  9/15/2020    BRONCHOSCOPY ADD ON COMPUTER ASSISTED performed by Selwyn Field MD at [address] [place] [address]  9/15/2020    BRONCHOSCOPY/TRANSBRONCHIAL NEEDLE BIOPSY performed by Selwyn Field MD at [address] COLONOSCOPY N/A 2021    COLONOSCOPY POLYPECTOMY SNARE/COLD BIOPSY performed by Nicolette George MD at [address] [facility]  2014    EXAM       Current Outpatient Medications:     cloNIDine (CATAPRES) 0.1 MG tablet, TAKE 1 TABLET 2 TIMES DAILY AS NEEDED FOR HIGH BLOOD PRESSURE (SYSTOLIC BLOOD PRESSURE > 160) (Patient taking differently: 0.1 mg 2 times daily ), Disp: 180 tablet, Rfl: 0  Patient has no known allergies. Social History     Tobacco Use    Smoking status: Never Smoker    Smokeless tobacco: Never Used   Substance Use Topics    Alcohol use: No    Drug use: No     Family History   Problem Relation Age of Onset    Diabetes Mother     Diabetes Father        Review of Systems   Constitutional: Negative. Negative for activity change and appetite change. HENT: Negative. Eyes: Negative. Respiratory: Negative. Negative for shortness of breath and stridor. Cardiovascular: Negative. Negative for chest pain and palpitations. Endocrine: Negative. Genitourinary: Negative. Musculoskeletal: Negative. Skin: Negative. Neurological: Positive for dizziness. Hematological: Negative. Psychiatric/Behavioral: Negative. Ht 5' 4\" (1.626 m)   Wt 132 lb (59.9 kg)   BMI 22.66 kg/m²   Physical Exam  Constitutional:       Appearance: Normal appearance. HENT:      Head: Normocephalic. Right Ear: Tympanic membrane, ear canal and external ear normal.      Left Ear: Tympanic membrane, ear canal and external ear normal.      Nose: Nose normal.      Mouth/Throat:      Lips: Pink. Mouth: Mucous membranes are moist.   Eyes:      Conjunctiva/sclera: Conjunctivae normal.      Pupils: Pupils are equal, round, and reactive to light. Neck:      Musculoskeletal: Normal range of motion. No neck rigidity or muscular tenderness. Cardiovascular:      Rate and Rhythm: Normal rate and regular rhythm. Pulses: Normal pulses. Pulmonary:      Effort: Pulmonary effort is normal. No respiratory distress. Breath sounds: No stridor. Musculoskeletal: Normal range of motion. Skin:     General: Skin is warm and dry. Neurological:      General: No focal deficit present. Mental Status: She is alert and oriented to person, place, and time.    Psychiatric:         Mood and Affect: Mood normal.         Behavior: Behavior normal.         Thought Content: Thought content normal.         Judgment: Judgment normal.         IMPRESSION/PLAN:  Arianna Saravia was seen today for dizziness. Diagnoses and all orders for this visit:    BPPV (benign paroxysmal positional vertigo), left  -     [facility], \[facility] [place] [facility]\""      Arianna Saravia is seen today for follow-up of BPPV. On exam bilateral TMs are normal in appearance without sign of middle ear effusion or retraction. Is no erythema or edema of either ear canal.  Sinuses are patent bilaterally without swelling or pallor appearance. There is no rhinorrhea or postnasal drainage observed. At this time to her continued symptoms despite several visits and several Epley maneuvers, it is recommended that the patient begin physical therapy which she does agree to. She would like to use [employer] and physical therapy for her treatment. She will follow-up in 6 weeks to assess her symptoms. If symptoms persist at this time it is recommended that she undergo a VNG to assess her vestibular organ or other possible causes for her symptoms. She is instructed to call with any new or worsening of symptoms prior to her next appointment.     Heber Mcclendon, MSN, FNP-C  8 [facility], Nose and Throat    The information contained in this note has been dictated using drug and medical speech recognition software and may contain errors

## 2021-03-09 ENCOUNTER — HOSPITAL ENCOUNTER (OUTPATIENT)
Dept: CT IMAGING | Age: 66
Discharge: HOME | End: 2021-03-11
Payer: COMMERCIAL

## 2021-03-09 DIAGNOSIS — R91.1 LUNG NODULE: ICD-10-CM

## 2021-03-09 PROCEDURE — 71250 CT THORAX DX C-: CPT

## 2021-03-16 ENCOUNTER — EVALUATION (OUTPATIENT)
Dept: PHYSICAL THERAPY | Age: 66
End: 2021-03-16
Payer: COMMERCIAL

## 2021-03-16 DIAGNOSIS — H81.12 BPPV (BENIGN PAROXYSMAL POSITIONAL VERTIGO), LEFT: Primary | ICD-10-CM

## 2021-03-16 PROCEDURE — 97161 PT EVAL LOW COMPLEX 20 MIN: CPT | Performed by: PHYSICAL THERAPIST

## 2021-03-16 PROCEDURE — 97112 NEUROMUSCULAR REEDUCATION: CPT | Performed by: PHYSICAL THERAPIST

## 2021-03-16 NOTE — PROGRESS NOTES
Physical Therapy Daily Treatment Note    Date: 3/16/2021  Patient Name: Jomar Krishnan  : 1955   MRN: [id number]  [facility]: May 2020  DOSx: -  Referring Provider: SUSAN Tidwell - CNP  [address],  [facility]     Medical Diagnosis:    Diagnosis Orders   1. BPPV (benign paroxysmal positional vertigo), left       Outcome Measure:  Lower Extremity Functional Scale (LEFS) 44% impairment    S: See eval  O:  Time [id number]     Visit 1     Pain 0/10     ROM      Manual maneuvers      Roseann Hallpike R                      L   negative     positive slight      Epley x 1     Exercise      Nustep        Sit/Stands 12/30 sec     TUG test 12 sec     Gait training 100 feet unsteady      NMR Balance activities to aid in safe community and home ambulation    VOR                        vertical                              horizontal  x 10   x 10     Saccades  x 10     Smooth Pursuit  x 30 sec     ABCD      Ball pass eyes ball               Eyes outside      Diagonal ball chops            Marching Gait      Sidestepping      Gait with head turns f/e                                  Rot                                  LF   2 x 50 feet  2 x 50 feet  2 x 50 feet minimal dizziness  minimal dizziness  minimal dizziness  sll shuffling steps    Ball press up squat eyes out   eyes on ball      90* turn step up                  A:  Tolerated well. Above added to written HEP.   P: Continue with rehab plan  Carlos Reed PT    Treatment Charges: Mins Units   Initial Evaluation 25 1   Re-Evaluation     Ther Exercise         TE     Manual Therapy     MT     Ther Activities        TA     Gait Training          GT     Neuro Re-education NR 25 2   Modalities     Non-Billable Service Time 5    Other     Total Time/Units 55 3

## 2021-03-16 NOTE — PROGRESS NOTES
[facility] OUTPATIENT REHABILITATION  PHYSICAL THERAPY INITIAL EVALUATION      Date: 3/16/2021  Patient Name: Yony Yeboah  : 1955   MRN: [id number]  Referring Provider: SUSAN Corona - [facility]  [address],  [facility]     Medical Diagnosis: BPPV (benign paroxysmal positional vertigo), left [H81.12]  DOInjury: May 2020  DOSx: -  Treatment Diagnosis:   Diagnosis Orders   1. BPPV (benign paroxysmal positional vertigo), left         Past Medical History:  Past Medical History:   Diagnosis Date    Hypertension     TB (pulmonary tuberculosis)      Past Surgical History:   Procedure Laterality Date    BRONCHOSCOPY  2016    BRONCHOSCOPY N/A 9/15/2020    BRONCHOSCOPY W/EBUS FNA performed by Serena Her MD at [address] [place] [address]  9/15/2020    BRONCHOSCOPY THERAPUTIC ASPIRATION INITIAL performed by Serena Her MD at [address] [place] [address]  9/15/2020    BRONCHOSCOPY ALVEOLAR LAVAGE performed by Serena Her MD at [address] [place] [address]  9/15/2020    BRONCHOSCOPY ADD ON COMPUTER ASSISTED performed by Serena Her MD at [address] [place] [address]  9/15/2020    BRONCHOSCOPY/TRANSBRONCHIAL NEEDLE BIOPSY performed by Serena Her MD at [address] COLONOSCOPY N/A 2021    COLONOSCOPY POLYPECTOMY SNARE/COLD BIOPSY performed by Cornelius Muñiz MD at [address] [facility]  2014    EXAM       Medications:   Current Outpatient Medications   Medication Sig Dispense Refill    cloNIDine (CATAPRES) 0.1 MG tablet TAKE 1 TABLET 2 TIMES DAILY AS NEEDED FOR HIGH BLOOD PRESSURE (SYSTOLIC BLOOD PRESSURE > 160) (Patient taking differently: 0.1 mg 2 times daily ) 180 tablet 0     No current facility-administered medications for this visit. Mechanism of Injury: insidious    Chief Complaint:   dizzy, feeling little better. Touch back of neck dizzy.  Right side loss of hearing ~70% per daughter    Imaging results: Ct Chest Wo Contrast    Result Date: 3/9/2021  EXAMINATION: CT OF THE CHEST WITHOUT CONTRAST 3/9/2021 7:50 am TECHNIQUE: CT of the chest was performed without the administration of intravenous contrast. Multiplanar reformatted images are provided for review. Dose modulation, iterative reconstruction, and/or weight based adjustment of the mA/kV was utilized to reduce the radiation dose to as low as reasonably achievable. COMPARISON: 08/31/2020 HISTORY: ORDERING SYSTEM PROVIDED HISTORY: Lung nodule TECHNOLOGIST PROVIDED HISTORY: ENB PROTOCOL Reason for exam:->Lung Nodule What reading provider will be dictating this exam?->CRC FINDINGS: Mediastinum: There is mild aortic atherosclerotic calcification, with no aneurysm. No pericardial effusion. Subcentimeter mediastinal lymph nodes are stable. Lungs/pleura: Prominent bilateral calcified pleural plaques are identified. There is stable nodular density abutting an anterior right upper lobe plaque, likely related to rounded atelectasis. Previously seen ground-glass focal density in the right upper lobe, axial image 43 appears stable. The lobular ground-glass density in the left upper lobe measuring up to 2.6 cm, is not significantly changed in size allowing for differences in measurement technique. There is a stable 3 mm ground-glass focus in the left upper lobe, axial image 37. The platelike and linear opacities in bilateral lung bases are stable, related to atelectasis and/or scarring. No new nodules are identified. Upper Abdomen: No acute abnormality. Soft Tissues/Bones: No acute abnormality     No significant change since the prior study. There is stable appearance of ground-glass opacity in the left upper lobe. Other smaller ground-glass nodular opacities are stable. There are again seen bilateral calcified pleural plaques, with a focus of probable rounded atelectasis in the right upper lobe.        Previous PT: none    Medical Management for Current Problem: none    Pain: none     Occupation: retired. Exercise regimen: walking some     Hobbies: cooking      Contraindications/Precautions: none    Cervical: Forward Head   [] Normal   [x]Mild   [] Moderate   []Severe     Modified Vertebral Artery Test (mVAT): negative    Occulomotor Exam:       Spontaneous Nystagmus wfl Fixation present      Gaze 30* L/R, U/D wfl      Smooth Pursuit Slight nystagmus with horizontal movement      Saccades wfl   Roll Test:     Right    negative      Left    negative       Motion Sensitivity Quotient:   Baseline Symptoms Intensity    (0-5) Duration     5-10 s =1    11-30s =2      >30 s =3 Score     (Intensity + Duration)   1. Sitting to supine 2 1    2. Supine to left side 2 1    3. Supine to right side 1 1    4. Supine to sitting 3 1    5. L Roseann-Hallpike 2 1    6. Up from L DH 4 2    7. R Roseann-Hallpike 0     8. Up from [address] 3 1    9. Sitting, nose to L knee 1 1    10. Head up from L  knee 1 1    11. Sitting, nose to R knee  1 1    12. Head up from R knee 1 1    13. Sitting head turns (x) 2 1    14. Sitting head pitches (5x) 2 1    15. In stance, 180* turn to L 1 1    16. In stance, 180* turn to R 0     Total Score 26 15 30   Total Score x total # provoking positions)/20.48     0-10 = Mild  11-30 = Moderate     >30 = Severe       Gait unsteady  Gait with head turns:   Cerv  Flex/ext unsteady shuffling steps    LF unsteady shuffling steps    Rotation slight unsteady slight shuffling steps      360* Turns increased dizziness unsteady    Postural Control Tests:  Clinical Test of Sensory Interaction for Balance (CTSIB) performed in Romberg stance  CONDITION TIME (sec) STRATEGY SWAY    Eyes open, firm surface 30 ankle min    Eyes closed, firm surface 30 ankle and hip mod      Single Leg Stance (SLS)  Eyes open, SLS Left 27 Ankle, hip and step mod   Eyes open, SLS right 10 ankle, hip and step max         TUG Test:  12  Seconds     An older adult who takes ? 12 seconds to complete the TUG is at high risk for falling. 30-Sec. Chair Stand Test:  Number:  12    Score: wfl          Chair Stand--Below Average Scores Age  Men  Women    60-64  < 14  < 12    65-69  < 12  < 11    70-74  < 12  < 10    75-79  < 11  < 10    80-84  < 10  < 9    85-89  < 8  < 8    90-94  < 7  < 4         ASSESSMENT     Outcome Measure:   Lower Extremity Functional Scale (LEFS) 44% impairment    PROBLEMS FOUND DURING EVALUATION  · Motion Sensitivity Quotient (MSQ): 30 Moderate Impairment  · Nystagmus/vertigo with left Roseann hallpike  · Lower Extremity Functional Scale (LEFS) 44% impairment  · Increased unsteadiness, shuffling steps during gait with head turns  · Decreased balance with single leg stance    [x] There are no barriers affecting plan of care or recovery    [] Barriers to this patient's plan of care or recovery include.     Domestic Concerns:  [x] No  [] Yes:    SHORT TERM GOALS  · Decreased MSQ: 15 moderate impairment  · Patient without nystagmus/vertigo with Daniel Smiley  · Lower Extremity Functional Scale (LEFS) 30% impairment  · Increased balance with single leg stance and during gait with head turns   · Patient performing HEP      LONG TERM GOALS  · Decreased MSQ: 5 minimal impairment  · Patient without nystagmus/vertigo with Daniel Smiley independent ritter daroff exercises  · Lower Extremity Functional Scale (LEFS) 15% impairment  · Increased balance with single leg stance and during gait with head turns, no Loss of balance or increased unsteadiness  · Patient indep HEP      Patient Goals: get rid of dizziness    Rehab Potential: good     Rehab Potential: [x] Good  [] Fair  [] Poor    PLAN       Treatment Plan:  [x] Therapeutic Exercise  [x] Therapeutic Activity  [x] Neuromuscular Re-education   [x] Gait Training  [x] Balance Training  [x] Habituation Exercises  [x] Vestibular Exercises  []  Manual Therapy  [] Work/Sport specific activities  [] Aerobic conditioning  [] Pain Neuroscience Physician's Printed Name:                                           [de-identified] Signature:                                                               Date:

## 2021-03-19 DIAGNOSIS — I10 ESSENTIAL HYPERTENSION: ICD-10-CM

## 2021-03-19 NOTE — TELEPHONE ENCOUNTER
Last Appointment:  11/23/20  Future Appointments   Date Time Provider García Fowler   3/23/2021  9:00 AM Red Okeefe PTA [facility] PT [facility]   3/25/2021  9:00 AM Red Okeefe PTA [facility] PT [facility]   4/15/2021  8:15 AM SUSAN Rausch - CNP [facility]   8/9/2021 11:30 AM Isabela Hidalgo MD [facility]      Pt will need notified of need to schedule follow up appointment.

## 2021-03-20 RX ORDER — CLONIDINE HYDROCHLORIDE 0.1 MG/1
TABLET ORAL
Qty: 60 TABLET | Refills: 0 | Status: SHIPPED
Start: 2021-03-20 | End: 2021-09-07 | Stop reason: SDUPTHER

## 2021-03-23 ENCOUNTER — TREATMENT (OUTPATIENT)
Dept: PHYSICAL THERAPY | Age: 66
End: 2021-03-23
Payer: COMMERCIAL

## 2021-03-23 DIAGNOSIS — H81.12 BPPV (BENIGN PAROXYSMAL POSITIONAL VERTIGO), LEFT: Primary | ICD-10-CM

## 2021-03-23 PROCEDURE — 97112 NEUROMUSCULAR REEDUCATION: CPT

## 2021-03-23 NOTE — PROGRESS NOTES
Physical Therapy Daily Treatment Note    Date: 3/23/2021  Patient Name: Mason Young  : 1955   MRN: [id number]  [facility]: May 2020  DOSx: -  Referring Provider: No referring provider defined for this encounter. Medical Diagnosis:    Diagnosis Orders   1. BPPV (benign paroxysmal positional vertigo), left       Outcome Measure:  Lower Extremity Functional Scale (LEFS) 44% impairment    S: See eval  O:  Time 6440-9832     Visit 1     Pain 0/10     ROM      Manual maneuvers      Roseann Hallpike R                      L   negative     positive slight      Epley x 1     Exercise      Nustep   L5/ Head movements    Sit/Stands      TUG test      Gait training 325 ft unsteady with turns     NMR Balance activities to aid in safe community and home ambulation    VOR                        vertical                              horizontal  x 10   x 10 Min dizziness    Saccades  x 10     Smooth Pursuit  x 30 sec     ABCD      Ball pass eyes ball               Eyes outside      Diagonal ball chops 10x each direction           Min dizziness    Nose to knee 10x each knee           Min dizziness    Marching Gait      Sidestepping      Gait with head turns f/e                                  Rot                                  LF                                360   2 x 50 feet  2 x 50 feet  2 x 50 feet  2 x 50 ft minimal dizziness  minimal dizziness  minimal dizziness  Mod dizziness    Ball press up squat eyes out   eyes on ball      90* turn step up 10x ea side No dizziness                A:  Tolerated well. Above added to written HEP.   P: Continue with rehab plan  Clementine Alvarez PTA    Treatment Charges: Mins Units   Initial Evaluation     Re-Evaluation     Ther Exercise         TE     Manual Therapy     MT     Ther Activities        TA     Gait Training          GT     Neuro Re-education NR 45 3   Modalities     Non-Billable Service Time     Other     Total Time/Units 45 3

## 2021-04-01 ENCOUNTER — TREATMENT (OUTPATIENT)
Dept: PHYSICAL THERAPY | Age: 66
End: 2021-04-01
Payer: COMMERCIAL

## 2021-04-01 DIAGNOSIS — H81.12 BPPV (BENIGN PAROXYSMAL POSITIONAL VERTIGO), LEFT: Primary | ICD-10-CM

## 2021-04-01 PROCEDURE — 97112 NEUROMUSCULAR REEDUCATION: CPT

## 2021-04-01 NOTE — PROGRESS NOTES
Physical Therapy Daily Treatment Note    Date: 2021  Patient Name: Dewayne Spring  : 1955   MRN: [id number]  [facility]: May 2020  DOSx: -  Referring Provider: No referring provider defined for this encounter. Medical Diagnosis:    Diagnosis Orders   1. BPPV (benign paroxysmal positional vertigo), left       Outcome Measure:  Lower Extremity Functional Scale (LEFS) 44% impairment    S: feels like it has improved a little  O:  Time 828-900     Visit 1     Pain 0/10     ROM      Manual maneuvers      [place] Hallpike R                      L   negative     positive slight      Epley x 1     Exercise      Nustep   L5/5 min Head movements    Sit/Stands      TUG test      Gait training 325 ft unsteady with turns     NMR Balance activities to aid in safe community and home ambulation    VOR                        vertical                              horizontal  x 10   x 10 Min dizziness    Saccades  x 10     Smooth Pursuit  x 30 sec     ABCD      Ball pass eyes ball               Eyes outside      Diagonal ball chops/overhead 10x each direction  10x           Min dizziness    Nose to knee 10x each knee           Min dizziness    Marching Gait      Sidestepping      Gait with head turns f/e                                  Rot                                  LF                                360   2 x 50 feet  2 x 50 feet  2 x 50 feet  2 x 50 ft minimal dizziness  minimal dizziness  minimal dizziness  Mod dizziness    Ball press up squat eyes out   eyes on ball      90* turn step up                A:  Tolerated well. Above added to written HEP.   P: Continue with rehab plan  Sg Crowley PTA    Treatment Charges: Mins Units   Initial Evaluation     Re-Evaluation     Ther Exercise         TE     Manual Therapy     MT     Ther Activities        TA     Gait Training          GT     Neuro Re-education NR 30 2   Modalities     Non-Billable Service Time     Other     Total Time/Units 30 2

## 2021-04-02 ENCOUNTER — TREATMENT (OUTPATIENT)
Dept: PHYSICAL THERAPY | Age: 66
End: 2021-04-02
Payer: COMMERCIAL

## 2021-04-02 DIAGNOSIS — H81.12 BPPV (BENIGN PAROXYSMAL POSITIONAL VERTIGO), LEFT: Primary | ICD-10-CM

## 2021-04-02 PROCEDURE — 97112 NEUROMUSCULAR REEDUCATION: CPT

## 2021-04-02 NOTE — PROGRESS NOTES
Physical Therapy Daily Treatment Note    Date: 2021  Patient Name: Nohemy Barahona  : 1955   MRN: [id number]  [facility]: May 2020  DOSx: -  Referring Provider:  SUSAN Roa - CNP    Medical Diagnosis:    Diagnosis Orders   1. BPPV (benign paroxysmal positional vertigo), left       Outcome Measure:  Lower Extremity Functional Scale (LEFS) 44% impairment    S: feels like it has improved a little  O:  Time 195-835     Visit 4     Pain 0/10     ROM      Manual maneuvers      [place] Hallpike R                      L   negative     positive slight      Epley x 1     Exercise      Nustep   L5/5 min Head movements    Sit/Stands      TUG test      Gait training 325 ft unsteady with turns     NMR Balance activities to aid in safe community and home ambulation    VOR                        vertical                              horizontal  x 10   x 10 Min dizziness    Saccades  x 10     Smooth Pursuit  x 30 sec     ABCD      Ball pass eyes ball               Eyes outside      Diagonal ball chops/overhead 10x each direction  10x           Min dizziness    Nose to knee 10x each knee           Min dizziness    Marching Gait      Sidestepping      Gait with head turns f/e                                  Rot                                  LF                                360   2 x 50 feet  2 x 50 feet  2 x 50 feet  2 x 50 ft minimal dizziness  minimal dizziness  minimal dizziness  min dizziness    Ball press up squat eyes out   eyes on ball      90* turn step up                A:  Tolerated well. Increased dizziness when pt turns head to the left. Has Loss of hearing in the right ear.   P: Continue with rehab plan  Juan Manuel Mac PTA    Treatment Charges: Mins Units   Initial Evaluation     Re-Evaluation     Ther Exercise         TE     Manual Therapy     MT     Ther Activities        TA     Gait Training          GT     Neuro Re-education NR 40 3   Modalities     Non-Billable Service Time     Other     Total Time/Units 40 3

## 2021-04-05 ENCOUNTER — TREATMENT (OUTPATIENT)
Dept: PHYSICAL THERAPY | Age: 66
End: 2021-04-05
Payer: COMMERCIAL

## 2021-04-05 DIAGNOSIS — H81.12 BPPV (BENIGN PAROXYSMAL POSITIONAL VERTIGO), LEFT: Primary | ICD-10-CM

## 2021-04-05 PROCEDURE — 97112 NEUROMUSCULAR REEDUCATION: CPT

## 2021-04-15 ENCOUNTER — TREATMENT (OUTPATIENT)
Dept: PHYSICAL THERAPY | Age: 66
End: 2021-04-15
Payer: COMMERCIAL

## 2021-04-15 ENCOUNTER — OFFICE VISIT (OUTPATIENT)
Dept: ENT CLINIC | Age: 66
End: 2021-04-15
Payer: COMMERCIAL

## 2021-04-15 VITALS
BODY MASS INDEX: 22.06 KG/M2 | HEIGHT: 64 IN | SYSTOLIC BLOOD PRESSURE: 119 MMHG | WEIGHT: 129.2 LBS | DIASTOLIC BLOOD PRESSURE: 68 MMHG | HEART RATE: 74 BPM

## 2021-04-15 DIAGNOSIS — H81.12 BPPV (BENIGN PAROXYSMAL POSITIONAL VERTIGO), LEFT: Primary | ICD-10-CM

## 2021-04-15 PROCEDURE — 97112 NEUROMUSCULAR REEDUCATION: CPT

## 2021-04-15 PROCEDURE — G8400 PT W/DXA NO RESULTS DOC: HCPCS | Performed by: NURSE PRACTITIONER

## 2021-04-15 PROCEDURE — 1123F ACP DISCUSS/DSCN MKR DOCD: CPT | Performed by: NURSE PRACTITIONER

## 2021-04-15 PROCEDURE — 99213 OFFICE O/P EST LOW 20 MIN: CPT | Performed by: NURSE PRACTITIONER

## 2021-04-15 PROCEDURE — G8420 CALC BMI NORM PARAMETERS: HCPCS | Performed by: NURSE PRACTITIONER

## 2021-04-15 PROCEDURE — 1090F PRES/ABSN URINE INCON ASSESS: CPT | Performed by: NURSE PRACTITIONER

## 2021-04-15 PROCEDURE — 4040F PNEUMOC VAC/ADMIN/RCVD: CPT | Performed by: NURSE PRACTITIONER

## 2021-04-15 PROCEDURE — 3017F COLORECTAL CA SCREEN DOC REV: CPT | Performed by: NURSE PRACTITIONER

## 2021-04-15 PROCEDURE — 1036F TOBACCO NON-USER: CPT | Performed by: NURSE PRACTITIONER

## 2021-04-15 PROCEDURE — G8427 DOCREV CUR MEDS BY ELIG CLIN: HCPCS | Performed by: NURSE PRACTITIONER

## 2021-04-15 NOTE — PROGRESS NOTES
Physical Therapy Daily Treatment Note    Date: 4/15/2021  Patient Name: Raquel   : 1955   MRN: [id number]  [facility]: May 2020  DOSx: -  Referring Provider:  SUSAN Luna - CNP    Medical Diagnosis:    Diagnosis Orders   1. BPPV (benign paroxysmal positional vertigo), left       Outcome Measure:  Lower Extremity Functional Scale (LEFS) 44% impairment    S: pt reports just came from the ENT drEsme And wants her to continue 4 more weeks. O:  Time 815-855     Visit      Pain 0/10     ROM      Manual maneuvers      Roseann Hallpike R                      L   negative     positive slight      Epley x 1     Exercise      Nustep   L5/5 min Head movements    Sit/Stands      TUG test 13 sec Min dizziness    Gait training 325 ft unsteady with turns          VOR                        vertical                              horizontal  x 10   x 10 Min dizziness    Saccades  x 10     Smooth Pursuit  x 30 sec     ABCD X 10     Roll to right side then onto back  Left side then back 10x     10x            Min dizziness              Min dizziness    Diagonal ball chops/overhead 10x each direction  10x           Min dizziness    Nose to knee 10x each knee           Min dizziness    Marching Gait      Sidestepping      Gait with head turns f/e                                  Rot                                  LF                                360   2 x 50 feet  2 x 50 feet  2 x 50 feet  2 x 50 ft minimal dizziness  minimal dizziness  minimal dizziness  min dizziness          90* turn step up 10x ea side       Min dizziness                A:  Tolerated well. Increased dizziness when pt turns head to the left. Has Loss of hearing in the right ear.   P: Continue with rehab plan  Bernard Duran, PTA    Treatment Charges: Mins Units   Initial Evaluation     Re-Evaluation     Ther Exercise         TE     Manual Therapy     MT     Ther Activities        TA     Gait Training          GT     Neuro Re-education NR 40 3 Modalities     Non-Billable Service Time     Other     Total Time/Units 40 3

## 2021-04-15 NOTE — PROGRESS NOTES
[facility]  Dr. Buck Parkinson. Elly Ross Ms.Ed        Patient Name:  Nohemy Barahona  :  1955     CHIEF C/O:    Chief Complaint   Patient presents with    Other     VERTIGO        HISTORY OBTAINED FROM:  patient, family member - daughter, Mi Lariosole #[id number]    HISTORY OF PRESENT ILLNESS:       Trisha Bolden is a 76y.o. year old female, here today for follow up of dizziness. She was last seen 6 weeks ago and has been in vestibular rehab for the past 4 weeks. She states that symptoms are slowly improving with less episodes of dizziness. She continues to have issues when turning her head to the left or rapidly up and down. She denies any symptoms of nausea. She states that she is having less issues with ambulation. She denies any new symptoms at this time.           Past Medical History:   Diagnosis Date    Hypertension     TB (pulmonary tuberculosis)      Past Surgical History:   Procedure Laterality Date    BRONCHOSCOPY  2016    BRONCHOSCOPY N/A 9/15/2020    BRONCHOSCOPY W/EBUS FNA performed by Chetna Alicea MD at [address] [place] [address]  9/15/2020    BRONCHOSCOPY THERAPUTIC ASPIRATION INITIAL performed by Chetna Alicea MD at [address] [place] [address]  9/15/2020    BRONCHOSCOPY ALVEOLAR LAVAGE performed by Chetna Alicea MD at [address] [place] [address]  9/15/2020    BRONCHOSCOPY ADD ON COMPUTER ASSISTED performed by Chetna Alicea MD at [address] [place] [address]  9/15/2020    BRONCHOSCOPY/TRANSBRONCHIAL NEEDLE BIOPSY performed by Chetna Alicea MD at [address] COLONOSCOPY N/A 2021    COLONOSCOPY POLYPECTOMY SNARE/COLD BIOPSY performed by Jaylon Power MD at [address] [facility]  2014    EXAM       Current Outpatient Medications:     cloNIDine (CATAPRES) 0.1 MG tablet, TAKE 1 TABLET 2 TIMES DAILY AS NEEDED FOR HIGH BLOOD PRESSURE (SYSTOLIC BLOOD PRESSURE > 160), Disp: 60 tablet, Rfl: 0  Patient has no known Thought content normal.         Judgment: Judgment normal.         IMPRESSION/PLAN:  Marley Arias was seen today for other. Diagnoses and all orders for this visit:    BPPV (benign paroxysmal positional vertigo), left      Marley Arias seen today for 6-week follow-up of BPPV following vestibular rehab. After 4 weeks of therapy she is noticing slow improvement of her symptoms with decreased episodes of dizziness. Physical therapy notes reviewed with continued improvement shown by patient. Physical exam is unchanged from previous study. At this time she will continue with physical therapy for any remaining sessions and will follow up in 6 weeks. At that time if patient continues to to have symptoms a VNG and possible MRI will be ordered for further evaluation. She agrees to this plan. She instructed to call with any new or worsening symptoms prior to her next appointment.       JOHANA Real, FNP-C  [address] [place] [address], Nose and Throat      The information contained in this note has been dictated using drug and medical speech recognition software and may contain errors

## 2021-04-19 ENCOUNTER — TREATMENT (OUTPATIENT)
Dept: PHYSICAL THERAPY | Age: 66
End: 2021-04-19
Payer: COMMERCIAL

## 2021-04-19 DIAGNOSIS — H81.12 BPPV (BENIGN PAROXYSMAL POSITIONAL VERTIGO), LEFT: Primary | ICD-10-CM

## 2021-04-19 PROCEDURE — 97112 NEUROMUSCULAR REEDUCATION: CPT

## 2021-04-19 NOTE — PROGRESS NOTES
Physical Therapy Daily Treatment Note    Date: 2021  Patient Name: Kiara Mcqueen  : 1955   MRN: [id number]  [facility]: May 2020  DOSx: -  Referring Provider:  SUSAN Patel - CNP    Medical Diagnosis:    Diagnosis Orders   1. BPPV (benign paroxysmal positional vertigo), left       Outcome Measure:  Lower Extremity Functional Scale (LEFS) 44% impairment    S: pt reports just came from the ENT drEsme And wants her to continue 4 more weeks. O:  Time 800-840     Visit      Pain 0/10     ROM      Manual maneuvers      [place] Hallpike R                      L   negative     positive slight      Epley x 1     Exercise      Nustep   L5/5 min Head movements    Sit/Stands      TUG test 13 sec Min dizziness    Gait training 325 ft unsteady with turns          VOR                        vertical                              horizontal  x 10   x 10 Min dizziness    Saccades  x 10     Smooth Pursuit  x 30 sec     ABCD X 10     Roll to right side then onto back  Left side then back 10x     10x            Min dizziness              Min dizziness    Diagonal ball chops/overhead 10x each direction  10x           Min dizziness    Nose to knee 10x each knee           Min dizziness    Marching Gait      Sidestepping      Gait with head turns f/e                                  Rot                                  LF                                360   2 x 50 feet  2 x 50 feet  2 x 50 feet  2 x 50 ft minimal dizziness  minimal dizziness  minimal dizziness  min dizziness          90* turn step up 10x ea side       Min dizziness                A:  Tolerated well. Increased dizziness when pt turns head to the left. Has Loss of hearing in the right ear.   P: Continue with rehab plan  Saranya Almonte PTA    Treatment Charges: Mins Units   Initial Evaluation     Re-Evaluation     Ther Exercise         TE     Manual Therapy     MT     Ther Activities        TA     Gait Training          GT     Neuro Re-education NR 40 3 Modalities     Non-Billable Service Time     Other     Total Time/Units 40 3

## 2021-04-21 ENCOUNTER — TREATMENT (OUTPATIENT)
Dept: PHYSICAL THERAPY | Age: 66
End: 2021-04-21
Payer: COMMERCIAL

## 2021-04-21 DIAGNOSIS — H81.12 BPPV (BENIGN PAROXYSMAL POSITIONAL VERTIGO), LEFT: Primary | ICD-10-CM

## 2021-04-21 PROCEDURE — 97112 NEUROMUSCULAR REEDUCATION: CPT

## 2021-04-26 ENCOUNTER — TREATMENT (OUTPATIENT)
Dept: PHYSICAL THERAPY | Age: 66
End: 2021-04-26
Payer: COMMERCIAL

## 2021-04-26 DIAGNOSIS — H81.12 BPPV (BENIGN PAROXYSMAL POSITIONAL VERTIGO), LEFT: Primary | ICD-10-CM

## 2021-04-26 PROCEDURE — 97112 NEUROMUSCULAR REEDUCATION: CPT

## 2021-04-26 NOTE — PROGRESS NOTES
Physical Therapy Daily Treatment Note    Date: 2021  Patient Name: Robson García  : 1955   MRN: [id number]  [facility]: May 2020  DOSx: -  Referring Provider:  SUSAN Dominguez CNP    Medical Diagnosis:    Diagnosis Orders   1. BPPV (benign paroxysmal positional vertigo), left       Outcome Measure:  Lower Extremity Functional Scale (LEFS) 44% impairment    S: pt reports just came from the ENT drEsme And wants her to continue 4 more weeks. O:  Time 800-840     Visit      Pain 0/10     ROM      Manual maneuvers      [place] Hallpike R                      L   negative     positive slight      Epley x 1     Exercise      Nustep   L5/  8 min Head movements    Sit/Stands 14/30 sec     TUG test  Min dizziness    Gait training 325 ft unsteady with turns          VOR                        vertical                              horizontal  x 10   x 10 Min dizziness    Saccades  x 10     Smooth Pursuit  x 30 sec     ABCD X 10     Roll to right side then onto back  Left side then back 10x     10x            Min dizziness              Min dizziness    Diagonal ball chops/overhead 15x each direction  15x           Min dizziness    Nose to knee 15x each knee           Min dizziness    Marching Gait      Sidestepping      Gait with head turns f/e                                  Rot                                  LF                                360   2 x 50 feet  2 x 50 feet  2 x 50 feet  2 x 50 ft minimal dizziness  minimal dizziness  minimal dizziness  min dizziness          90* turn step up 15x ea side       Min dizziness                A:  Tolerated well. Increased dizziness when pt turns head to the left. Has Loss of hearing in the right ear.   P: Continue with rehab plan  Cathyann Sit, PTA    Treatment Charges: Mins Units   Initial Evaluation     Re-Evaluation     Ther Exercise         TE     Manual Therapy     MT     Ther Activities        TA     Gait Training          GT     Neuro Re-education NR 40 3

## 2021-04-28 ENCOUNTER — TREATMENT (OUTPATIENT)
Dept: PHYSICAL THERAPY | Age: 66
End: 2021-04-28
Payer: COMMERCIAL

## 2021-04-28 DIAGNOSIS — H81.12 BPPV (BENIGN PAROXYSMAL POSITIONAL VERTIGO), LEFT: Primary | ICD-10-CM

## 2021-04-28 PROCEDURE — 97112 NEUROMUSCULAR REEDUCATION: CPT

## 2021-05-03 ENCOUNTER — TREATMENT (OUTPATIENT)
Dept: PHYSICAL THERAPY | Age: 66
End: 2021-05-03
Payer: COMMERCIAL

## 2021-05-03 DIAGNOSIS — H81.12 BPPV (BENIGN PAROXYSMAL POSITIONAL VERTIGO), LEFT: Primary | ICD-10-CM

## 2021-05-03 PROCEDURE — 97112 NEUROMUSCULAR REEDUCATION: CPT

## 2021-05-03 NOTE — PROGRESS NOTES
Physical Therapy Daily Treatment Note    Date: 5/3/2021  Patient Name: Ysabel Wang  : 1955   MRN: [id number]  [facility]: May 2020  DOSx: -  Referring Provider:  SUSAN Diaz CNP    Medical Diagnosis:    Diagnosis Orders   1. BPPV (benign paroxysmal positional vertigo), left       Outcome Measure:  Lower Extremity Functional Scale (LEFS) 44% impairment    S: pts dtr reports things are improving  O:  Time 800-840     Visit 10/24     Pain 0/10     ROM      Manual maneuvers      [place] Hallpike R                      L   negative     positive slight      Epley x 1     Exercise      Nustep   L5/  8 min Head movements    Sit/Stands 14/30 sec     TUG test  Min dizziness    Gait training 325 ft unsteady with turns          VOR                        vertical                              horizontal  x 10   x 10 Min dizziness    Saccades  x 10     Smooth Pursuit  x 30 sec     ABCD X 10     Roll to right side then onto back  Left side then back 10x     10x            Min dizziness              Min dizziness    Diagonal ball chops/overhead 15x each direction  15x           Min dizziness    Nose to knee 15x each knee           Min dizziness    Marching Gait      Sidestepping      Gait with head turns f/e                                  Rot                                  LF                                360   2 x 50 feet  2 x 50 feet  2 x 50 feet  2 x 50 ft minimal dizziness  minimal dizziness  minimal dizziness  min dizziness          Touch cone 90* turn step up 15x ea side       Min dizziness                A:  Tolerated well. Increased dizziness when pt turns head to the left. Has Loss of hearing in the right ear.   P: Continue with rehab plan  Angeles Snyder PTA    Treatment Charges: Mins Units   Initial Evaluation     Re-Evaluation     Ther Exercise         TE     Manual Therapy     MT     Ther Activities        TA     Gait Training          GT     Neuro Re-education NR 40 3   Modalities     Non-Billable Service Time     Other     Total Time/Units 40 3

## 2021-05-05 ENCOUNTER — TREATMENT (OUTPATIENT)
Dept: PHYSICAL THERAPY | Age: 66
End: 2021-05-05
Payer: COMMERCIAL

## 2021-05-05 DIAGNOSIS — H81.12 BPPV (BENIGN PAROXYSMAL POSITIONAL VERTIGO), LEFT: Primary | ICD-10-CM

## 2021-05-05 PROCEDURE — 97112 NEUROMUSCULAR REEDUCATION: CPT

## 2021-05-05 ASSESSMENT — ENCOUNTER SYMPTOMS
EYES NEGATIVE: 1
RESPIRATORY NEGATIVE: 1
SHORTNESS OF BREATH: 0
STRIDOR: 0

## 2021-05-10 ENCOUNTER — TREATMENT (OUTPATIENT)
Dept: PHYSICAL THERAPY | Age: 66
End: 2021-05-10
Payer: COMMERCIAL

## 2021-05-10 DIAGNOSIS — H81.12 BPPV (BENIGN PAROXYSMAL POSITIONAL VERTIGO), LEFT: Primary | ICD-10-CM

## 2021-05-10 PROCEDURE — 97112 NEUROMUSCULAR REEDUCATION: CPT

## 2021-05-12 ENCOUNTER — TREATMENT (OUTPATIENT)
Dept: PHYSICAL THERAPY | Age: 66
End: 2021-05-12
Payer: COMMERCIAL

## 2021-05-12 DIAGNOSIS — H81.12 BPPV (BENIGN PAROXYSMAL POSITIONAL VERTIGO), LEFT: Primary | ICD-10-CM

## 2021-05-12 PROCEDURE — 97112 NEUROMUSCULAR REEDUCATION: CPT

## 2021-05-12 NOTE — PROGRESS NOTES
Physical Therapy Daily Treatment Note    Date: 2021  Patient Name: Raquel Parra  : 1955   MRN: [id number]  [facility]: May 2020  DOSx: -  Referring Provider:  SUSAN Luna - CNP    Medical Diagnosis:   1. BPPV (benign paroxysmal positional vertigo), left        Outcome Measure:  Lower Extremity Functional Scale (LEFS) 44% impairment    S: pts dtr reports things are improving, able to do things longer with decreased dizziness. O:  Time 800-840     Visit  39/72  53399    Pain 0/10     ROM      Manual maneuvers      [place] Hallpike R                      L      Epley x 1     Exercise      Nustep   L5/  8 min Head movements    Sit/Stands 14/30 sec     TUG test  Min dizziness    Gait training 325 ft unsteady with head turns          VOR                        vertical                              horizontal  x 10   x 10 Min dizziness    Saccades  x 10     Smooth Pursuit  x 30 sec     ABCD X 10    Min dizziness    Roll to right side then onto back  Left side then back 10x     10x   2 sets            Min dizziness              Min/mod dizziness    Diagonal ball chops/overhead 15x each direction  15x           Min dizziness    Nose to knee 15x each knee           Min dizziness    Marching Gait      Sidestepping      Gait with head turns f/e                                  Rot                                  LF                                360   2 x 50 feet  2 x 50 feet  2 x 50 feet  2 x 50 ft minimal dizziness  minimal dizziness  minimal dizziness  min dizziness          Touch cone 90* turn step up 15x ea side       Min dizziness    2 cone touch and turn 10x ea Min dizziness          A:  Tolerated well. Increased dizziness when pt turns head to the left. Has Loss of hearing in the right ear. With all head turns pt has slight dizziness, worse with going to the left. But within a min or two symptoms improve. P: Follow up with ENT.  Pt will continue with STANLEY Duran PTA    Treatment Charges: Mins Units   Initial Evaluation     Re-Evaluation     Ther Exercise         TE     Manual Therapy     MT     Ther Activities        TA     Gait Training          GT     Neuro Re-education NR 40 3   Modalities     Non-Billable Service Time     Other     Total Time/Units 40 3

## 2021-05-20 ENCOUNTER — OFFICE VISIT (OUTPATIENT)
Dept: ENT CLINIC | Age: 66
End: 2021-05-20
Payer: COMMERCIAL

## 2021-05-20 VITALS
DIASTOLIC BLOOD PRESSURE: 80 MMHG | WEIGHT: 129.3 LBS | HEIGHT: 61 IN | BODY MASS INDEX: 24.41 KG/M2 | HEART RATE: 78 BPM | SYSTOLIC BLOOD PRESSURE: 144 MMHG

## 2021-05-20 DIAGNOSIS — H81.12 BPPV (BENIGN PAROXYSMAL POSITIONAL VERTIGO), LEFT: Primary | ICD-10-CM

## 2021-05-20 PROCEDURE — G8427 DOCREV CUR MEDS BY ELIG CLIN: HCPCS | Performed by: NURSE PRACTITIONER

## 2021-05-20 PROCEDURE — 3017F COLORECTAL CA SCREEN DOC REV: CPT | Performed by: NURSE PRACTITIONER

## 2021-05-20 PROCEDURE — 1036F TOBACCO NON-USER: CPT | Performed by: NURSE PRACTITIONER

## 2021-05-20 PROCEDURE — 99213 OFFICE O/P EST LOW 20 MIN: CPT | Performed by: NURSE PRACTITIONER

## 2021-05-20 PROCEDURE — 1123F ACP DISCUSS/DSCN MKR DOCD: CPT | Performed by: NURSE PRACTITIONER

## 2021-05-20 PROCEDURE — G8400 PT W/DXA NO RESULTS DOC: HCPCS | Performed by: NURSE PRACTITIONER

## 2021-05-20 PROCEDURE — 1090F PRES/ABSN URINE INCON ASSESS: CPT | Performed by: NURSE PRACTITIONER

## 2021-05-20 PROCEDURE — G8420 CALC BMI NORM PARAMETERS: HCPCS | Performed by: NURSE PRACTITIONER

## 2021-05-20 PROCEDURE — 4040F PNEUMOC VAC/ADMIN/RCVD: CPT | Performed by: NURSE PRACTITIONER

## 2021-05-20 RX ORDER — MECLIZINE HCL 12.5 MG/1
12.5 TABLET ORAL DAILY PRN
Qty: 20 TABLET | Refills: 1 | Status: SHIPPED
Start: 2021-05-20 | End: 2021-08-26 | Stop reason: SDUPTHER

## 2021-05-20 NOTE — PROGRESS NOTES
[facility] Otolaryngology  Dr. Damien Olivares. Eduard Sandhu. Ms.Ed        Patient Name:  Shannon Farrar  :  1955     CHIEF C/O:    Chief Complaint   Patient presents with    Dizziness     follow up-daughter states has improved        HISTORY OBTAINED FROM:  patient    HISTORY OF PRESENT ILLNESS:       Abhinav Cuellar is a 76y.o. year old female, here today for follow up of dizziness. Was last seen 2 months ago  Finished all sessions of vestibular rehab  States she is experienced much improvement  States she now has days with no symptoms  Symptoms still aggravated with rapid head movements. Denies any nausea with symptoms. Has no new complaints          Past Medical History:   Diagnosis Date    Hypertension     TB (pulmonary tuberculosis)      Past Surgical History:   Procedure Laterality Date    BRONCHOSCOPY  2016    BRONCHOSCOPY N/A 9/15/2020    BRONCHOSCOPY W/EBUS FNA performed by Cesar Galindo MD at [address] [facility]  9/15/2020    BRONCHOSCOPY THERAPUTIC ASPIRATION INITIAL performed by Cesar Galindo MD at [address] [place] [address]  9/15/2020    BRONCHOSCOPY ALVEOLAR LAVAGE performed by Cesar Galindo MD at [address] [facility]  9/15/2020    BRONCHOSCOPY ADD ON COMPUTER ASSISTED performed by Cesar Galindo MD at [address] [facility]  9/15/2020    BRONCHOSCOPY/TRANSBRONCHIAL NEEDLE BIOPSY performed by Cesar Galindo MD at [address] COLONOSCOPY N/A 2021    COLONOSCOPY POLYPECTOMY SNARE/COLD BIOPSY performed by Ny García MD at [address] [facility]  2014    EXAM       Current Outpatient Medications:     cloNIDine (CATAPRES) 0.1 MG tablet, TAKE 1 TABLET 2 TIMES DAILY AS NEEDED FOR HIGH BLOOD PRESSURE (SYSTOLIC BLOOD PRESSURE > 160), Disp: 60 tablet, Rfl: 0  Patient has no known allergies.   Social History     Tobacco Use    Smoking status: Never Smoker    Smokeless tobacco: Never Used   Vaping Use    Vaping Use: Never assessed   Substance Use Topics    Alcohol use: No    Drug use: No     Family History   Problem Relation Age of Onset    Diabetes Mother     Diabetes Father        Review of Systems   Constitutional: Negative. Negative for activity change and appetite change. HENT: Negative. Negative for congestion, ear discharge, ear pain, rhinorrhea and sinus pain. Eyes: Negative. Respiratory: Negative. Negative for shortness of breath and stridor. Cardiovascular: Negative. Negative for chest pain and palpitations. Endocrine: Negative. Genitourinary: Negative. Musculoskeletal: Negative. Skin: Negative. Neurological: Negative. Negative for dizziness. Hematological: Negative. Psychiatric/Behavioral: Negative. BP (!) 144/80 (Site: Left Upper Arm, Position: Sitting, Cuff Size: Medium Adult)   Pulse 78   Ht 5' 1\" (1.549 m)   Wt 129 lb 4.8 oz (58.7 kg)   BMI 24.43 kg/m²   Physical Exam  Constitutional:       Appearance: Normal appearance. HENT:      Head: Normocephalic. Right Ear: Tympanic membrane, ear canal and external ear normal.      Left Ear: Tympanic membrane, ear canal and external ear normal.      Nose: Nose normal.      Mouth/Throat:      Lips: Pink. Mouth: Mucous membranes are moist.   Eyes:      Conjunctiva/sclera: Conjunctivae normal.      Pupils: Pupils are equal, round, and reactive to light. Cardiovascular:      Rate and Rhythm: Normal rate and regular rhythm. Pulses: Normal pulses. Pulmonary:      Effort: Pulmonary effort is normal. No respiratory distress. Breath sounds: No stridor. Musculoskeletal:         General: Normal range of motion. Cervical back: Normal range of motion. No rigidity. No muscular tenderness. Skin:     General: Skin is warm and dry. Neurological:      General: No focal deficit present. Mental Status: She is alert and oriented to person, place, and time.    Psychiatric:         Mood and Affect: Mood normal. Behavior: Behavior normal.         Thought Content: Thought content normal.         Judgment: Judgment normal.         IMPRESSION/PLAN:    Tiana Stroud was seen today for dizziness. Diagnoses and all orders for this visit:    BPPV (benign paroxysmal positional vertigo), left    Other orders  -     meclizine (ANTIVERT) 12.5 MG tablet; Take 1 tablet by mouth daily as needed for Dizziness      Patient has responded well to physical therapy. Patient requests prescription for meclizine as she will be soon traveling to [place] and is afraid of symptoms returning. Prescription is provided for meclizine, 12.5 mg, 1 tablet daily as needed for symptoms. She will follow-up in 3 months. Is discussed with any return of symptoms may repeat physical therapy and consider possible VNG in the future. She is instructed to call with any new or worsening of symptoms prior to her next appointment.       Radha Keller, JOHANA, FNP-C  8 [facility], Nose and Throat    The information contained in this note has been dictated using drug and medical speech recognition software and may contain errors

## 2021-06-25 ASSESSMENT — ENCOUNTER SYMPTOMS
SHORTNESS OF BREATH: 0
EYES NEGATIVE: 1
SINUS PAIN: 0
RESPIRATORY NEGATIVE: 1
RHINORRHEA: 0
STRIDOR: 0

## 2021-08-02 ENCOUNTER — OFFICE VISIT (OUTPATIENT)
Dept: PULMONOLOGY | Age: 66
End: 2021-08-02
Payer: COMMERCIAL

## 2021-08-02 VITALS
WEIGHT: 128 LBS | SYSTOLIC BLOOD PRESSURE: 131 MMHG | HEIGHT: 63 IN | HEART RATE: 69 BPM | TEMPERATURE: 98 F | DIASTOLIC BLOOD PRESSURE: 65 MMHG | RESPIRATION RATE: 18 BRPM | OXYGEN SATURATION: 96 % | BODY MASS INDEX: 22.68 KG/M2

## 2021-08-02 DIAGNOSIS — R91.1 LUNG NODULE: Primary | ICD-10-CM

## 2021-08-02 PROCEDURE — 99213 OFFICE O/P EST LOW 20 MIN: CPT | Performed by: INTERNAL MEDICINE

## 2021-08-02 NOTE — PROGRESS NOTES
10 mos follow up today in office; CT from March reviewed. Plan for follow up in office in 1 year; appt card given. Follow up chest CT to be scheduled.

## 2021-08-02 NOTE — PROGRESS NOTES
Department of Internal Medicine  Division of Pulmonary, Critical Care & Sleep Medicine  Pulmonary [facility]                                             Pulmonary Clinic Consult     I had the pleasure of seeing  Jerrod De Jesus in the [address] regarding their history of asbestosis   Follow up lung nodule  HISTORY OF PRESENT ILLNESS:    Jerrod De Jesus is a 76y.o. year old  Who never smoke or seconds smoke around her     The Patient comes in with SOB that has been going on the last few years and she had work up 2 years ago for TB and asbestsos and she has chronic     She  has cough ,productive for clear  Sputum and it is more in the     She had  has + PPD,abnormal CT/CXR with cough. I. The patients' cough has not been productive of any sputum or blood. She has had no fever noted. She has had no associated  sore throat, HA, ear discomfort. . She does not smoke tobacco. She has been in the [place] for quite some time, she has had no recent travel. Her family member is concerned as her cough has been persistent and brings her to [facility] for order for an outpatient CXR. The CXR was reported as abnormal and thus a CT scan was performed. The CT scan was reviewed and show pleural plaque without adenopathy. Using a  we note that she has not TB exposure or history. She from [place]. On questioning she was a  for 30 + years with mainly soft metals. Her  however is a  in [place] for 40 + years and has significant exposure to asbestos exposure. 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 She has had the cough for 10 years.  No GERD    She has CT scan shows multiple plaques and also bronchoscopy done with bacterial growth ,negative AFB     Today visit  She came for follow up Ct as she ahd CT in 8 /2020 and shows increase left lung GGO still stable after 8 months follow  is daughter and she agreed that her daughter translate   Daughter speak Lorenda Soda well  No chest pain  No hemoptysis  No weight loss \  No fever ,no sweating       ALLERGIES:  No Known Allergies    PAST MEDICAL HISTORY:       Diagnosis Date    Hypertension     TB (pulmonary tuberculosis)        MEDICATIONS:   Current Outpatient Medications   Medication Sig Dispense Refill    meclizine (ANTIVERT) 12.5 MG tablet Take 1 tablet by mouth daily as needed for Dizziness 20 tablet 1    cloNIDine (CATAPRES) 0.1 MG tablet TAKE 1 TABLET 2 TIMES DAILY AS NEEDED FOR HIGH BLOOD PRESSURE (SYSTOLIC BLOOD PRESSURE > 160) 60 tablet 0     No current facility-administered medications for this visit. SOCIAL AND OCCUPATIONAL HEALTH:  Social History     Tobacco Use   Smoking Status Never Smoker   Smokeless Tobacco Never Used     TB :  Pets   Industrial exposure:  Birds :    SURGICAL HISTORY:   Past Surgical History:   Procedure Laterality Date    BRONCHOSCOPY  12/14/2016    BRONCHOSCOPY N/A 9/15/2020    BRONCHOSCOPY W/EBUS FNA performed by Ena Diaz MD at [address] [place] [address]  9/15/2020    BRONCHOSCOPY THERAPUTIC ASPIRATION INITIAL performed by Ena Diaz MD at [address] [place] [address]  9/15/2020    BRONCHOSCOPY ALVEOLAR LAVAGE performed by Ena Diaz MD at [address] [place] [address]  9/15/2020    BRONCHOSCOPY ADD ON COMPUTER ASSISTED performed by Ena Diaz MD at [address] [place] [address]  9/15/2020    BRONCHOSCOPY/TRANSBRONCHIAL NEEDLE BIOPSY performed by Ena Diaz MD at [address] COLONOSCOPY N/A 1/26/2021    COLONOSCOPY POLYPECTOMY SNARE/COLD BIOPSY performed by Atul Peacock MD at [address] [facility]  7/23/2014    EXAM       FAMILY HISTORY:   Lung cancer:NO   DVT or PE No     REVIEW OF SYSTEMS:  Constitutional: General health is good . There has been no weight changes. No fevers, fatigue or weakness.    Head: Patient denies any history of trauma, convulsive disorder or syncope. Skin:  Patient denies history of changes in pigmentation, eruptions or pruritus. No easy bruising or bleeding. EENT: no nasal congestion   Cardiovascular ,No chest pain ,No edema ,  Respiration:SOB:+  ,KHAN :+  Gastrointestinal:No GI bleed ,no melena  ,no hematemesis    Musculoskeletal: no joint pain ,no swelling  Neurological:no , syncope. Denies twitching, convulsions, loss of consciousness or memory. Endocrine:  . No history of goiter, exophthalmos or dryness of skin. The patient has no history of diabetes. Hematopoietic:  No history of bleeding disorders or easy bruising. Rheumatic:  No connective tissue disease history or polyarthritis/inflammatory joint disease. PHYSICAL EXAMINATION:  Vitals:    08/02/21 1142   BP: 131/65   Pulse: 69   Resp: 18   Temp: 98 °F (36.7 °C)   SpO2: 96%     Constitutional: This is a well developed, well nourished 76y.o. year old female who is alert, oriented, cooperative and in no apparent distress. Head was normocephalic and atraumatic. EENT: Mallampati class :  Extraocular muscles intact. External canals are patent and no discharge was appreciated. Septum was midline,   mucosa was without erythema, exudates or cobblestoning. No thrush was noted. Neck: Supple without thyromegaly. No elevated JVP. Trachea was midline. No carotid bruits were auscultated. Respiratory: rales   Cardiovascular: Regular without murmur, clicks, gallops or rubs. There is no left or right ventricular heave. Pulses:  Carotid, radial and femoral pulses were equally bilaterally. Abdomen: Slightly rounded and soft without organomegaly. No rebound, rigidity or guarding was appreciated. Lymphatic: No lymphadenopathy. Musculoskeletal: no joint deformity     Extremities:no edema   Skin:  Warm and dry. Good color, turgor and pigmentation. No lesions or scars.   Neurological/Psychiatric: The patient's general behavior, level of consciousness, thought content and emotional status is normal.  Cranial nerves II-XII are intact. DATA:                           IMPRESSION:    1-Asbestosis   2-multpie nodules    3-calcified plaques   4-Positive PPD             PLAN:      Patient GGO left lung has been stable  All other lung nodules are stable ,after 8 months   Biopsy and culture were negative for TB and cancer   Since lung nodule stable . will hold on re do biopsy   -T spot test is + ,seen by ID   _Eosinophilic ,.Sed rate ,IgE and see her in 4 months  All came normal   _ call infectious disease to see if any follow up     Daughter to call and let us  Know if any issues    Thank you for allowing me to participate in [facility]. I will keep following with you ,should you have any concerns ,please contact me at [phone]     Sincerely,        Jeison Alvarez MD  Pulmonary & Critical Care Medicine     NOTE: This report was transcribed using voice recognition software. Every effort was made to ensure accuracy; however, inadvertent computerized transcription errors may be present.

## 2021-08-26 ENCOUNTER — OFFICE VISIT (OUTPATIENT)
Dept: ENT CLINIC | Age: 66
End: 2021-08-26
Payer: COMMERCIAL

## 2021-08-26 VITALS
HEIGHT: 63 IN | BODY MASS INDEX: 23.21 KG/M2 | HEART RATE: 82 BPM | WEIGHT: 131 LBS | DIASTOLIC BLOOD PRESSURE: 83 MMHG | SYSTOLIC BLOOD PRESSURE: 145 MMHG

## 2021-08-26 DIAGNOSIS — I10 ESSENTIAL HYPERTENSION: ICD-10-CM

## 2021-08-26 DIAGNOSIS — H81.12 BPPV (BENIGN PAROXYSMAL POSITIONAL VERTIGO), LEFT: ICD-10-CM

## 2021-08-26 DIAGNOSIS — R42 VERTIGO: Primary | ICD-10-CM

## 2021-08-26 PROCEDURE — G8420 CALC BMI NORM PARAMETERS: HCPCS | Performed by: NURSE PRACTITIONER

## 2021-08-26 PROCEDURE — G8427 DOCREV CUR MEDS BY ELIG CLIN: HCPCS | Performed by: NURSE PRACTITIONER

## 2021-08-26 PROCEDURE — G8400 PT W/DXA NO RESULTS DOC: HCPCS | Performed by: NURSE PRACTITIONER

## 2021-08-26 PROCEDURE — 1123F ACP DISCUSS/DSCN MKR DOCD: CPT | Performed by: NURSE PRACTITIONER

## 2021-08-26 PROCEDURE — 4040F PNEUMOC VAC/ADMIN/RCVD: CPT | Performed by: NURSE PRACTITIONER

## 2021-08-26 PROCEDURE — 1090F PRES/ABSN URINE INCON ASSESS: CPT | Performed by: NURSE PRACTITIONER

## 2021-08-26 PROCEDURE — 3017F COLORECTAL CA SCREEN DOC REV: CPT | Performed by: NURSE PRACTITIONER

## 2021-08-26 PROCEDURE — 1036F TOBACCO NON-USER: CPT | Performed by: NURSE PRACTITIONER

## 2021-08-26 PROCEDURE — 99214 OFFICE O/P EST MOD 30 MIN: CPT | Performed by: NURSE PRACTITIONER

## 2021-08-26 RX ORDER — CLONIDINE HYDROCHLORIDE 0.1 MG/1
TABLET ORAL
Qty: 60 TABLET | Refills: 0 | OUTPATIENT
Start: 2021-08-26

## 2021-08-26 RX ORDER — AMLODIPINE BESYLATE 2.5 MG/1
TABLET ORAL
COMMUNITY
Start: 2021-07-26 | End: 2021-09-07 | Stop reason: SDUPTHER

## 2021-08-26 RX ORDER — MECLIZINE HCL 12.5 MG/1
12.5 TABLET ORAL DAILY PRN
Qty: 20 TABLET | Refills: 1 | Status: SHIPPED | OUTPATIENT
Start: 2021-08-26

## 2021-08-26 NOTE — TELEPHONE ENCOUNTER
Patient has not been seen in this office in almost 2 years; please schedule appointment for appropriate care before refill will be given. Thank you!     Electronically signed by Romana Dunning, DO on 8/26/2021 at 9:58 AM

## 2021-08-26 NOTE — PROGRESS NOTES
[facility]  Dr. uHnter Weaver. Felicita Fernandez. Ms.Ed        Patient Name:  Yefri Torres  :  1955     CHIEF C/O:    Chief Complaint   Patient presents with    Dizziness     3 month follow up       HISTORY OBTAINED FROM:  patient, family member - Samir Lozada #[id number]    HISTORY OF PRESENT ILLNESS:       Clayton Kruger is a 78y.o. year old female, here today for follow up of dizziness.     Last seen 3 months ago  Continues to have symptoms  Worse with rapid head movements   Continues to have episodes throughout the day  Ambulation has improved  No headaches or blurred vision  Completed vestibular rehab which did improve symptoms but still suffering from dizziness  No changes to her hearing  No sinus pain or pressure  Was taking Meclizine for symptoms but is out, did provide some relief    Past Medical History:   Diagnosis Date    Hypertension     TB (pulmonary tuberculosis)      Past Surgical History:   Procedure Laterality Date    BRONCHOSCOPY  2016    BRONCHOSCOPY N/A 9/15/2020    BRONCHOSCOPY W/EBUS FNA performed by Miranda Malin MD at [address] [place] [address]  9/15/2020    BRONCHOSCOPY THERAPUTIC ASPIRATION INITIAL performed by Miranda Malin MD at [address] [place] [address]  9/15/2020    BRONCHOSCOPY ALVEOLAR LAVAGE performed by Miranda Malin MD at [address] [place] [address]  9/15/2020    BRONCHOSCOPY ADD ON COMPUTER ASSISTED performed by Miranda Malin MD at [address] [place] [address]  9/15/2020    BRONCHOSCOPY/TRANSBRONCHIAL NEEDLE BIOPSY performed by Miranda Malin MD at [address] COLONOSCOPY N/A 2021    COLONOSCOPY POLYPECTOMY SNARE/COLD BIOPSY performed by Rancho Cox MD at [address] [facility]  2014    EXAM       Current Outpatient Medications:     amLODIPine (NORVASC) 2.5 MG tablet, TAKE 1 TABLET BY MOUTH EVERY DAY, Disp: , Rfl:     meclizine (ANTIVERT) 12.5 MG tablet, Take 1 tablet by mouth daily as needed for Dizziness, Disp: 20 tablet, Rfl: 1    cloNIDine (CATAPRES) 0.1 MG tablet, TAKE 1 TABLET 2 TIMES DAILY AS NEEDED FOR HIGH BLOOD PRESSURE (SYSTOLIC BLOOD PRESSURE > 160), Disp: 60 tablet, Rfl: 0  Patient has no known allergies. Social History     Tobacco Use    Smoking status: Never Smoker    Smokeless tobacco: Never Used   Vaping Use    Vaping Use: Never assessed   Substance Use Topics    Alcohol use: No    Drug use: No     Family History   Problem Relation Age of Onset    Diabetes Mother     Diabetes Father        Review of Systems   Constitutional: Negative. Negative for activity change and appetite change. HENT: Negative. Negative for congestion, ear discharge, ear pain, rhinorrhea and sinus pain. Eyes: Negative. Respiratory: Negative. Negative for shortness of breath and stridor. Cardiovascular: Negative. Negative for chest pain and palpitations. Endocrine: Negative. Genitourinary: Negative. Musculoskeletal: Negative. Skin: Negative. Neurological: Positive for dizziness. Hematological: Negative. Psychiatric/Behavioral: Negative. BP (!) 145/83 (Site: Right Upper Arm, Position: Sitting, Cuff Size: Medium Adult)   Pulse 82   Ht 5' 3\" (1.6 m)   Wt 131 lb (59.4 kg)   BMI 23.21 kg/m²   Physical Exam  Constitutional:       Appearance: Normal appearance. HENT:      Head: Normocephalic. Right Ear: Tympanic membrane, ear canal and external ear normal.      Left Ear: Tympanic membrane, ear canal and external ear normal.      Nose: Nose normal. No rhinorrhea. Right Turbinates: Not swollen or pale. Left Turbinates: Not swollen or pale. Mouth/Throat:      Lips: Pink. Mouth: Mucous membranes are moist.      Pharynx: Oropharynx is clear. Eyes:      Conjunctiva/sclera: Conjunctivae normal.      Pupils: Pupils are equal, round, and reactive to light. Cardiovascular:      Rate and Rhythm: Normal rate and regular rhythm.       Pulses: Normal pulses. Pulmonary:      Effort: Pulmonary effort is normal. No respiratory distress. Breath sounds: No stridor. Musculoskeletal:         General: Normal range of motion. Cervical back: Normal range of motion. No rigidity. No muscular tenderness. Skin:     General: Skin is warm and dry. Neurological:      General: No focal deficit present. Mental Status: She is alert and oriented to person, place, and time. Psychiatric:         Mood and Affect: Mood normal.         Behavior: Behavior normal.         Thought Content: Thought content normal.         Judgment: Judgment normal.         IMPRESSION/PLAN:    Lilian Collet was seen today for dizziness. Diagnoses and all orders for this visit:    Vertigo  -     Electronystagmography    BPPV (benign paroxysmal positional vertigo), left  -     Electronystagmography    Other orders  -     meclizine (ANTIVERT) 12.5 MG tablet; Take 1 tablet by mouth daily as needed for Dizziness      Due to her continued symptoms despite treatment at this time an order will be placed for a VNG to assess vestibular function. Refill is sent for meclizine, 12.5 mg 1 tablet once daily as needed for dizziness. She will follow up in 6 to 8 weeks following her VNG for results. She is instructed to call with any new or worsening symptoms prior to her next.         Dom Soni, MSN, FNP-C  8 [facility], Nose and Throat    The information contained in this note has been dictated using drug and medical speech recognition software and may contain errors

## 2021-08-26 NOTE — TELEPHONE ENCOUNTER
Last Appointment:  Visit date not found  Future Appointments   Date Time Provider García Fowler   10/6/2021  9:30 AM [address]   10/14/2021  7:45 AM SUSAN Cross CNP [facility]   8/4/2022  9:00 AM [facility]                  ----- Message from Howard Lopez sent at 8/26/2021  8:23 AM EDT -----  Subject: Medication Problem    QUESTIONS  Name of Medication? cloNIDine (CATAPRES) 0.1 MG tablet  Patient-reported dosage and instructions? As needed  What question or problem do you have with the medication? The patient   needs a refill and was wondering whether she would need an appt or if the   provider could just call it in. Preferred Pharmacy? [id number], [address] [place] [address] phone number (if available)? [phone]  Additional Information for Provider?   ---------------------------------------------------------------------------  --------------  [address]  What is the best way for the office to contact you? OK to leave message on   voicemail  Preferred Call Back Phone Number? [phone]  ---------------------------------------------------------------------------  --------------  SCRIPT ANSWERS  Relationship to Patient? Other  Representative Name? Ramy Guaman  Is the Representative on the appropriate HIPAA document in Epic?  Yes

## 2021-08-26 NOTE — PROGRESS NOTES
AUDIOLOGY SERVICES   VESTIBULAR TESTING INSTRUCTIONS  Appointment on Wed Oct 6th  at 9:30  ARRIVE at 9:00 to register in the [address] [place] [address] Jimmy: [phone]  Your doctor has ordered a Vestibular Test to evaluate your balance system. Several separate parts may be performed and all are simple and not unpleasant. The balance system of the ear and the brain are evaluated by recording eye movement in response to changes in head position, view moving objects, motion of the body, and thermal stimuli (warm and cold) applied to the ear canal. Vestibular testing is about 1-1 ½ hours in duration. PATIENT RESPONSIBILITY:   1. Try to get a full nights sleep before the test.   2. Remove contact lenses before the exam. Inform the audiologist of any vision problems or lens implants. 3. Wear comfortable clothing and flat or low-heeled shoes. 4. Eat a light meal 2 to 4 hours before the test.   5. DO NOT wear eye makeup or mascara. 6. Avoid beverages containing caffeine (coffee, tea or soft drinks) the day of the test.   7. Please bring a list of your current medications. 8. Please continue to take any medications which are necessary to your long-term and immediate health. Any heart, blood pressure, blood thinning, diabetes or epilepsy medications should be taken as usual.   FOR 48 HOURS PRIOR TO THE TEST, DO NOT TAKE:   1. Anti-dizziness medications: Meclizine, Antivert, Dramamine, Bonine, Phenergan, Compazine, Transderm Scopolamine. 2. Alcohol:  beer, whiskey, clear alcohols, rosa elena and liquid medicines containing alcohol (cough syrup)   We would prefer that you not take the following medications. If any cannot be discontinued, please let the audiologist know.    1. Tranquilizers: Valium, Diazepam, Librium, Elavil, Triavil, Klonopin, Xanax, Ativan, etc.   2. Sleeping Pills: Seconal, Ambien, Nembutal, etc.   3. Antidepressants: Paxil, Prozac, Zoloft, etc. 4. Pain Medications: Codeine, Demerol, Percodan, Hydrocodone, etc.   5. Antihistamine/ Decongestant: Dimetapp, Benadryl, Sudafed, Allegra, Claritin, etc.   Medication can be resumed immediately following VNG evaluation. YOUR COOPERATION WITH THESE INSTRUCTIONS WILL IMPROVE THE QUALITY OF YOUR EXAMINATION. IF YOU ENCOUNTER ANY PROBLEMS FOLLOWING THESE INSTRUCTIONS, OR FOR ANY OTHER QUESTIONS PERTAINING TO THIS TEST PROCEDURE, Monica Petersen [address]. [facility] at [phone] or [facility] at [phone]. You may wish to check for testing coverage with your insurance.  Below are the codes that will be billed following testing.  80474 Caloric vestibular test with 4 irrigations   59198 ENG Complete test

## 2021-09-02 ASSESSMENT — ENCOUNTER SYMPTOMS
SINUS PAIN: 0
RESPIRATORY NEGATIVE: 1
RHINORRHEA: 0
SHORTNESS OF BREATH: 0
STRIDOR: 0
EYES NEGATIVE: 1

## 2021-09-05 NOTE — PROGRESS NOTES
[facility]      SUBJECTIVE:  Lori Larsen (:  1955) is a 78 y.o. female here for evaluation of the following chief complaint(s):  Hypertension (bp have been in the 160\69), Dizziness, and Other (patient wants ct due to constant dizziness)    Lori Larsen is a 78 y.o. with PMH of dizziness on meclizine 12.5 mg daily, restrictive lung disease who presented to [facility] clinic for BP meds refill. Patient was last seen in our clinic 2020. Patient ran out of BP med for a month. Prior to this, home SBP was ~130 with amlodipine 2.5 mg and clonidine PRN. Tolerating well. /76 in office today. Denies headache, visual change, chest pain, SOB, LE swelling. Will increase amlodipine to 5 mg daily. Discuss with patient and daughter on the change of amlodipine dose and they are agreeable with the change. Instruct patient to continue to monitor BP at home. Patient's dizziness improved with vestibular rehab and meclizine. However, she still experience intermittent dizziness with rapid head movement. She has intermittent right sided tinnitus and hearing loss as well since 2020. She has numbness of neck and left arm and hand. Denies neck pain, focal weakness, headache, visual change, difficulty swallow, speech difficulty, or facial droop. She is schedule for Electronystagmography and videonystagmography in 10/2021. Will follow the results and consider MRI imaging depending on the results. Discuss the plan with patient and her daughter and they are agreeable with the plan. HTN  · Was on amlodipine 2.5 mg. Clonidine 0.1 mg BID PRN initiated on 2020  · Increase amlodipine to 5 mg today. Asbestosis   · CT chest 3/2021 shows pleural plaques without adenopathy, stable  · Cough productive for clear suptum.  No chest pain, hemoptysis, weight loss, fever, night sweat  · Biopsy and culture -ve for TB and cancer  · Follow Dr. Asif Burger    Dizziness · Electronystagmography and videonystagmography  · On meclizine 12.5 mg daily PRN. Did provide some relief  · Worse with rapid head movements. Right sided hearing loss. No headaches, blurred vision, sinus pain or pressure, nausea, congestion, rhinorrhea, postnasal drainage  · Completed vestibular rehab  · Follow ENT    HM   · Colonoscopy with polypectomy in 1/2021. Repeat in 5 years. · Mammogram 9/2019 -ve. · Yearly pap smear. Hysterectomy in for DIONY III. Last pap smear 2018. Review of Systems   Constitutional: Negative for chills, fever and unexpected weight change. HENT: Negative for sore throat. Respiratory: Negative for cough and shortness of breath. Cardiovascular: Negative for chest pain and leg swelling. Gastrointestinal: Negative for abdominal pain, blood in stool, constipation, diarrhea, nausea and vomiting. Genitourinary: Negative for dysuria and hematuria. Musculoskeletal: Negative for arthralgias, back pain and neck pain. Skin: Negative for color change and wound. Neurological: Positive for dizziness and numbness. Negative for syncope, facial asymmetry, speech difficulty, weakness and headaches. Current Outpatient Medications on File Prior to Visit   Medication Sig Dispense Refill    meclizine (ANTIVERT) 12.5 MG tablet Take 1 tablet by mouth daily as needed for Dizziness 20 tablet 1     No current facility-administered medications on file prior to visit. OBJECTIVE:    VS:   Vitals:    09/07/21 0824 09/07/21 0826   BP: (!) 150/72 (!) 153/76   Site: Right Upper Arm Right Upper Arm   Position: Sitting Sitting   Cuff Size: Medium Adult Medium Adult   Pulse: 79 80   Resp: 20    Temp: 99.1 °F (37.3 °C)    TempSrc: Oral    Weight: 128 lb 14.4 oz (58.5 kg)    Height: 5' 3\" (1.6 m)      Physical Exam  Vitals reviewed. Constitutional:       General: She is not in acute distress. Appearance: Normal appearance. She is not ill-appearing.    HENT:      Head:

## 2021-09-07 ENCOUNTER — OFFICE VISIT (OUTPATIENT)
Dept: INTERNAL MEDICINE | Age: 66
End: 2021-09-07
Payer: COMMERCIAL

## 2021-09-07 VITALS
TEMPERATURE: 99.1 F | HEIGHT: 63 IN | BODY MASS INDEX: 22.84 KG/M2 | HEART RATE: 80 BPM | WEIGHT: 128.9 LBS | SYSTOLIC BLOOD PRESSURE: 153 MMHG | DIASTOLIC BLOOD PRESSURE: 76 MMHG | RESPIRATION RATE: 20 BRPM

## 2021-09-07 DIAGNOSIS — R91.1 LUNG NODULE: ICD-10-CM

## 2021-09-07 DIAGNOSIS — I10 ESSENTIAL HYPERTENSION: ICD-10-CM

## 2021-09-07 DIAGNOSIS — J92.0 PLEURAL PLAQUE DUE TO ASBESTOS EXPOSURE: ICD-10-CM

## 2021-09-07 DIAGNOSIS — H81.12 BPPV (BENIGN PAROXYSMAL POSITIONAL VERTIGO), LEFT: Primary | ICD-10-CM

## 2021-09-07 DIAGNOSIS — R76.11 POSITIVE TB TEST: ICD-10-CM

## 2021-09-07 DIAGNOSIS — R42 DIZZINESS: ICD-10-CM

## 2021-09-07 PROBLEM — H61.22 IMPACTED CERUMEN OF LEFT EAR: Status: RESOLVED | Noted: 2020-09-18 | Resolved: 2021-09-07

## 2021-09-07 PROCEDURE — G8420 CALC BMI NORM PARAMETERS: HCPCS | Performed by: INTERNAL MEDICINE

## 2021-09-07 PROCEDURE — G8427 DOCREV CUR MEDS BY ELIG CLIN: HCPCS | Performed by: INTERNAL MEDICINE

## 2021-09-07 PROCEDURE — 1090F PRES/ABSN URINE INCON ASSESS: CPT | Performed by: INTERNAL MEDICINE

## 2021-09-07 PROCEDURE — 4040F PNEUMOC VAC/ADMIN/RCVD: CPT | Performed by: INTERNAL MEDICINE

## 2021-09-07 PROCEDURE — 99212 OFFICE O/P EST SF 10 MIN: CPT | Performed by: INTERNAL MEDICINE

## 2021-09-07 PROCEDURE — 1036F TOBACCO NON-USER: CPT | Performed by: INTERNAL MEDICINE

## 2021-09-07 PROCEDURE — 99213 OFFICE O/P EST LOW 20 MIN: CPT | Performed by: INTERNAL MEDICINE

## 2021-09-07 PROCEDURE — 1123F ACP DISCUSS/DSCN MKR DOCD: CPT | Performed by: INTERNAL MEDICINE

## 2021-09-07 PROCEDURE — G8400 PT W/DXA NO RESULTS DOC: HCPCS | Performed by: INTERNAL MEDICINE

## 2021-09-07 PROCEDURE — 3017F COLORECTAL CA SCREEN DOC REV: CPT | Performed by: INTERNAL MEDICINE

## 2021-09-07 RX ORDER — AMLODIPINE BESYLATE 5 MG/1
TABLET ORAL
Qty: 90 TABLET | Refills: 1 | Status: SHIPPED
Start: 2021-09-07 | End: 2021-11-23 | Stop reason: SDUPTHER

## 2021-09-07 RX ORDER — CLONIDINE HYDROCHLORIDE 0.1 MG/1
TABLET ORAL
Qty: 60 TABLET | Refills: 0 | Status: SHIPPED
Start: 2021-09-07 | End: 2022-03-08 | Stop reason: ALTCHOICE

## 2021-09-07 SDOH — ECONOMIC STABILITY: FOOD INSECURITY: WITHIN THE PAST 12 MONTHS, YOU WORRIED THAT YOUR FOOD WOULD RUN OUT BEFORE YOU GOT MONEY TO BUY MORE.: NEVER TRUE

## 2021-09-07 SDOH — ECONOMIC STABILITY: FOOD INSECURITY: WITHIN THE PAST 12 MONTHS, THE FOOD YOU BOUGHT JUST DIDN'T LAST AND YOU DIDN'T HAVE MONEY TO GET MORE.: NEVER TRUE

## 2021-09-07 ASSESSMENT — PATIENT HEALTH QUESTIONNAIRE - PHQ9
SUM OF ALL RESPONSES TO PHQ QUESTIONS 1-9: 0
2. FEELING DOWN, DEPRESSED OR HOPELESS: 0
SUM OF ALL RESPONSES TO PHQ9 QUESTIONS 1 & 2: 0
1. LITTLE INTEREST OR PLEASURE IN DOING THINGS: 0
SUM OF ALL RESPONSES TO PHQ QUESTIONS 1-9: 0
SUM OF ALL RESPONSES TO PHQ QUESTIONS 1-9: 0

## 2021-09-07 ASSESSMENT — ENCOUNTER SYMPTOMS
CONSTIPATION: 0
BACK PAIN: 0
SORE THROAT: 0
NAUSEA: 0
BLOOD IN STOOL: 0
VOMITING: 0
SHORTNESS OF BREATH: 0
ABDOMINAL PAIN: 0
DIARRHEA: 0
COLOR CHANGE: 0
COUGH: 0

## 2021-09-07 ASSESSMENT — SOCIAL DETERMINANTS OF HEALTH (SDOH): HOW HARD IS IT FOR YOU TO PAY FOR THE VERY BASICS LIKE FOOD, HOUSING, MEDICAL CARE, AND HEATING?: NOT HARD AT ALL

## 2021-09-07 NOTE — PROGRESS NOTES
[facility]    Attending Physician Statement  I have discussed the case, including pertinent history and exam findings with the resident physician. I agree with the assessment, plan and orders as documented by the resident. I have reviewed all pertinent PMHx, PSHx, FamHx, SocialHx, medications, and allergies and updated history as appropriate. Patient here for routine follow up of medical problems. 1. HTN: uncontrolled 2/2 running out of medications; refill medications today; when taking amlodipine 2.5 mg; SBP running in the high 130s; will increase amlodpine to 5 mg daily; refill clonidine prn; no secondary symptoms  2. Dizziness: 1 year duration; occurs when moving head or rotating head; combined with tinnitis and hearing loss; follows with ENT; ENG ordered for further evaluation; Vestibular Therapy completed; no CN deficit; based on ENG will order MRI to rule out internal auditory canal pathology vs cerebellar stroke  3. +TB test: patient had positive TB test as well as +IGRA from records and was being evaluated for latent vs active TB; has had multiple trips to [place] over the last few years; was lost to followup with Dr. Sandra Reynolds with ID; new referral ordered today with Dr. Veronika Hernandez for further evaluation and treatment; based off evaluation; patient family should be evaluated for TB; discussed this with the patient's son; referral faxed to ID office. Remainder of medical problems as per resident note.     Amelie Scott DO  9/7/2021 8:49 AM    Encounter time including independent chart review, discussion with patient, interpreting test results and/or external communications: 30'

## 2021-09-07 NOTE — PATIENT INSTRUCTIONS
Blood pressure medications refilled. Please complete electronystagmography and videonystagmography. We will check brain MRI depending on the results of these test.  Please follow up with our clinic in 3 months.

## 2021-09-07 NOTE — PROGRESS NOTES
Patient given instruction by Dr Kwadwo Sierra. 3 month follow up scheduled. Printed AVS given to patient. Patient information obtained with assistance from Phillip arreguin [employer]- [phone].

## 2021-09-08 PROBLEM — H66.001 NON-RECURRENT ACUTE SUPPURATIVE OTITIS MEDIA OF RIGHT EAR WITHOUT SPONTANEOUS RUPTURE OF TYMPANIC MEMBRANE: Status: RESOLVED | Noted: 2020-07-09 | Resolved: 2021-09-08

## 2021-09-08 PROBLEM — M54.2 NECK PAIN: Status: RESOLVED | Noted: 2020-09-18 | Resolved: 2021-09-08

## 2021-09-30 DIAGNOSIS — I10 ESSENTIAL HYPERTENSION: ICD-10-CM

## 2021-10-06 ENCOUNTER — HOSPITAL ENCOUNTER (OUTPATIENT)
Dept: AUDIOLOGY | Age: 66
Discharge: HOME | End: 2021-10-06
Payer: COMMERCIAL

## 2021-10-06 PROCEDURE — 92537 CALORIC VSTBLR TEST W/REC: CPT | Performed by: AUDIOLOGIST

## 2021-10-06 PROCEDURE — 92540 BASIC VESTIBULAR EVALUATION: CPT | Performed by: AUDIOLOGIST

## 2021-10-08 ENCOUNTER — TELEPHONE (OUTPATIENT)
Dept: INTERNAL MEDICINE | Age: 66
End: 2021-10-08

## 2021-10-08 DIAGNOSIS — R76.11 POSITIVE TB TEST: Primary | ICD-10-CM

## 2021-10-08 NOTE — TELEPHONE ENCOUNTER
Infectious Disease office called. They said the patient is positive for TB and needs to be referred to the TB Clinic. They are cancelling the patients appointment.

## 2021-10-08 NOTE — PROGRESS NOTES
External referral to [employer] [facility].     Electronically signed by Amilcar Lucia MD on 10/8/2021 at 12:13 PM

## 2021-10-14 ENCOUNTER — OFFICE VISIT (OUTPATIENT)
Dept: ENT CLINIC | Age: 66
End: 2021-10-14
Payer: COMMERCIAL

## 2021-10-14 VITALS
BODY MASS INDEX: 23.02 KG/M2 | WEIGHT: 129.9 LBS | DIASTOLIC BLOOD PRESSURE: 86 MMHG | SYSTOLIC BLOOD PRESSURE: 141 MMHG | HEIGHT: 63 IN | HEART RATE: 78 BPM

## 2021-10-14 DIAGNOSIS — Z01.818 PREOP TESTING: ICD-10-CM

## 2021-10-14 DIAGNOSIS — R42 VERTIGO: Primary | ICD-10-CM

## 2021-10-14 LAB
BUN BLDV-MCNC: 14 MG/DL (ref 6–23)
CREAT SERPL-MCNC: 0.7 MG/DL (ref 0.5–1)
GFR AFRICAN AMERICAN: >60
GFR NON-AFRICAN AMERICAN: >60 ML/MIN/1.73

## 2021-10-14 PROCEDURE — G8420 CALC BMI NORM PARAMETERS: HCPCS | Performed by: NURSE PRACTITIONER

## 2021-10-14 PROCEDURE — G8400 PT W/DXA NO RESULTS DOC: HCPCS | Performed by: NURSE PRACTITIONER

## 2021-10-14 PROCEDURE — 3017F COLORECTAL CA SCREEN DOC REV: CPT | Performed by: NURSE PRACTITIONER

## 2021-10-14 PROCEDURE — 1036F TOBACCO NON-USER: CPT | Performed by: NURSE PRACTITIONER

## 2021-10-14 PROCEDURE — 1123F ACP DISCUSS/DSCN MKR DOCD: CPT | Performed by: NURSE PRACTITIONER

## 2021-10-14 PROCEDURE — 1090F PRES/ABSN URINE INCON ASSESS: CPT | Performed by: NURSE PRACTITIONER

## 2021-10-14 PROCEDURE — 99213 OFFICE O/P EST LOW 20 MIN: CPT | Performed by: NURSE PRACTITIONER

## 2021-10-14 PROCEDURE — G8427 DOCREV CUR MEDS BY ELIG CLIN: HCPCS | Performed by: NURSE PRACTITIONER

## 2021-10-14 PROCEDURE — 4040F PNEUMOC VAC/ADMIN/RCVD: CPT | Performed by: NURSE PRACTITIONER

## 2021-10-14 PROCEDURE — G8484 FLU IMMUNIZE NO ADMIN: HCPCS | Performed by: NURSE PRACTITIONER

## 2021-10-14 NOTE — PROGRESS NOTES
[facility] Otolaryngology  Dr. Jeff Herrera. Blayne Crews. Ms.Ed        Patient Name:  Serge Lantigua  :  1955     CHIEF C/O:    Chief Complaint   Patient presents with    Results     VNG results,        HISTORY OBTAINED FROM:  patient, family member - daughter ,  Etta Blankenship #[id number]    HISTORY OF PRESENT ILLNESS:       Adan Loomis is a 78y.o. year old female, here today for follow up of dizziness. Last seen 2 months ago  Completed VNG - possible peripheral and central cause  Symptoms of dizziness continue to improve  Continues to have constant tinnitus in the right ear  Unchanged with symptoms  Head movements continue to aggravate symptoms  Completed 1 course of vestibular rehab.     Past Medical History:   Diagnosis Date    Hypertension     TB (pulmonary tuberculosis)      Past Surgical History:   Procedure Laterality Date    BRONCHOSCOPY  2016    BRONCHOSCOPY N/A 9/15/2020    BRONCHOSCOPY W/EBUS FNA performed by Vikki Stanton MD at [address] [place] [address]  9/15/2020    BRONCHOSCOPY THERAPUTIC ASPIRATION INITIAL performed by Vikki Stanton MD at [address] [place] [address]  9/15/2020    BRONCHOSCOPY ALVEOLAR LAVAGE performed by Vikki Stanton MD at [address] [place] [address]  9/15/2020    BRONCHOSCOPY ADD ON COMPUTER ASSISTED performed by Vikki Stanton MD at [address] [place] [address]  9/15/2020    BRONCHOSCOPY/TRANSBRONCHIAL NEEDLE BIOPSY performed by Vikki Stanton MD at [address] [facility] COLONOSCOPY N/A 2021    COLONOSCOPY POLYPECTOMY SNARE/COLD BIOPSY performed by Pastor Kennedy MD at [address] [facility]  2014    EXAM       Current Outpatient Medications:     amLODIPine (NORVASC) 5 MG tablet, TAKE 1 TABLET BY MOUTH EVERY DAY, Disp: 90 tablet, Rfl: 1    meclizine (ANTIVERT) 12.5 MG tablet, Take 1 tablet by mouth daily as needed for Dizziness, Disp: 20 tablet, Rfl: 1    cloNIDine (CATAPRES) 0.1 MG tablet, TAKE 1 TABLET 2 TIMES DAILY AS NEEDED FOR HIGH BLOOD PRESSURE (SYSTOLIC BLOOD PRESSURE > 160) (Patient not taking: Reported on 10/14/2021), Disp: 60 tablet, Rfl: 0  Patient has no known allergies. Social History     Tobacco Use    Smoking status: Never Smoker    Smokeless tobacco: Never Used   Vaping Use    Vaping Use: Never assessed   Substance Use Topics    Alcohol use: No    Drug use: No     Family History   Problem Relation Age of Onset    Diabetes Mother     Diabetes Father        Review of Systems   Constitutional: Negative. Negative for activity change and appetite change. HENT: Negative. Negative for congestion, ear discharge, ear pain, rhinorrhea and sinus pain. Eyes: Negative. Respiratory: Negative. Negative for shortness of breath and stridor. Cardiovascular: Negative. Negative for chest pain and palpitations. Endocrine: Negative. Genitourinary: Negative. Musculoskeletal: Negative. Skin: Negative. Neurological: Positive for dizziness (Less frequent okay). Hematological: Negative. Psychiatric/Behavioral: Negative. BP (!) 141/86 (Site: Right Upper Arm, Position: Sitting, Cuff Size: Medium Adult)   Pulse 78   Ht 5' 3\" (1.6 m)   Wt 129 lb 14.4 oz (58.9 kg)   BMI 23.01 kg/m²   Physical Exam  Constitutional:       Appearance: Normal appearance. HENT:      Head: Normocephalic. Right Ear: Tympanic membrane, ear canal and external ear normal.      Left Ear: Tympanic membrane, ear canal and external ear normal.      Nose: Nose normal. No rhinorrhea. Right Turbinates: Not swollen or pale. Left Turbinates: Not swollen or pale. Mouth/Throat:      Lips: Pink. Mouth: Mucous membranes are moist.      Pharynx: Oropharynx is clear. Eyes:      Conjunctiva/sclera: Conjunctivae normal.      Pupils: Pupils are equal, round, and reactive to light. Cardiovascular:      Rate and Rhythm: Normal rate and regular rhythm. Pulses: Normal pulses.    Pulmonary:      Effort: Pulmonary effort is normal. No respiratory distress. Breath sounds: No stridor. Musculoskeletal:         General: Normal range of motion. Cervical back: Normal range of motion. No rigidity. No muscular tenderness. Skin:     General: Skin is warm and dry. Neurological:      General: No focal deficit present. Mental Status: She is alert and oriented to person, place, and time. Psychiatric:         Mood and Affect: Mood normal.         Behavior: Behavior normal.         Thought Content: Thought content normal.         Judgment: Judgment normal.         VNG:  VNG EVALUATION     REASON FOR REFERRAL:  This patient was referred for VNG testing by SUSAN Lieberman -Landon due to dizziness. RESULTS:  Bithermal caloric irrigations revealed appropriate beating nystagmus with a right sided unilateral weakness, right ear 65% weaker. Directional preponderance and fixation suppression were within normal limits. No irregular eye movements were recorded during saccades or optokinetic testing. Pendular tracking yielded some difficulty following the stimulus at 0.2 and 0.4 Hz. [place] was questionable. No significant nystagmus was recorded during gaze testing. Positional testing yielded significant nystagmus head left with vision (right beating), and nystagmus vision denied head right (left beating), center (right beating) and left (right beating).          IMPRESSION:  VNG test results indicates a right sided vestibular weakness. Oculomotor and positional test results cannot rule out central and/or peripheral involvement.        If I can be of further assistance or provide additional information, please do not hesitate to contact this office.     Thank you for the referral.        __________________________________  Electronically signed by Neetu Thomas on 10/6/2021 at 2:42 PM     IMPRESSION/PLAN:    Destiny Hernandez was seen today for results.     Diagnoses and all orders for this visit:    Vertigo  -     MRI BRAIN W WO CONTRAST; Future  -     Ambulatory referral to Neurology    Preop testing  -     BUN & Creatinine    Other orders  -     BUN  -     Creatinine, Serum      VNG is reviewed with patient indicating a right-sided vestibular weakness but cannot also rule out a central cause. At this time an MRI of the brain with and without contrast will be ordered BUN and creatinine prior to exam.    Patient will be referred to neurology for follow-up of possible neurological cause. Patient is offered repeat physical therapy but states she would like to complete the MRI and follow-up with neurology prior to further physical therapy. She will follow up in 2 months for repeat audio and reevaluation. She is instructed to call with any new or worsening of symptoms prior to her next appointment.         Tammy Ospina, MSN, FNP-C  [facility], Nose [facility] Throat    The information contained in this note has been dictated using drug and medical speech recognition software and may contain errors

## 2021-10-18 ENCOUNTER — TELEPHONE (OUTPATIENT)
Dept: INTERNAL MEDICINE | Age: 66
End: 2021-10-18

## 2021-10-18 NOTE — TELEPHONE ENCOUNTER
----- Message from Jose Juan Carrington sent at 10/15/2021  5:36 PM EDT -----  Subject: Message to Provider    QUESTIONS  Information for Provider? Pt would like to receive a call back right away. It is regarding a medication. Pt was just on the phone with Dr. Nimo Carbajal and   would like for her to give him a call back. ---------------------------------------------------------------------------  --------------  Luis CLEMENS  What is the best way for the office to contact you? OK to leave message on   voicemail  Preferred Call Back Phone Number?  [phone]  ---------------------------------------------------------------------------  --------------  SCRIPT ANSWERS  undefined

## 2021-10-20 ENCOUNTER — TELEPHONE (OUTPATIENT)
Dept: ENT CLINIC | Age: 66
End: 2021-10-20

## 2021-10-20 ASSESSMENT — ENCOUNTER SYMPTOMS
EYES NEGATIVE: 1
STRIDOR: 0
SHORTNESS OF BREATH: 0
RESPIRATORY NEGATIVE: 1
RHINORRHEA: 0
SINUS PAIN: 0

## 2021-10-26 ENCOUNTER — TELEPHONE (OUTPATIENT)
Dept: INTERNAL MEDICINE | Age: 66
End: 2021-10-26

## 2021-10-26 NOTE — TELEPHONE ENCOUNTER
[facility] that pt has [facility]ills of [facility]ine but can not be refilled until 11/22/2021   Called pts son in law and reviewed with him

## 2021-10-26 NOTE — TELEPHONE ENCOUNTER
----- Message from [address] sent at 10/25/2021  4:36 PM EDT -----  Subject: Refill Request    QUESTIONS  Name of Medication? amLODIPine (NORVASC) 5 MG tablet  Patient-reported dosage and instructions? 1x/daily  How many days do you have left? 0  Preferred Pharmacy? [id number]  Pharmacy phone number (if available)? [phone]  ---------------------------------------------------------------------------  --------------  CALL BACK INFO  What is the best way for the office to contact you? OK to leave message on   voicemail  Preferred Call Back Phone Number?  [phone]

## 2021-11-23 DIAGNOSIS — I10 ESSENTIAL HYPERTENSION: ICD-10-CM

## 2021-11-23 RX ORDER — AMLODIPINE BESYLATE 5 MG/1
TABLET ORAL
Qty: 90 TABLET | Refills: 1 | Status: SHIPPED
Start: 2021-11-23 | End: 2022-09-07 | Stop reason: SDUPTHER

## 2021-11-23 NOTE — TELEPHONE ENCOUNTER
Last Appointment:  9/7/2021  Future Appointments   Date Time Provider García Fowler   12/16/2021  8:00 AM SCHEDULE, LUIS ANTONIO [facility]   12/16/2021  8:15 AM SUSAN Varela - CNP [place] [facility]   1/12/2022  2:00 PM Albin Crigler, APRN - CNS [facility]   8/4/2022  9:00 AM Lupe Hsieh MD [facility]

## 2021-12-03 NOTE — TELEPHONE ENCOUNTER
Late entry: per Dr. Tammi Leonard    I called the patient and she is not at home. I spoke to her son-in-law. He said that her blood pressure is labile. He thought she is only taking clonidine as needed. I explained to him about her BP regimen (amlodipine scheduled 5 mg daily and clonidine 1 tablet as needed when SBP > 170 mmHg). He will passed the message to her. Not sure with her TB whether we can bring her back for BP check.

## 2022-02-09 NOTE — PROGRESS NOTES
[facility]  Nephrology  Progress Note     Patient Name: Orion Ty  MRN: [id number]  Admission Date: 1/5/2022  Hospital Length of Stay: 35 days  Attending Provider: Dennis Haider MD   Primary Care Physician: Otis Zimmer MD  Principal Problem: History of mechanical aortic valve replacement    Subjective:     HPI: Orion Ty is our 76 y.o. female with previous aortic valve replacement, mitral valve replacement, and tricuspid valve repair in 2010 who presented on 1/5 for redo aortic valve replacement. Nephrology consulted on 1/8 for anuric JO. Patient is alert but tired, son at bedside. Stated she was tired prior to admission. During the hospital stay Cr increase from 1 to 1.5. In OR she received  2.5L crystalloid, 3U RBC, 2U FFP, 2 platelets, and 800 cell saver. she is +5 L since admit. UOP decrease overnight, per nurse she didn't urinate at all. She received diuril 500 mg twice, lasix 20 mg *2 on 1/7. This morning she urinate 225 immediately after placing the tejeda's cath. She received again lasix 100 mg this morning. Upor evaluation she was alert but repeatedly saying she was tired, denied any pain. Denied any previous hx of kidney disease.       Interval History: Seen at the bedside no event overnight       Review of patient's allergies indicates:  No Known Allergies   Current Facility-Administered Medications   Medication Frequency    0.9%  NaCl infusion (CRRT USE ONLY) Continuous    0.9%  NaCl infusion (CRRT USE ONLY) Continuous    0.9%  NaCl infusion (for blood administration) Q24H PRN    0.9%  NaCl infusion (for blood administration) Q24H PRN    0.9%  NaCl infusion PRN    acetaminophen oral solution 650 mg Q4H PRN    albuterol-ipratropium 2.5 mg-0.5 mg/3 mL nebulizer solution 3 mL Q6H    amiodarone tablet 200 mg Daily    atorvastatin tablet 40 mg Daily    bisacodyL suppository 10 mg Daily PRN    dextrose 10 % infusion Continuous PRN    dextrose 10% bolus 125  VNG EVALUATION    REASON FOR REFERRAL:  This patient was referred for VNG testing by SUSAN Gardner -Landon due to dizziness. RESULTS:  Bithermal caloric irrigations revealed appropriate beating nystagmus with a right sided unilateral weakness, right ear 65% weaker. Directional preponderance and fixation suppression were within normal limits. No irregular eye movements were recorded during saccades or optokinetic testing. Pendular tracking yielded some difficulty following the stimulus at 0.2 and 0.4 Hz. [place] was questionable. No significant nystagmus was recorded during gaze testing. Positional testing yielded significant nystagmus head left with vision (right beating), and nystagmus vision denied head right (left beating), center (right beating) and left (right beating). IMPRESSION:  VNG test results indicates a right sided vestibular weakness. Oculomotor and positional test results cannot rule out central and/or peripheral involvement. If I can be of further assistance or provide additional information, please do not hesitate to contact this office.     Thank you for the referral.      __________________________________  Electronically signed by Neetu Ziegler on 10/6/2021 at 2:42 PM mL PRN    glucagon (human recombinant) injection 1 mg PRN    guaiFENesin 100 mg/5 ml syrup 200 mg Q4H PRN    haloperidol lactate injection 2 mg Nightly PRN    heparin (porcine) injection 1,000 Units PRN    hydrOXYzine 10 mg/5 mL syrup 10 mg Q6H PRN    insulin aspart U-100 pen 1-10 Units PRN    insulin aspart U-100 pen 4 Units Q24H    insulin aspart U-100 pen 4 Units Q24H    insulin aspart U-100 pen 4 Units Q24H    insulin aspart U-100 pen 4 Units Q24H    insulin aspart U-100 pen 4 Units Q24H    insulin aspart U-100 pen 4 Units Q24H    LIDOcaine HCL 2% jelly PRN    magnesium sulfate 2g in water 50mL IVPB (premix) PRN    magnesium sulfate 2g in water 50mL IVPB (premix) PRN    midodrine tablet 15 mg Q8H    ondansetron injection 4 mg Q12H PRN    oxyCODONE 5 mg/5 mL solution 5 mg Q4H PRN    oxymetazoline 0.05 % nasal spray 2 spray BID    pantoprazole injection 40 mg Q12H    piperacillin-tazobactam 4.5 g in sodium chloride 0.9% 100 mL IVPB (ready to mix system) Q12H    polyethylene glycol packet 17 g Daily PRN    psyllium husk (aspartame) 3.4 gram packet 1 packet Daily    sodium chloride 0.9% bolus 250 mL PRN    sodium chloride 0.9% bolus 250 mL PRN    sodium chloride 0.9% flush 10 mL PRN    vasopressin (PITRESSIN) 0.2 Units/mL in dextrose 5 % 100 mL infusion Continuous     Facility-Administered Medications Ordered in Other Encounters   Medication Frequency    0.9%  NaCl infusion Continuous       Objective:     Vital Signs (Most Recent):  Temp: 98 °F (36.7 °C) (02/09/22 1100)  Pulse: 80 (02/09/22 1100)  Resp: (!) 30 (02/09/22 1100)  BP: (!) 91/50 (02/09/22 1100)  SpO2: (!) 92 % (02/09/22 1100)  O2 Device (Oxygen Therapy): ventilator (02/09/22 0744) Vital Signs (24h Range):  Temp:  [96.4 °F (35.8 °C)-98.4 °F (36.9 °C)] 98 °F (36.7 °C)  Pulse:  [] 80  Resp:  [16-42] 30  SpO2:  [87 %-99 %] 92 %  BP: ()/(44-59) 91/50   Weight: 49.1 kg (108 lb 3.9 oz) (02/03/22 0444)  Body mass index is  20.45 kg/m².  Body surface area is 1.45 meters squared.      Intake/Output Summary (Last 24 hours) at 2/9/2022 1140  Last data filed at 2/9/2022 0735  Gross per 24 hour   Intake 3211.05 ml   Output 2376 ml   Net 835.05 ml     I/O last 3 completed shifts:  In: 3646.9 [I.V.:2385.2; NG/GT:965; IV Piggyback:296.8]  Out: 2376 [Drains:200; Other:2176]  Net IO Since Admission: 10,064.87 mL [02/09/22 1140]     Physical Exam  Constitutional:       Appearance: She is well-developed. She is ill-appearing.   Cardiovascular:      Rate and Rhythm: Normal rate and regular rhythm.      Heart sounds: Normal heart sounds.   Pulmonary:      Comments: tracheostomy   Abdominal:      General: Abdomen is flat.      Palpations: Abdomen is soft.   Musculoskeletal:         General: Normal range of motion.      Right lower leg: No edema.      Left lower leg: No edema.   Skin:     General: Skin is warm and dry.      Comments: Sternal Incision CDI   Neurological:      Mental Status: She is alert.   Psychiatric:         Mood and Affect: Mood normal.         Behavior: Behavior normal.         Recent Labs   Lab 02/07/22  0400 02/08/22  0331 02/08/22  0331 02/08/22  0511 02/09/22  0315 02/09/22  0520   WBC  --  19.51*  --   --  22.99* 21.55*   HGB  --  7.3*  --   --  6.1* 6.1*   HCT   < > 23.3*  --  28* 20.9* 21.2*   PLT  --  117*  --   --  118* 136*   MONO  --  4.3  0.8   < >  --  5.0  1.2* 3.6*  0.8    < > = values in this interval not displayed.      Recent Labs   Lab 02/08/22  0331 02/08/22  1611 02/08/22  2217 02/09/22  0315 02/09/22  0520   *   < > 133* 132* 132*   K 4.0   < > 4.6 4.4 4.4      < > 103 103 103   CO2 22*   < > 19* 18* 17*   BUN 47*   < > 65* 68* 73*   CREATININE 1.8*   < > 2.1* 2.1* 2.3*   CALCIUM 8.6*   < > 8.5* 8.5* 8.5*   PROT 6.1  --   --  5.8* 5.8*   BILITOT 0.8  --   --  0.8 0.7   ALKPHOS 129  --   --  120 118   ALT 25  --   --  22 21   AST 39  --   --  42* 37    < > = values in this interval not displayed.       Recent Labs     02/07/22  0400 02/08/22  0511   PH 7.284* 7.299*   PCO2 52.8* 47.2*   PO2 32* 91   HCO3 25.0 23.2*   POCSATURATED 53* 96   BE -2 -3       Assessment/Plan:        JO  acute kidney injury  Orion Ty is our 76 y.o. female with previous aortic valve replacement, mitral valve replacement, and tricuspid valve repair in 2010 who presented on 1/5 for redo aortic valve replacement. Nephrology consulted on 1/8 for anuric JO. During the hospital stay Cr increase from 1 to 1.5. In OR she received  2.5L crystalloid, 3U RBC, 2U FFP, 2 platelets, and 800 cell saver. she is +5 L since admit. UOP decreased. She received diuril 500 mg twice, lasix 20 mg x2 on 1/7 with no adequate response to diuretics     Plan   Oliguric ischemic ATN likely secondary to severe intraoperative hypotension requiring vasopressor on 01/05/2022  Failed high-dose diuretics and renal replacement therapy started on 1/9/22 due to volume overload  We been mangeing pt with CRRT vd iHD based on hemodynamics status  Will plan for 6 hrs SLED and 6 hrs SCUF   Strict I/Os   Renally dose medications to GFR      S/P aortic valve replacement  With severe intraoperative hypotension   Management per primary     ATN (acute tubular necrosis)  See jo      Rose Mary Walsh MD  Nephrology  [facility]

## 2022-03-07 DIAGNOSIS — I10 ESSENTIAL HYPERTENSION: ICD-10-CM

## 2022-03-07 RX ORDER — CLONIDINE HYDROCHLORIDE 0.1 MG/1
TABLET ORAL
Qty: 60 TABLET | Refills: 1 | OUTPATIENT
Start: 2022-03-07

## 2022-03-07 NOTE — TELEPHONE ENCOUNTER
Last Appointment:  9/7/2021  Future Appointments   Date Time Provider García Fowler   3/8/2022  9:20 AM [facility]   8/4/2022  9:00 AM [facility]
Clear bilaterally, pupils equal, round and reactive to light.

## 2022-03-08 ENCOUNTER — OFFICE VISIT (OUTPATIENT)
Dept: NEUROLOGY | Age: 67
End: 2022-03-08
Payer: COMMERCIAL

## 2022-03-08 VITALS
OXYGEN SATURATION: 97 % | HEART RATE: 78 BPM | WEIGHT: 125 LBS | DIASTOLIC BLOOD PRESSURE: 86 MMHG | HEIGHT: 63 IN | BODY MASS INDEX: 22.15 KG/M2 | SYSTOLIC BLOOD PRESSURE: 148 MMHG | TEMPERATURE: 97.3 F

## 2022-03-08 DIAGNOSIS — R42 VERTIGO: Primary | ICD-10-CM

## 2022-03-08 DIAGNOSIS — G43.809 VESTIBULAR MIGRAINE: ICD-10-CM

## 2022-03-08 PROCEDURE — G8400 PT W/DXA NO RESULTS DOC: HCPCS | Performed by: NURSE PRACTITIONER

## 2022-03-08 PROCEDURE — G8427 DOCREV CUR MEDS BY ELIG CLIN: HCPCS | Performed by: NURSE PRACTITIONER

## 2022-03-08 PROCEDURE — 1090F PRES/ABSN URINE INCON ASSESS: CPT | Performed by: NURSE PRACTITIONER

## 2022-03-08 PROCEDURE — 3017F COLORECTAL CA SCREEN DOC REV: CPT | Performed by: NURSE PRACTITIONER

## 2022-03-08 PROCEDURE — 99204 OFFICE O/P NEW MOD 45 MIN: CPT | Performed by: NURSE PRACTITIONER

## 2022-03-08 PROCEDURE — G8484 FLU IMMUNIZE NO ADMIN: HCPCS | Performed by: NURSE PRACTITIONER

## 2022-03-08 PROCEDURE — 1036F TOBACCO NON-USER: CPT | Performed by: NURSE PRACTITIONER

## 2022-03-08 PROCEDURE — G8420 CALC BMI NORM PARAMETERS: HCPCS | Performed by: NURSE PRACTITIONER

## 2022-03-08 PROCEDURE — 1123F ACP DISCUSS/DSCN MKR DOCD: CPT | Performed by: NURSE PRACTITIONER

## 2022-03-08 PROCEDURE — 4040F PNEUMOC VAC/ADMIN/RCVD: CPT | Performed by: NURSE PRACTITIONER

## 2022-03-08 RX ORDER — NORTRIPTYLINE HYDROCHLORIDE 10 MG/1
10 CAPSULE ORAL NIGHTLY
Qty: 30 CAPSULE | Refills: 2 | Status: SHIPPED
Start: 2022-03-08 | End: 2022-05-12 | Stop reason: ALTCHOICE

## 2022-03-08 NOTE — PATIENT INSTRUCTIONS
Patient Education        nortriptyline  Pronunciation:  nor TRIP ti faizan  Brand:  Goran  What is the most important information I should know about nortriptyline? Some young people have thoughts about suicide when first taking an antidepressant. Stay alert to changes in your mood or symptoms. Report any new or worsening symptoms to your doctor. What is nortriptyline? Nortriptyline is a tricyclic antidepressant that is used to treat symptoms of depression. Nortriptyline may also be used for purposes not listed in this medication guide. What should I discuss with my healthcare provider before taking nortriptyline? You should not use this medicine if:  · you are allergic to nortriptyline or similar medicines (amitriptyline, amoxapine, clomipramine, desipramine, doxepin, imipramine, protriptyline, trimipramine);  · you are allergic to certain seizure medications (carbamazepine, eslicarbazepine, oxcarbazepine, rufinamide); or  · you recently had a heart attack. Do not use nortriptyline if you have used an MAO inhibitor in the past 14 days. A dangerous drug interaction could occur. MAO inhibitors include isocarboxazid, linezolid, methylene blue injection, phenelzine, tranylcypromine, and others. Tell your doctor if you have used an \"SSRI\" antidepressant in the past 5 weeks, such as citalopram, escitalopram, fluoxetine (Prozac), fluvoxamine, paroxetine, sertraline (Zoloft), trazodone, or vilazodone. Tell your doctor if you also take stimulant medicine, opioid medicine, herbal products, or medicine for depression, mental illness, Parkinson's disease, migraine headaches, serious infections, or prevention of nausea and vomiting. An interaction with nortriptyline could cause a serious condition called serotonin syndrome.   Tell your doctor if you have ever had:  · unexplained fainting spells;  · a genetic heart condition called Brugada syndrome;  · a family history of unexplained death at younger than 39 years old;  · heart disease;  · a heart attack or stroke;  · a seizure;  · bipolar disorder (manic-depression);  · schizophrenia or other mental illness;  · a thyroid disorder;  · problems with urination;  · narrow-angle glaucoma; or  · if you are receiving electroshock treatment. Some young people have thoughts about suicide when first taking an antidepressant. Your doctor should check your progress at regular visits. Your family or other caregivers should also be alert to changes in your mood or symptoms. Tell your doctor if you are pregnant or breastfeeding. Not approved for use by anyone younger than 25years old. How should I take nortriptyline? Follow all directions on your prescription label and read all medication guides or instruction sheets. Your doctor may occasionally change your dose. Use the medicine exactly as directed. Measure liquid medicine with the supplied syringe or a dose-measuring device (not a kitchen spoon). Tell your doctor if you have a planned surgery. You may have withdrawal symptoms if you stop using nortriptyline suddenly. Ask your doctor before stopping the medicine. Your symptoms may not improve for a few weeks. Store at room temperature away from moisture and heat. Keep the bottle tightly closed when not in use. What happens if I miss a dose? Take the medicine as soon as you can, but skip the missed dose if it is almost time for your next dose. Do not take two doses at one time. What happens if I overdose? Seek emergency medical attention or call the Poison Help line at 1-230.978.9051. An overdose can be fatal.  Overdose symptoms may include irregular heartbeats, severe drowsiness, vision problems, confusion, hallucinations, agitation, stiff muscles, overactive reflexes, vomiting, feeling hot or cold, feeling like you might pass out, seizures, or coma. What should I avoid while taking nortriptyline? Do not drink alcohol. Dangerous side effects or death could occur.   Avoid driving or hazardous activity until you know how this medicine will affect you. Your reactions could be impaired. Nortriptyline could make you sunburn more easily. Avoid sunlight or tanning beds. Wear protective clothing and use sunscreen (SPF 30 or higher) when you are outdoors. What are the possible side effects of nortriptyline? Get emergency medical help if you have signs of an allergic reaction: hives; difficult breathing; swelling of your face, lips, tongue, or throat. Report any new or worsening symptoms to your doctor, such as: mood or behavior changes, anxiety, panic attacks, trouble sleeping, or if you feel impulsive, irritable, agitated, hostile, aggressive, restless, hyperactive (mentally or physically), more depressed, or have thoughts about suicide or hurting yourself. Call your doctor at once if you have:  · blurred vision, tunnel vision, eye pain or swelling, or seeing halos around lights;  · restless muscle movements in your eyes, tongue, jaw, or neck;  · a light-headed feeling, like you might pass out;  · seizure (convulsions);  · new or worsening chest pain, pounding heartbeats or fluttering in your chest;  · sudden numbness or weakness, problems with vision, speech, or balance;  · fever, sore throat, easy bruising, unusual bleeding;  · painful or difficult urination; or  · jaundice (yellowing of the skin or eyes). Seek medical attention right away if you have symptoms of serotonin syndrome, such as: agitation, hallucinations, fever, sweating, shivering, fast heart rate, muscle stiffness, twitching, loss of coordination, nausea, vomiting, or diarrhea. Common side effects may include:  · increased blood pressure;  · numbness or tingling in your hands or feet;  · dry mouth, nausea, vomiting, loss of appetite;  · blurred vision;  · rash, itching; or  · breast swelling (in men or women). This is not a complete list of side effects and others may occur.  Call your doctor for medical advice about side effects. You may report side effects to FDA at 4-461-FDA-5839. What other drugs will affect nortriptyline? Using nortriptyline with other drugs that make you drowsy can worsen this effect. Ask your doctor before using opioid medication, a sleeping pill, a muscle relaxer, or medicine for anxiety or seizures. Tell your doctor about all your other medicines, especially:  · medicine to treat depression, anxiety, mood disorders, or mental illness;  · cold or allergy medicine (Benadryl, Sudafed, and others);  · a stimulant medicine, such as diet pills or ADHD medicine;  · medicine to treat Parkinson's disease;  · medicine to treat stomach problems, motion sickness, or irritable bowel syndrome;  · medicine to treat overactive bladder; or  · bronchodilator asthma medication. This list is not complete. Other drugs may affect nortriptyline, including prescription and over-the-counter medicines, vitamins, and herbal products. Not all possible drug interactions are listed here. Where can I get more information? Your pharmacist can provide more information about nortriptyline. Remember, keep this and all other medicines out of the reach of children, never share your medicines with others, and use this medication only for the indication prescribed. Every effort has been made to ensure that the information provided by [address] is accurate, up-to-date, and complete, but no guarantee is made to that effect. Drug information contained herein may be time sensitive. [facility] information has been compiled for use by healthcare practitioners and consumers in the [place] and therefore [facility] does not warrant that uses outside of the [place] are appropriate, unless specifically indicated otherwise. [facility]'s drug information does not endorse drugs, diagnose patients or recommend therapy.  [facility]'s drug information is an informational resource designed to assist licensed healthcare practitioners in caring for their patients and/or to serve consumers viewing this service as a supplement to, and not a substitute for, the expertise, skill, knowledge and judgment of healthcare practitioners. The absence of a warning for a given drug or drug combination in no way should be construed to indicate that the drug or drug combination is safe, effective or appropriate for any given patient. [facility] does not assume any responsibility for any aspect of healthcare administered with the aid of information [facility] provides. The information contained herein is not intended to cover all possible uses, directions, precautions, warnings, drug interactions, allergic reactions, or adverse effects. If you have questions about the drugs you are taking, check with your doctor, nurse or pharmacist.  Copyright 1041-2616 [address]: 12.02. Revision date: 6/14/2021. Care instructions adapted under license by [facility] ([facility]). If you have questions about a medical condition or this instruction, always ask your healthcare professional. [place] [address] any warranty or liability for your use of this information.

## 2022-03-08 NOTE — PROGRESS NOTES
[address] [facility]. Robert Sheikh M.D., F.A.C.P. Katy Mason, DNP, APRN, ACNS-BC  Mikayla Cerrato. Tomi Hernadez, MSN, APRN-FNP-C  Zohra Mg, MSN, APRN-FNP-C  ARIANDA Rich, RENAE Zhou, MSN, APRN-FNP-C  [address] [facility]  [place], [address]  Phone: [phone]  Fax: [phone]       Kyra Goel is a 78 y.o. right handed female     Patient referred for further evaluation of vertigo    She is accompanied by her niece today who is also serving as her     Patient speaks [facility] dialect and this office does not have Wi-Fi to use an iPad for translation    Past Medical History:     Past Medical History:   Diagnosis Date    Hypertension     TB (pulmonary tuberculosis)      No history of liver or kidney disease. No history of strokes or seizures. No history of connective tissue disorders or cancers. No history of exposures to toxins or chemicals. History of hits to the head: None  Injuries: None  MVAs: None    Past Surgical History:       Past Surgical History:   Procedure Laterality Date    BRONCHOSCOPY  12/14/2016    BRONCHOSCOPY N/A 9/15/2020    BRONCHOSCOPY W/EBUS FNA performed by Odilia Rodriguez MD at [address] [place] [address]  9/15/2020    BRONCHOSCOPY THERAPUTIC ASPIRATION INITIAL performed by Odilia Rodriguez MD at [address] [place] [address]  9/15/2020    BRONCHOSCOPY ALVEOLAR LAVAGE performed by Odilia Rodriguez MD at [address] [place] [address]  9/15/2020    BRONCHOSCOPY ADD ON COMPUTER ASSISTED performed by Odilia Rodriguez MD at [address] [place] [address]  9/15/2020    BRONCHOSCOPY/TRANSBRONCHIAL NEEDLE BIOPSY performed by Odilia Rodriguez MD at [address] [facility] COLONOSCOPY N/A 1/26/2021    COLONOSCOPY POLYPECTOMY SNARE/COLD BIOPSY performed by Dasha Bai MD at [address] [facility]  7/23/2014    EXAM     Allergies:       Patient has no known allergies.     Medications:     Prior to Admission medications    Medication Sig Start Date End Date Taking? Authorizing Provider   amLODIPine (NORVASC) 5 MG tablet TAKE 1 TABLET BY MOUTH EVERY DAY 11/23/21  Yes Teresa Khanna MD   meclizine (ANTIVERT) 12.5 MG tablet Take 1 tablet by mouth daily as needed for Dizziness 8/26/21  Yes SUSAN Huntley CNP   cloNIDine (CATAPRES) 0.1 MG tablet TAKE 1 TABLET 2 TIMES DAILY AS NEEDED FOR HIGH BLOOD PRESSURE (SYSTOLIC BLOOD PRESSURE > 160)  Patient not taking: Reported on 10/14/2021 9/7/21   Mary Og MD     Social History:       She reports that she has never smoked. She has never used smokeless tobacco. She reports that she does not drink alcohol and does not use drugs. Review of Systems:     Sleep: does not sleep well gets around 5 hours a night goes to bed around 8 pm     No issues with chewing or swallowing  No chest pain or palpitations  No SOB  No vertigo, lightheadedness or loss of consciousness  No falls, tripping or stumbling  No incontinence of bowels or bladder  No itching or bruising appreciated  No numbness, tingling or focal arm/leg weakness    ROS is otherwise negative    Family History:     Family History   Problem Relation Age of Onset    Diabetes Mother     Diabetes Father       History of Present Illness:     Patient referred for further evaluation of vertigo. Onset of dizziness began about 2 years ago. Niece notes that patient had eaten some seafood earlier for dinner and around midnight she got up and started experiencing some nausea but did not have any vomiting. Patient was complaining of the room spinning. Dizziness was positional but especially worse while lying down. Patient was complaining of a headache at that time. Patient now notes that she feels somewhat imbalanced when walking and still has a spinning sensation. She has no double vision or changes in her vision during these episodes. She does note some diaphoresis during the episode but no other symptoms.   Patient strength  5/5   XII: tongue strength  Normal     Motor:  5/5 throughout  Normal bulk and tone  No drift   No abnormal movements    Sensory:  LT and PP normal  Vibration normal    Coordination:   FN, FFM and TOVA normal  HS normal    Gait:  Normal    DTR:   2+ throughout    No Torres's    No other pathological reflexes    Laboratory/Radiology:  ry/Radiology:     CBC with Differential:    Lab Results   Component Value Date    WBC 8.1 03/26/2019    RBC 4.54 03/26/2019    HGB 13.5 03/26/2019    HCT 41.9 03/26/2019     03/26/2019    MCV 92.3 03/26/2019    MCH 29.7 03/26/2019    MCHC 32.2 03/26/2019    RDW 13.1 03/26/2019    LYMPHOPCT 20.0 03/26/2019    MONOPCT 4.9 03/26/2019    BASOPCT 0.5 03/26/2019    MONOSABS 0.40 03/26/2019    LYMPHSABS 1.63 03/26/2019    EOSABS 100 09/27/2019    EOSABS 0.07 03/26/2019    BASOSABS 0.04 03/26/2019     CMP:    Lab Results   Component Value Date     09/27/2019    K 4.8 09/27/2019     09/27/2019    CO2 24 09/27/2019    BUN 14 10/14/2021    CREATININE 0.7 10/14/2021    GFRAA >60 10/14/2021    LABGLOM >60 10/14/2021    GLUCOSE 104 09/27/2019    PROT 8.6 09/27/2019    LABALBU 4.6 09/27/2019    CALCIUM 10.0 09/27/2019    BILITOT 0.5 09/27/2019    ALKPHOS 89 09/27/2019    AST 16 09/27/2019    ALT 11 09/27/2019     Hepatic Function Panel:    Lab Results   Component Value Date    ALKPHOS 89 09/27/2019    ALT 11 09/27/2019    AST 16 09/27/2019    PROT 8.6 09/27/2019    BILITOT 0.5 09/27/2019    LABALBU 4.6 09/27/2019     MRI brain: Negative for acute findings    All labs and images were personally reviewed at the time of this visit    Assessment:     Vertigo possibly caused by ? vestibular migraine  --- ENT work-up did show right-sided vestibular weakness  --- MRI brain was negative for stroke especially cerebellar stroke  --- We will try her on low-dose nortriptyline to see if this helps    Plan:     Started nortriptyline 10 mg nightly    Follow-up in 2 months    Call with any questions or concerns      Beatriz Godoy, APRN - CNP  9:14 AM  3/8/2022    I spent 45 minutes with this patient obtaining the HPI and discussing the exam with greater than 50% of the time providing counseling and education on medications and other treatment plans. All questions were answered prior to leaving my office.

## 2022-03-31 RX ORDER — NORTRIPTYLINE HYDROCHLORIDE 10 MG/1
CAPSULE ORAL
Qty: 30 CAPSULE | Refills: 2 | OUTPATIENT
Start: 2022-03-31

## 2022-05-12 ENCOUNTER — TELEPHONE (OUTPATIENT)
Dept: NEUROLOGY | Age: 67
End: 2022-05-12

## 2022-05-12 ENCOUNTER — OFFICE VISIT (OUTPATIENT)
Dept: NEUROLOGY | Age: 67
End: 2022-05-12
Payer: COMMERCIAL

## 2022-05-12 VITALS
OXYGEN SATURATION: 97 % | DIASTOLIC BLOOD PRESSURE: 79 MMHG | BODY MASS INDEX: 22.15 KG/M2 | HEART RATE: 98 BPM | HEIGHT: 63 IN | WEIGHT: 125 LBS | SYSTOLIC BLOOD PRESSURE: 139 MMHG | TEMPERATURE: 97.9 F

## 2022-05-12 DIAGNOSIS — R42 VERTIGO: Primary | ICD-10-CM

## 2022-05-12 PROCEDURE — 1123F ACP DISCUSS/DSCN MKR DOCD: CPT | Performed by: NURSE PRACTITIONER

## 2022-05-12 PROCEDURE — G8400 PT W/DXA NO RESULTS DOC: HCPCS | Performed by: NURSE PRACTITIONER

## 2022-05-12 PROCEDURE — G8427 DOCREV CUR MEDS BY ELIG CLIN: HCPCS | Performed by: NURSE PRACTITIONER

## 2022-05-12 PROCEDURE — 1090F PRES/ABSN URINE INCON ASSESS: CPT | Performed by: NURSE PRACTITIONER

## 2022-05-12 PROCEDURE — 1036F TOBACCO NON-USER: CPT | Performed by: NURSE PRACTITIONER

## 2022-05-12 PROCEDURE — 3017F COLORECTAL CA SCREEN DOC REV: CPT | Performed by: NURSE PRACTITIONER

## 2022-05-12 PROCEDURE — 4040F PNEUMOC VAC/ADMIN/RCVD: CPT | Performed by: NURSE PRACTITIONER

## 2022-05-12 PROCEDURE — 99214 OFFICE O/P EST MOD 30 MIN: CPT | Performed by: NURSE PRACTITIONER

## 2022-05-12 PROCEDURE — G8420 CALC BMI NORM PARAMETERS: HCPCS | Performed by: NURSE PRACTITIONER

## 2022-05-12 RX ORDER — DIAZEPAM 2 MG/1
2 TABLET ORAL EVERY 8 HOURS PRN
Qty: 30 TABLET | Refills: 0 | Status: SHIPPED
Start: 2022-05-12 | End: 2022-08-09 | Stop reason: SDUPTHER

## 2022-05-12 NOTE — PROGRESS NOTES
[address] [facility]. Carson Hankins M.D., F.A.C.P. Kezia Bedoya, ROSIE, APRN, [facility]  Kelvin Griggs. Justina Mackey, MSN, APRN-FNP-C  Willie Fall, MSN, APRN-FNP-C  ARIADNA Dominguez, PA-C  Dago Wagoner, MSN, APRN-FNP-C  [address] [address]  [place], [address]  Phone: [phone]  Fax: [phone]       Mearl Mortimer is a 78 y.o. right handed female     Patient referred for further evaluation of vertigo    She is accompanied by her niece today who is also serving as her     Patient speaks [facility] dialect and this office does not have Wi-Fi to use an iPad for translation    Patient referred for further evaluation of vertigo. Onset of dizziness began about 2 years ago. Niece notes that patient had eaten some seafood earlier for dinner and around midnight she got up and started experiencing some nausea but did not have any vomiting. Patient was complaining of the room spinning. Dizziness was positional but especially worse while lying down. Patient was complaining of a headache at that time. Patient now notes that she feels somewhat imbalanced when walking and still has a spinning sensation. She has no double vision or changes in her vision during these episodes. She does note some diaphoresis during the episode but no other symptoms. Patient does not have a history of headaches or family history of headaches. She has tried meclizine 12.5 mg which did work for her. She was not started on any new medications at that time. No hits to the head. Patient did have MRI of her brain that was ordered by ENT. MRI brain was negative for acute findings. Roseann-Hallpike was performed by ENT as well which was questionable although with positional testing patient did have significant nystagmus left with vision. VNG testing results indicated a right-sided vestibular weakness. She did have vestibular therapy as well.     We tried her on nortriptyline nightly which helped her headaches but continues to have dizziness.       Sleep: does not sleep well gets around 5 hours a night goes to bed around 8 pm     No issues with chewing or swallowing  No chest pain or palpitations  No SOB  No vertigo, lightheadedness or loss of consciousness  No falls, tripping or stumbling  No incontinence of bowels or bladder  No itching or bruising appreciated  No numbness, tingling or focal arm/leg weakness    ROS is otherwise negative    Objective:     Vitals:    05/12/22 1017 05/12/22 1049   BP: 139/79    Site: Left Upper Arm    Position: Sitting    Cuff Size: Small Adult    Pulse: 98    Temp: 97.9 °F (36.6 °C)    TempSrc: Temporal    SpO2: (!) 76% 97%   Weight: 125 lb (56.7 kg)    Height: 5' 3\" (1.6 m)      General appearance: alert, appears stated age, cooperative and in no distress  Head: normocephalic, without obvious abnormality, atraumatic  Eyes: conjunctivae/corneas clear; no drainage  Neck: no carotid bruit, supple, symmetrical, trachea midline   Lungs: clear to auscultation bilaterally  Heart: regular rate and rhythm, S1, S2 normal  Abdomen: soft, non-tender; bowel sounds normal  Extremities: normal, atraumatic, no cyanosis or edema  Skin:  color, texture, turgor normal--no rashes or lesions      Mental Status: alert and oriented x 4    Appropriate attention/concentration  Intact fundus of knowledge      Speech: no dysarthria  Language: no aphasias    Cranial Nerves:  I: smell    II: visual acuity     II: visual fields Full    II: pupils TODD   III,VII: ptosis None   III,IV,VI: extraocular muscles  EOMI without nystagmus   V: mastication Normal   V: facial light touch sensation  Normal   V,VII: corneal reflex     VII: facial muscle function - upper  Normal   VII: facial muscle function - lower Normal   VIII: hearing Left decreased   IX: soft palate elevation  Normal   IX,X: gag reflex    XI: trapezius strength  5/5   XI: sternocleidomastoid strength 5/5   XI: neck extension strength  5/5 XII: tongue strength  Normal     Motor:  5/5 throughout  Normal bulk and tone  No drift   No abnormal movements    Sensory:  LT normal    Coordination:   FN, FFM and TOVA normal    Gait:  Normal    DTR:   2+ throughout    Laboratory/Radiology:  ry/Radiology:     No recent labs or imaging studies reviewed independently today    Assessment:     Vertigo possibly caused by ? vestibular migraine  --- ENT work-up did show right-sided vestibular weakness  --- MRI brain was negative for stroke especially cerebellar stroke  --- tried nortriptyline helped with headaches but dizziness pursues     Plan:     Started trial of Valium 2 mg prn 8 hours     Discontinue nortriptyline     Follow-up in 1 month     Call with any questions or concerns      Silvia Nair, SUSAN - CNP  10:04 AM  5/12/2022

## 2022-05-12 NOTE — TELEPHONE ENCOUNTER
Prior Authorization not required for patient/medication   Case ID: changepayer      Payer:  [employer] Generic Payer    [phone]    [facility] has predicted that this prior auth will not be required. View History       Medication Being Authorized     diazePAM (VALIUM) 2 MG tablet    Take 1 tablet by mouth every 8 hours as needed (vertigo) for up to 10 days. Dispense: 30 tablet Refills: 0     Start: 5/12/2022 End: 5/22/2022     Class: Normal Diagnoses: Vertigo     This order has been released to its destination.    To be filled at: [facility]pharmacy #[id number]- [place] [facility] [address] [facility]

## 2022-07-05 ENCOUNTER — TELEPHONE (OUTPATIENT)
Dept: PULMONOLOGY | Age: 67
End: 2022-07-05

## 2022-07-05 NOTE — TELEPHONE ENCOUNTER
Mailed a letter to patient informing her that her CT Chest is scheduled for 8-2-22 at 10:00 am at [address] [facility].  She must arrive by 9:30 am. There is no prep for this test
Time-based billing (NON-critical care)

## 2022-08-02 ENCOUNTER — HOSPITAL ENCOUNTER (OUTPATIENT)
Age: 67
Discharge: HOME | End: 2022-08-04
Payer: COMMERCIAL

## 2022-08-02 ENCOUNTER — HOSPITAL ENCOUNTER (OUTPATIENT)
Dept: CT IMAGING | Age: 67
Discharge: HOME | End: 2022-08-02

## 2022-08-02 ENCOUNTER — HOSPITAL ENCOUNTER (OUTPATIENT)
Dept: CT IMAGING | Age: 67
Discharge: HOME | End: 2022-08-04
Payer: COMMERCIAL

## 2022-08-02 ENCOUNTER — HOSPITAL ENCOUNTER (OUTPATIENT)
Dept: CT IMAGING | Age: 67
End: 2022-08-02
Payer: COMMERCIAL

## 2022-08-02 DIAGNOSIS — R91.1 LUNG NODULE: ICD-10-CM

## 2022-08-02 PROCEDURE — 71250 CT THORAX DX C-: CPT

## 2022-08-09 ENCOUNTER — OFFICE VISIT (OUTPATIENT)
Dept: NEUROLOGY | Age: 67
End: 2022-08-09
Payer: COMMERCIAL

## 2022-08-09 VITALS
TEMPERATURE: 98.8 F | HEART RATE: 66 BPM | DIASTOLIC BLOOD PRESSURE: 82 MMHG | OXYGEN SATURATION: 98 % | SYSTOLIC BLOOD PRESSURE: 161 MMHG

## 2022-08-09 DIAGNOSIS — R42 VERTIGO: ICD-10-CM

## 2022-08-09 PROCEDURE — 1036F TOBACCO NON-USER: CPT | Performed by: NURSE PRACTITIONER

## 2022-08-09 PROCEDURE — 3017F COLORECTAL CA SCREEN DOC REV: CPT | Performed by: NURSE PRACTITIONER

## 2022-08-09 PROCEDURE — G8427 DOCREV CUR MEDS BY ELIG CLIN: HCPCS | Performed by: NURSE PRACTITIONER

## 2022-08-09 PROCEDURE — G8400 PT W/DXA NO RESULTS DOC: HCPCS | Performed by: NURSE PRACTITIONER

## 2022-08-09 PROCEDURE — 1123F ACP DISCUSS/DSCN MKR DOCD: CPT | Performed by: NURSE PRACTITIONER

## 2022-08-09 PROCEDURE — 1090F PRES/ABSN URINE INCON ASSESS: CPT | Performed by: NURSE PRACTITIONER

## 2022-08-09 PROCEDURE — G8420 CALC BMI NORM PARAMETERS: HCPCS | Performed by: NURSE PRACTITIONER

## 2022-08-09 PROCEDURE — 99214 OFFICE O/P EST MOD 30 MIN: CPT | Performed by: NURSE PRACTITIONER

## 2022-08-09 RX ORDER — DIAZEPAM 2 MG/1
2 TABLET ORAL DAILY
Qty: 30 TABLET | Refills: 2 | Status: SHIPPED
Start: 2022-08-09 | End: 2022-08-25 | Stop reason: ALTCHOICE

## 2022-08-09 NOTE — PROGRESS NOTES
[address] [facility]. Efra Garcia M.D., F.A.C.P. Tangela Torres, DNP, APRN, ACNS-BC  Jesus Cassidy. Gato Levine, MSN, APRN-FNP-C  Toni Lance, MSN, APRN-FNP-C  ARIADNA Garza, RENAE Dumont, MSN, APRN-FNP-C  [address] [place], [address] [address]  Phone: [phone]  Fax: [phone]       Monalisa Phoenix is a 78 y.o. right handed female     Patient referred for further evaluation of vertigo    She is accompanied by her niece today who is also serving as her     Patient speaks [facility] dialect and this office does not have Wi-Fi to use an iPad for translation    Patient referred for further evaluation of vertigo. Onset of dizziness began about 2 years ago. Niece notes that patient had eaten some seafood earlier for dinner and around midnight she got up and started experiencing some nausea but did not have any vomiting. Patient was complaining of the room spinning. Dizziness was positional but especially worse while lying down. Patient was complaining of a headache at that time. Patient now notes that she feels somewhat imbalanced when walking and still has a spinning sensation. She has no double vision or changes in her vision during these episodes. She does note some diaphoresis during the episode but no other symptoms. Patient does not have a history of headaches or family history of headaches. She has tried meclizine 12.5 mg which did work for her. She was not started on any new medications at that time. No hits to the head. Patient did have MRI of her brain that was ordered by ENT. MRI brain was negative for acute findings. Newell-Hallpike was performed by ENT as well which was questionable although with positional testing patient did have significant nystagmus left with vision. VNG testing results indicated a right-sided vestibular weakness. She did have vestibular therapy as well.     We tried her on nortriptyline nightly which helped her headaches but continues to have dizziness. We tried her on Valium 2 mg and she noted some relief but was only given 10 pills.       Sleep: does not sleep well gets around 5 hours a night goes to bed around 8 pm     No issues with chewing or swallowing  No chest pain or palpitations  No SOB  No vertigo, lightheadedness or loss of consciousness  No falls, tripping or stumbling  No incontinence of bowels or bladder  No itching or bruising appreciated  No numbness, tingling or focal arm/leg weakness    ROS is otherwise negative    Objective:     Vitals:    08/09/22 0820   BP: (!) 161/82   Site: Right Upper Arm   Pulse: 66   Temp: 98.8 °F (37.1 °C)   SpO2: 98%     General appearance: alert, appears stated age, cooperative and in no distress  Head: normocephalic, without obvious abnormality, atraumatic  Eyes: conjunctivae/corneas clear; no drainage  Neck: no carotid bruit, supple, symmetrical, trachea midline   Lungs: clear to auscultation bilaterally  Heart: regular rate and rhythm, S1, S2 normal  Abdomen: soft, non-tender; bowel sounds normal  Extremities: normal, atraumatic, no cyanosis or edema  Skin:  color, texture, turgor normal--no rashes or lesions      Mental Status: alert and oriented x 4    Appropriate attention/concentration  Intact fundus of knowledge      Speech: no dysarthria  Language: no aphasias    Cranial Nerves:  I: smell    II: visual acuity     II: visual fields Full    II: pupils TODD   III,VII: ptosis None   III,IV,VI: extraocular muscles  EOMI without nystagmus   V: mastication Normal   V: facial light touch sensation  Normal   V,VII: corneal reflex     VII: facial muscle function - upper  Normal   VII: facial muscle function - lower Normal   VIII: hearing Left decreased   IX: soft palate elevation  Normal   IX,X: gag reflex    XI: trapezius strength  5/5   XI: sternocleidomastoid strength 5/5   XI: neck extension strength  5/5   XII: tongue strength  Normal     Motor:  5/5

## 2022-08-15 ENCOUNTER — TELEPHONE (OUTPATIENT)
Dept: PULMONOLOGY | Age: 67
End: 2022-08-15

## 2022-08-15 NOTE — TELEPHONE ENCOUNTER
Call to pt and spoke to [facility] re: follow up in office with new provider Dr. Elsi Frazier.  Pt's son agreeable to follow up apt in office next Thursday [email or web address].

## 2022-08-16 DIAGNOSIS — I10 ESSENTIAL HYPERTENSION: ICD-10-CM

## 2022-08-17 RX ORDER — AMLODIPINE BESYLATE 5 MG/1
TABLET ORAL
Qty: 90 TABLET | Refills: 1 | OUTPATIENT
Start: 2022-08-17

## 2022-08-25 ENCOUNTER — OFFICE VISIT (OUTPATIENT)
Dept: PULMONOLOGY | Age: 67
End: 2022-08-25
Payer: COMMERCIAL

## 2022-08-25 VITALS
DIASTOLIC BLOOD PRESSURE: 74 MMHG | BODY MASS INDEX: 22.5 KG/M2 | WEIGHT: 127 LBS | SYSTOLIC BLOOD PRESSURE: 157 MMHG | OXYGEN SATURATION: 94 % | TEMPERATURE: 97.8 F | RESPIRATION RATE: 16 BRPM | HEIGHT: 63 IN | HEART RATE: 77 BPM

## 2022-08-25 DIAGNOSIS — Z60.3 IMMIGRANT WITH LANGUAGE DIFFICULTY: ICD-10-CM

## 2022-08-25 DIAGNOSIS — Z22.7 LTBI (LATENT TUBERCULOSIS INFECTION): ICD-10-CM

## 2022-08-25 DIAGNOSIS — R91.8 GROUND GLASS OPACITY PRESENT ON IMAGING OF LUNG: ICD-10-CM

## 2022-08-25 DIAGNOSIS — R05.3 CHRONIC COUGH: ICD-10-CM

## 2022-08-25 DIAGNOSIS — R76.11 POSITIVE TB TEST: ICD-10-CM

## 2022-08-25 DIAGNOSIS — J41.0 SIMPLE CHRONIC BRONCHITIS (HCC): ICD-10-CM

## 2022-08-25 DIAGNOSIS — J92.0 PLEURAL PLAQUE DUE TO ASBESTOS EXPOSURE: Primary | ICD-10-CM

## 2022-08-25 DIAGNOSIS — J98.11 ROUND ATELECTASIS: ICD-10-CM

## 2022-08-25 PROCEDURE — 1036F TOBACCO NON-USER: CPT | Performed by: INTERNAL MEDICINE

## 2022-08-25 PROCEDURE — 99213 OFFICE O/P EST LOW 20 MIN: CPT | Performed by: INTERNAL MEDICINE

## 2022-08-25 PROCEDURE — 99214 OFFICE O/P EST MOD 30 MIN: CPT | Performed by: INTERNAL MEDICINE

## 2022-08-25 PROCEDURE — 1123F ACP DISCUSS/DSCN MKR DOCD: CPT | Performed by: INTERNAL MEDICINE

## 2022-08-25 PROCEDURE — G8427 DOCREV CUR MEDS BY ELIG CLIN: HCPCS | Performed by: INTERNAL MEDICINE

## 2022-08-25 PROCEDURE — G8400 PT W/DXA NO RESULTS DOC: HCPCS | Performed by: INTERNAL MEDICINE

## 2022-08-25 PROCEDURE — G8420 CALC BMI NORM PARAMETERS: HCPCS | Performed by: INTERNAL MEDICINE

## 2022-08-25 PROCEDURE — 3023F SPIROM DOC REV: CPT | Performed by: INTERNAL MEDICINE

## 2022-08-25 PROCEDURE — 1090F PRES/ABSN URINE INCON ASSESS: CPT | Performed by: INTERNAL MEDICINE

## 2022-08-25 PROCEDURE — 3017F COLORECTAL CA SCREEN DOC REV: CPT | Performed by: INTERNAL MEDICINE

## 2022-08-25 RX ORDER — GUAIFENESIN 600 MG/1
600 TABLET, EXTENDED RELEASE ORAL 2 TIMES DAILY
Qty: 30 TABLET | Refills: 0 | Status: SHIPPED | OUTPATIENT
Start: 2022-08-25 | End: 2022-09-09

## 2022-08-25 SDOH — SOCIAL STABILITY - SOCIAL INSECURITY: ACCULTURATION DIFFICULTY: Z60.3

## 2022-08-25 NOTE — PROGRESS NOTES
[place]  Department of Pulmonary, Critical Care and Sleep Medicine  Dr. Alexandra Odom, Dr. Leonard Barrera, Dr. Dayton Mullen Note - Follow up      Assessment/Plan     Assessment & Plan     No problem-specific Assessment & Plan notes found for this encounter. Problem List Items Addressed This Visit          Respiratory    Pleural plaque due to asbestos exposure - Primary    Relevant Orders    CT CHEST WO CONTRAST       Other    Chronic cough    Immigrant with language difficulty    Positive TB test     Other Visit Diagnoses       Ground glass opacity present on imaging of lung        Relevant Orders    CT CHEST WO CONTRAST    Round atelectasis        Relevant Orders    CT CHEST WO CONTRAST    LTBI (latent tuberculosis infection)        Simple chronic bronchitis (HCC)        Relevant Medications    umeclidinium-vilanterol (ANORO ELLIPTA) 62.5-25 MCG/INH AEPB inhaler    guaiFENesin (MUCINEX) 600 MG extended release tablet               Plan:     Starting LABA/LAMA for chronic bronchitis  Starting scheduled guaifenesin  Advised to rinse mouth after each use. P.r.n. albuterol  Advised on proper inhaler technique, and adherence to prescribed inhalers. Her pulmonary/pleural nodules/proximal seems stable including her groundglass opacity in her left upper lobe. Offered PET-CT scan to evaluate her pulmonary patients to evaluate for malignancy/active tuberculosis -declined. Repeat CT chest in 1 year for nodule follow-up    Aspiration / GERD precautions  Head end of bed elevation. Maintain active and healthy lifestyle with weight reduction. COVID-19 precautions  Recommend yearly Influenza and appropriate pneumonia vaccinations. Declines pneumonia vaccinations. Follow up: Return in about 6 months (around 2/25/2023).     Delores Rivero MD MS  Pulmonary [place]  Dr. Alexandra Odom, Dr. Leonard Barrera,  [place] Bob    Orders Placed This Encounter   Procedures    CT CHEST WO CONTRAST     Standing Status:   Future     Standing Expiration Date:   8/25/2023     Order Specific Question:   Reason for exam:     Answer:   Pulmonary nodules         Immunization History   Administered Date(s) Administered    COVID-19, PFIZER PURPLE top, DILUTE for use, (age 15 y+), 30mcg/0.3mL 03/08/2021, 03/29/2021, 10/18/2021    PPD Test 11/21/2016       Subjective   Patient ID: Dennis Tolliver is a 75 y.o. female  Chief Complaint:   HPI: Patient is a 75 y.o. female is here for followup. History of positive PPD, positive T spot test, pleural plaques/nodules and groundglass opacities. 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  with significant asbestos exposure from history of working as a . Bronchoscopy in September 2020 with BAL & FNA lung nodule & EBUS FNA LN 7 cytology negative for malignancy. BAL AFB was also negative. Previous AFB cultures were also negative x 3 in July 2020. Patient is here today with her daughter. Declines official translation. She denies any shortness of breath however reports chronic cough with clear phlegm. States that this cough has been stable and has not worsened. Denies any postnasal drip or GERD/aspiration. Denies any fever, chills, night sweats or weight loss. She never received any treatment for tuberculosis or latent TB. CT Chest from August 2022 on personal review shows extensive bilateral pleural plaques/nodules with groundglass opacities in the TUNDE and RUL rounded atelectasis. Spirometry from 2019 with evidence of some obstruction. Smoking history: Never smoker.     FEV1   Date Value Ref Range Status   04/08/2019 1.96 L Final     FVC   Date Value Ref Range Status   04/08/2019 2.20 L Final     FEV1/FVC   Date Value Ref Range Status   04/08/2019 89 % Final       CT chest: 8/2/2022    Persistent extensive bilateral partially calcified pleural plaques without significant change likely due to previous asbestos exposure. A pleural based density in the right upper lobe is noted likely rounded atelectasis. Developing malignancy is less likely and close surveillance is recommended. Persistent focal ground-glass masslike density in the left upper lobe/lingula without significant change likely inflammatory. Low-grade malignancy may have a similar appearance. Continued close surveillance is recommended. Atelectasis lung bases. Labs: T spot positive from September 2019, IgE 24, reference range     ALLERGIES:No Known Allergies  SOCIAL HISTORY:   Social History     Tobacco Use    Smoking status: Never    Smokeless tobacco: Never   Vaping Use    Vaping Use: Never used   Substance Use Topics    Alcohol use: No    Drug use: No     MEDS:   Current Outpatient Medications   Medication Sig Dispense Refill    umeclidinium-vilanterol (ANORO ELLIPTA) 62.5-25 MCG/INH AEPB inhaler Inhale 1 puff into the lungs daily 1 each 1    guaiFENesin (MUCINEX) 600 MG extended release tablet Take 1 tablet by mouth 2 times daily for 15 days 30 tablet 0    amLODIPine (NORVASC) 5 MG tablet TAKE 1 TABLET BY MOUTH EVERY DAY 90 tablet 1    meclizine (ANTIVERT) 12.5 MG tablet Take 1 tablet by mouth daily as needed for Dizziness 20 tablet 1     No current facility-administered medications for this visit. Review of Systems: -ve other than specified above. Objective       Vitals:  BP: (!) 157/74, Heart Rate: 77, Resp: 16, Temp: 97.8 °F (36.6 °C), Temp Source: Infrared, SpO2: 94 %, Height: 5' 3\" (160 cm), Weight: 127 lb (57.6 kg)    BMI body mass index is 22.5 kg/m². Ideal Body Weight: Ideal body weight: 52.4 kg (115 lb 8.3 oz)  Adjusted ideal body weight: 54.5 kg (120 lb 1.8 oz)  ---------------  Physical Exam:    General: Alert and oriented x 3. No acute distress. Eyes:  Vision - grossly normal, PERRLA  HENT:  Head is atraumatic and normocephalic. Neck is supple, no jugular venous distention. Respiratory: Diminished breath sounds in bilateral bases with inspiratory crackles, respirations are nonlabored, breath sounds are equal.  Cardiovascular:  S1, S2 normal, regular rate and rhythm, no murmur, no pedal edema  Gastrointestinal:  Soft, nontender, nondistended. Normal bowel sounds. No organomegaly  Neurologic:  Awake and alert, cranial nerves 2-12 grossly intact, no focal motor or sensory deficits. Labs     CBC:   Lab Results   Component Value Date    WBC 8.1 03/26/2019    HGB 13.5 03/26/2019    HCT 41.9 03/26/2019    MCV 92.3 03/26/2019     03/26/2019     BMP:     Chemistry        Component Value Date/Time     (H) 09/27/2019 1433    K 4.8 09/27/2019 1433     09/27/2019 1433    CO2 24 09/27/2019 1433    BUN 14 10/14/2021 0827    CREATININE 0.7 10/14/2021 0827        Component Value Date/Time    CALCIUM 10.0 09/27/2019 1433    ALKPHOS 89 09/27/2019 1433    AST 16 09/27/2019 1433    ALT 11 09/27/2019 1433    BILITOT 0.5 09/27/2019 1433          LFTs:   Lab Results   Component Value Date    ALT 11 09/27/2019    AST 16 09/27/2019    ALKPHOS 89 09/27/2019    BILITOT 0.5 09/27/2019    PROT 8.6 (H) 09/27/2019     Coags:   Lab Results   Component Value Date    INR 1.1 12/14/2016       Imaging   Reviewed imaging studies personally and findings as below  CT CHEST WO CONTRAST    Result Date: 8/2/2022  EXAMINATION: CT OF THE CHEST WITHOUT CONTRAST 8/2/2022 10:48 am TECHNIQUE: CT of the chest was performed without the administration of intravenous contrast. Multiplanar reformatted images are provided for review. Automated exposure control, iterative reconstruction, and/or weight based adjustment of the mA/kV was utilized to reduce the radiation dose to as low as reasonably achievable.  COMPARISON: Previous examination 03/09/2021 and 08/31/2020 HISTORY: ORDERING SYSTEM PROVIDED HISTORY: Lung nodule TECHNOLOGIST PROVIDED HISTORY: ENB protocol Reason for exam:->Lung nodule What reading provider will be dictating this exam?->CRC FINDINGS: There is borderline cardiac size. The great vessels are normal.  Nonenlarged mediastinal lymph nodes are present. Trachea and major bronchi are patent. There is extensive partially calcified pleural plaques as before likely due to previous asbestos exposure. Developing malignancy is less likely. Previously noted ground-glass nodule in the lingula currently measures 2.3 x 2.7 cm without significant change. A rounded mass in the right upper lobe measuring 1.4 x 1.6 cm is noted likely rounded atelectasis without change. Developing malignancy is less likely. Minimal ground-glass densities are noted in the lung bases. The liver is of normal architecture. Persistent extensive bilateral partially calcified pleural plaques without significant change likely due to previous asbestos exposure. A pleural based density in the right upper lobe is noted likely rounded atelectasis. Developing malignancy is less likely and close surveillance is recommended. Persistent focal ground-glass masslike density in the left upper lobe/lingula without significant change likely inflammatory. Low-grade malignancy may have a similar appearance. Continued close surveillance is recommended. Atelectasis lung bases.

## 2022-08-25 NOTE — PROGRESS NOTES
1 year follow up in office today; reviewed most recent Chest CT with pt and dtr at today's visit. Pt and Dtr both decline use of Video  during visit. Nothing new for pt since last visit. Plan for Chest CT and new Anoro inhaler to be tried to see if it will help with cough. Mucinex script also ordered for pt to use. Follow up in office in 1 year and prn.

## 2022-09-07 DIAGNOSIS — I10 ESSENTIAL HYPERTENSION: ICD-10-CM

## 2022-09-07 RX ORDER — AMLODIPINE BESYLATE 5 MG/1
TABLET ORAL
Qty: 90 TABLET | Refills: 1 | OUTPATIENT
Start: 2022-09-07

## 2022-09-07 RX ORDER — AMLODIPINE BESYLATE 5 MG/1
TABLET ORAL
Qty: 27 TABLET | Refills: 0 | Status: SHIPPED
Start: 2022-09-07 | End: 2022-09-28 | Stop reason: SDUPTHER

## 2022-09-07 NOTE — TELEPHONE ENCOUNTER
Son called requesting refills for patient , pt has not been seen since 9/2021, I scheduled an appt , but pt needs bp meds. Med queued.

## 2022-09-28 ENCOUNTER — HOSPITAL ENCOUNTER (OUTPATIENT)
Age: 67
Discharge: HOME | End: 2022-09-28
Payer: COMMERCIAL

## 2022-09-28 ENCOUNTER — OFFICE VISIT (OUTPATIENT)
Dept: INTERNAL MEDICINE | Age: 67
End: 2022-09-28
Payer: COMMERCIAL

## 2022-09-28 VITALS
WEIGHT: 129.7 LBS | TEMPERATURE: 97.4 F | BODY MASS INDEX: 23.87 KG/M2 | SYSTOLIC BLOOD PRESSURE: 138 MMHG | HEIGHT: 62 IN | RESPIRATION RATE: 18 BRPM | HEART RATE: 72 BPM | DIASTOLIC BLOOD PRESSURE: 88 MMHG | OXYGEN SATURATION: 98 %

## 2022-09-28 DIAGNOSIS — Z13.820 SCREENING FOR OSTEOPOROSIS: ICD-10-CM

## 2022-09-28 DIAGNOSIS — I10 ESSENTIAL HYPERTENSION: ICD-10-CM

## 2022-09-28 DIAGNOSIS — Z00.00 HEALTH CARE MAINTENANCE: ICD-10-CM

## 2022-09-28 DIAGNOSIS — Z12.31 ENCOUNTER FOR SCREENING MAMMOGRAM FOR MALIGNANT NEOPLASM OF BREAST: Primary | ICD-10-CM

## 2022-09-28 DIAGNOSIS — R73.03 PRE-DIABETES: ICD-10-CM

## 2022-09-28 LAB
ALBUMIN SERPL-MCNC: 4.8 G/DL (ref 3.5–5.2)
ALP BLD-CCNC: 107 U/L (ref 35–104)
ALT SERPL-CCNC: 23 U/L (ref 0–32)
ANION GAP SERPL CALCULATED.3IONS-SCNC: 10 MMOL/L (ref 7–16)
AST SERPL-CCNC: 19 U/L (ref 0–31)
BASOPHILS ABSOLUTE: 0.05 E9/L (ref 0–0.2)
BASOPHILS RELATIVE PERCENT: 0.7 % (ref 0–2)
BILIRUB SERPL-MCNC: 0.5 MG/DL (ref 0–1.2)
BUN BLDV-MCNC: 10 MG/DL (ref 6–23)
CALCIUM SERPL-MCNC: 9.4 MG/DL (ref 8.6–10.2)
CHLORIDE BLD-SCNC: 101 MMOL/L (ref 98–107)
CHOLESTEROL, TOTAL: 242 MG/DL (ref 0–199)
CO2: 28 MMOL/L (ref 22–29)
CREAT SERPL-MCNC: 0.5 MG/DL (ref 0.5–1)
EOSINOPHILS ABSOLUTE: 0.17 E9/L (ref 0.05–0.5)
EOSINOPHILS RELATIVE PERCENT: 2.2 % (ref 0–6)
GFR AFRICAN AMERICAN: >60
GFR NON-AFRICAN AMERICAN: >60 ML/MIN/1.73
GLUCOSE BLD-MCNC: 100 MG/DL (ref 74–99)
HBA1C MFR BLD: 5.9 %
HCT VFR BLD CALC: 47.3 % (ref 34–48)
HDLC SERPL-MCNC: 37 MG/DL
HEMOGLOBIN: 15.1 G/DL (ref 11.5–15.5)
IMMATURE GRANULOCYTES #: 0.03 E9/L
IMMATURE GRANULOCYTES %: 0.4 % (ref 0–5)
LDL CHOLESTEROL CALCULATED: ABNORMAL MG/DL (ref 0–99)
LYMPHOCYTES ABSOLUTE: 3 E9/L (ref 1.5–4)
LYMPHOCYTES RELATIVE PERCENT: 39 % (ref 20–42)
MCH RBC QN AUTO: 29.9 PG (ref 26–35)
MCHC RBC AUTO-ENTMCNC: 31.9 % (ref 32–34.5)
MCV RBC AUTO: 93.7 FL (ref 80–99.9)
MONOCYTES ABSOLUTE: 0.46 E9/L (ref 0.1–0.95)
MONOCYTES RELATIVE PERCENT: 6 % (ref 2–12)
NEUTROPHILS ABSOLUTE: 3.98 E9/L (ref 1.8–7.3)
NEUTROPHILS RELATIVE PERCENT: 51.7 % (ref 43–80)
PDW BLD-RTO: 13.7 FL (ref 11.5–15)
PLATELET # BLD: 263 E9/L (ref 130–450)
PMV BLD AUTO: 11.2 FL (ref 7–12)
POTASSIUM SERPL-SCNC: 4.8 MMOL/L (ref 3.5–5)
RBC # BLD: 5.05 E12/L (ref 3.5–5.5)
SODIUM BLD-SCNC: 139 MMOL/L (ref 132–146)
TOTAL PROTEIN: 8 G/DL (ref 6.4–8.3)
TRIGL SERPL-MCNC: 866 MG/DL (ref 0–149)
VLDLC SERPL CALC-MCNC: ABNORMAL MG/DL
WBC # BLD: 7.7 E9/L (ref 4.5–11.5)

## 2022-09-28 PROCEDURE — G8427 DOCREV CUR MEDS BY ELIG CLIN: HCPCS | Performed by: INTERNAL MEDICINE

## 2022-09-28 PROCEDURE — G8420 CALC BMI NORM PARAMETERS: HCPCS | Performed by: INTERNAL MEDICINE

## 2022-09-28 PROCEDURE — 1123F ACP DISCUSS/DSCN MKR DOCD: CPT | Performed by: INTERNAL MEDICINE

## 2022-09-28 PROCEDURE — 99213 OFFICE O/P EST LOW 20 MIN: CPT | Performed by: INTERNAL MEDICINE

## 2022-09-28 PROCEDURE — 80061 LIPID PANEL: CPT

## 2022-09-28 PROCEDURE — 36415 COLL VENOUS BLD VENIPUNCTURE: CPT

## 2022-09-28 PROCEDURE — 83036 HEMOGLOBIN GLYCOSYLATED A1C: CPT | Performed by: INTERNAL MEDICINE

## 2022-09-28 PROCEDURE — 1036F TOBACCO NON-USER: CPT | Performed by: INTERNAL MEDICINE

## 2022-09-28 PROCEDURE — 85025 COMPLETE CBC W/AUTO DIFF WBC: CPT

## 2022-09-28 PROCEDURE — 3017F COLORECTAL CA SCREEN DOC REV: CPT | Performed by: INTERNAL MEDICINE

## 2022-09-28 PROCEDURE — 99212 OFFICE O/P EST SF 10 MIN: CPT | Performed by: INTERNAL MEDICINE

## 2022-09-28 PROCEDURE — G8400 PT W/DXA NO RESULTS DOC: HCPCS | Performed by: INTERNAL MEDICINE

## 2022-09-28 PROCEDURE — 80053 COMPREHEN METABOLIC PANEL: CPT

## 2022-09-28 PROCEDURE — 1090F PRES/ABSN URINE INCON ASSESS: CPT | Performed by: INTERNAL MEDICINE

## 2022-09-28 RX ORDER — HYDROCHLOROTHIAZIDE 12.5 MG/1
12.5 CAPSULE, GELATIN COATED ORAL EVERY MORNING
Qty: 30 CAPSULE | Refills: 5 | Status: SHIPPED | OUTPATIENT
Start: 2022-09-28

## 2022-09-28 RX ORDER — AMLODIPINE BESYLATE 5 MG/1
TABLET ORAL
Qty: 90 TABLET | Refills: 1 | Status: SHIPPED | OUTPATIENT
Start: 2022-09-28

## 2022-09-28 SDOH — ECONOMIC STABILITY: INCOME INSECURITY: IN THE LAST 12 MONTHS, WAS THERE A TIME WHEN YOU WERE NOT ABLE TO PAY THE MORTGAGE OR RENT ON TIME?: NO

## 2022-09-28 SDOH — ECONOMIC STABILITY: FOOD INSECURITY: WITHIN THE PAST 12 MONTHS, THE FOOD YOU BOUGHT JUST DIDN'T LAST AND YOU DIDN'T HAVE MONEY TO GET MORE.: NEVER TRUE

## 2022-09-28 SDOH — ECONOMIC STABILITY: FOOD INSECURITY: WITHIN THE PAST 12 MONTHS, YOU WORRIED THAT YOUR FOOD WOULD RUN OUT BEFORE YOU GOT MONEY TO BUY MORE.: NEVER TRUE

## 2022-09-28 SDOH — ECONOMIC STABILITY: HOUSING INSECURITY
IN THE LAST 12 MONTHS, WAS THERE A TIME WHEN YOU DID NOT HAVE A STEADY PLACE TO SLEEP OR SLEPT IN A SHELTER (INCLUDING NOW)?: NO

## 2022-09-28 SDOH — ECONOMIC STABILITY: TRANSPORTATION INSECURITY
IN THE PAST 12 MONTHS, HAS THE LACK OF TRANSPORTATION KEPT YOU FROM MEDICAL APPOINTMENTS OR FROM GETTING MEDICATIONS?: NO

## 2022-09-28 SDOH — ECONOMIC STABILITY: TRANSPORTATION INSECURITY
IN THE PAST 12 MONTHS, HAS LACK OF TRANSPORTATION KEPT YOU FROM MEETINGS, WORK, OR FROM GETTING THINGS NEEDED FOR DAILY LIVING?: NO

## 2022-09-28 SDOH — ECONOMIC STABILITY: HOUSING INSECURITY: IN THE LAST 12 MONTHS, HOW MANY PLACES HAVE YOU LIVED?: 1

## 2022-09-28 ASSESSMENT — ENCOUNTER SYMPTOMS
CONSTIPATION: 0
NAUSEA: 0
SINUS PAIN: 0
ABDOMINAL PAIN: 0
DIARRHEA: 0
VOMITING: 0
BACK PAIN: 0
SHORTNESS OF BREATH: 0
BLOOD IN STOOL: 0

## 2022-09-28 ASSESSMENT — LIFESTYLE VARIABLES
HOW OFTEN DO YOU HAVE A DRINK CONTAINING ALCOHOL: NEVER
HOW MANY STANDARD DRINKS CONTAINING ALCOHOL DO YOU HAVE ON A TYPICAL DAY: PATIENT DOES NOT DRINK

## 2022-09-28 ASSESSMENT — PATIENT HEALTH QUESTIONNAIRE - PHQ9
2. FEELING DOWN, DEPRESSED OR HOPELESS: 0
SUM OF ALL RESPONSES TO PHQ QUESTIONS 1-9: 0
SUM OF ALL RESPONSES TO PHQ9 QUESTIONS 1 & 2: 0
1. LITTLE INTEREST OR PLEASURE IN DOING THINGS: 0

## 2022-09-28 ASSESSMENT — SOCIAL DETERMINANTS OF HEALTH (SDOH): HOW HARD IS IT FOR YOU TO PAY FOR THE VERY BASICS LIKE FOOD, HOUSING, MEDICAL CARE, AND HEATING?: NOT HARD AT ALL

## 2022-09-28 NOTE — PATIENT INSTRUCTIONS
Please start taking hydrochlorothiazide 12.5 mg daily for blood pressure control. Please continue amlodipine. Please complete blood tests, mammogram and bone density scan.

## 2022-09-28 NOTE — PROGRESS NOTES
[facility]  Internal Medicine Clinic    Attending Physician Statement:  Patricia Bautista M.D., F.A.C.P. I have discussed the case, including pertinent history and exam findings with the resident. I have seen and examined the patient and the key elements of the encounter have been performed by me. I agree with the assessment, plan and orders as documented by the resident. Patient is seen for fu visit today. Last office notes reviewed, relative labs and imaging. HTN not well controlled  Prn use clonidine  Consider adding hctz  Pleural plaques, restrictive lung +asbestosis- stable  Vertigo stable, workup suspected periopheral +meclizine  20min  Remainder of medical problems as per resident note.

## 2022-09-28 NOTE — PROGRESS NOTES
[facility] Internal Medicine      SUBJECTIVE:  Ángel Menard (:  1955) is a 75 y.o. female here for evaluation of the following chief complaint(s):  Hypertension and Dizziness    PMH: HTN on amlodipine 5 mg and clonidine PRN, vertigo on meclizine 12.5 mg daily, restrictive lung disease, who presented to [facility] clinic for routine follow up on medical issues. Patient was last seen in our clinic 2021. HTN  On amlodipine 5 mg (increased on 2021). Clonidine 0.1 mg BID PRN initiated on 2020  141/82 mmHg and 138/88 mmHg in office today. Home , may up to 150-160 once a month. Patient takes clonidine as needed. Denies headache, chest pain, SOB, palpitations, orthopnea, PND, leg swelling  Add HCTZ 12.5 mg daily today    Pre-DM  A1c 5.9% (2022) << 5.7% (2019)  Lifestyle modification counseled      Chronic bronchitis  Never smoker  umeclidinium-vilanterol (ANORO ELLIPTA) 62.5-25 MCG/INH AEPB inhaler  Chronic cough with white sputum, no change from baseline  Follow Dr. Yaima Gonzalez (2022)    Pleural plaques   Hx of Asbestosis exposure. Never smoker  Declined PET-CT scan. Repeat CT in 1 year  Cough productive for clear suptum. No chest pain, hemoptysis, weight loss, fever, night sweat  Positive TB test.   Biopsy -ve for malignancy and culture -ve for TB in   Follow Dr. Yaima Gonzalez (2022)    CT chest WO contrast 2022  Persistent extensive bilateral partially calcified pleural plaques without   significant change likely due to previous asbestos exposure. A pleural based   density in the right upper lobe is noted likely rounded atelectasis. Developing malignancy is less likely and close surveillance is recommended. Persistent focal ground-glass masslike density in the left upper lobe/lingula   without significant change likely inflammatory. Low-grade malignancy may   have a similar appearance. Continued close surveillance is recommended. Current Outpatient Medications on File Prior to Visit   Medication Sig Dispense Refill    umeclidinium-vilanterol (ANORO ELLIPTA) 62.5-25 MCG/INH AEPB inhaler Inhale 1 puff into the lungs daily 1 each 1    meclizine (ANTIVERT) 12.5 MG tablet Take 1 tablet by mouth daily as needed for Dizziness 20 tablet 1     No current facility-administered medications on file prior to visit. OBJECTIVE:    VS:   Vitals:    09/28/22 1257 09/28/22 1303 09/28/22 1304   BP: (!) 142/81 (!) 141/82 138/88   Site: Right Upper Arm Right Upper Arm Right Upper Arm   Position: Supine Sitting Standing   Cuff Size: Medium Adult Medium Adult Medium Adult   Pulse: 74 72 72   Resp: 18 18 18   Temp: 97.4 °F (36.3 °C)     TempSrc: Temporal     SpO2: 98%     Weight: 129 lb 11.2 oz (58.8 kg)     Height: 5' 2\" (1.575 m)       Physical Exam  Vitals reviewed. Constitutional:       General: She is not in acute distress. Appearance: Normal appearance. HENT:      Head: Normocephalic and atraumatic. Nose: No rhinorrhea. Mouth/Throat:      Mouth: Mucous membranes are moist.      Pharynx: Oropharynx is clear. Eyes:      Pupils: Pupils are equal, round, and reactive to light. Neck:      Vascular: No carotid bruit. Cardiovascular:      Rate and Rhythm: Normal rate and regular rhythm. Pulses: Normal pulses. Heart sounds: Normal heart sounds. No murmur heard. No friction rub. No gallop. Pulmonary:      Effort: Pulmonary effort is normal.      Breath sounds: No wheezing or rales. Comments: Rhonchi noted bilateral lower lungs  Abdominal:      General: Bowel sounds are normal.      Palpations: Abdomen is soft. Tenderness: There is no abdominal tenderness. Musculoskeletal:         General: No swelling, tenderness or deformity. Cervical back: Neck supple. No tenderness. Right lower leg: No edema. Left lower leg: No edema. Skin:     General: Skin is warm and dry.    Neurological:      General: No focal deficit present. Mental Status: She is alert. Cranial Nerves: No cranial nerve deficit. Psychiatric:         Mood and Affect: Mood normal.          ASSESSMENT/PLAN:  1. Encounter for screening mammogram for malignant neoplasm of breast  -     YAQUELIN CASTILLO DIGITAL SCREEN BILATERAL PER PROTOCOL; Future  2. Essential hypertension  -     amLODIPine (NORVASC) 5 MG tablet; TAKE 1 TABLET BY MOUTH EVERY DAY, Disp-90 tablet, R-1Normal  -     hydroCHLOROthiazide (MICROZIDE) 12.5 MG capsule; Take 1 capsule by mouth every morning, Disp-30 capsule, R-5Normal  3. Pre-diabetes  -     POCT glycosylated hemoglobin (Hb A1C)  4. Screening for osteoporosis  -     DEXA BONE DENSITY AXIAL SKELETON; Future  5. Health care maintenance  -     CBC with Auto Differential; Future  -     LIPID PANEL; Future  -     Comprehensive Metabolic Panel; Future     RTC:  Return in about 3 months (around 12/28/2022) for Follow up. I have reviewed my findings and recommendations with Matthew Galloway and Dr. Jacques Hammonds.     Nestor Best MD   9/28/2022 4:30 PM

## 2022-10-21 DIAGNOSIS — J41.0 SIMPLE CHRONIC BRONCHITIS (HCC): ICD-10-CM

## 2022-12-27 DIAGNOSIS — I10 ESSENTIAL HYPERTENSION: ICD-10-CM

## 2022-12-27 RX ORDER — AMLODIPINE BESYLATE 5 MG/1
TABLET ORAL
Qty: 90 TABLET | Refills: 1 | Status: SHIPPED | OUTPATIENT
Start: 2022-12-27

## 2022-12-27 RX ORDER — HYDROCHLOROTHIAZIDE 12.5 MG/1
12.5 CAPSULE, GELATIN COATED ORAL EVERY MORNING
Qty: 90 CAPSULE | Refills: 1 | Status: SHIPPED | OUTPATIENT
Start: 2022-12-27

## 2022-12-27 NOTE — TELEPHONE ENCOUNTER
Last Appointment:  9/28/2022  Future Appointments   Date Time Provider García Janeth   1/9/2023  8:30 AM Jluis Mcdonough MD [facility]   1/10/2023  9:00 AM FLAVIO JAMISON RM 1 FLAVIO KATHLEEN [facility]

## 2023-01-10 ENCOUNTER — HOSPITAL ENCOUNTER (OUTPATIENT)
Dept: GENERAL RADIOLOGY | Age: 68
Discharge: HOME | End: 2023-01-12
Payer: COMMERCIAL

## 2023-01-10 DIAGNOSIS — Z12.31 ENCOUNTER FOR SCREENING MAMMOGRAM FOR MALIGNANT NEOPLASM OF BREAST: ICD-10-CM

## 2023-01-10 PROCEDURE — 77067 SCR MAMMO BI INCL CAD: CPT

## 2023-02-22 ENCOUNTER — TELEPHONE (OUTPATIENT)
Dept: INTERNAL MEDICINE | Age: 68
End: 2023-02-22

## 2023-02-22 NOTE — TELEPHONE ENCOUNTER
----- Message from Elina Jimenez sent at 2/22/2023  3:27 PM EST -----  Subject: Appointment Request    Reason for Call: Established Patient Appointment needed: Routine Physical   Exam    QUESTIONS    Reason for appointment request? Available appointments did not meet   patient need     Additional Information for Provider? Patient would like a physical, just   lost her , and has asbestos in home.  Please call to schedule   ---------------------------------------------------------------------------  --------------  Dru CLEMENS  [phone]; OK to leave message on voicemail  ---------------------------------------------------------------------------  --------------  SCRIPT ANSWERS  COVID Screen: Lala Fernandez

## 2023-02-27 ENCOUNTER — OFFICE VISIT (OUTPATIENT)
Dept: INTERNAL MEDICINE | Age: 68
End: 2023-02-27
Payer: COMMERCIAL

## 2023-02-27 VITALS
HEART RATE: 83 BPM | OXYGEN SATURATION: 98 % | DIASTOLIC BLOOD PRESSURE: 85 MMHG | BODY MASS INDEX: 22.32 KG/M2 | RESPIRATION RATE: 18 BRPM | HEIGHT: 63 IN | SYSTOLIC BLOOD PRESSURE: 148 MMHG | WEIGHT: 126 LBS | TEMPERATURE: 97.6 F

## 2023-02-27 DIAGNOSIS — Z63.4 BEREAVEMENT: Primary | ICD-10-CM

## 2023-02-27 DIAGNOSIS — I10 ESSENTIAL HYPERTENSION: ICD-10-CM

## 2023-02-27 DIAGNOSIS — F32.9 REACTIVE DEPRESSION: ICD-10-CM

## 2023-02-27 DIAGNOSIS — J41.0 SIMPLE CHRONIC BRONCHITIS (HCC): ICD-10-CM

## 2023-02-27 DIAGNOSIS — H53.8 BLURRY VISION: ICD-10-CM

## 2023-02-27 DIAGNOSIS — E78.5 HYPERLIPIDEMIA, UNSPECIFIED HYPERLIPIDEMIA TYPE: ICD-10-CM

## 2023-02-27 PROCEDURE — G8400 PT W/DXA NO RESULTS DOC: HCPCS

## 2023-02-27 PROCEDURE — 99212 OFFICE O/P EST SF 10 MIN: CPT

## 2023-02-27 PROCEDURE — 3017F COLORECTAL CA SCREEN DOC REV: CPT

## 2023-02-27 PROCEDURE — G8420 CALC BMI NORM PARAMETERS: HCPCS

## 2023-02-27 PROCEDURE — G8427 DOCREV CUR MEDS BY ELIG CLIN: HCPCS

## 2023-02-27 PROCEDURE — 99214 OFFICE O/P EST MOD 30 MIN: CPT

## 2023-02-27 PROCEDURE — G8484 FLU IMMUNIZE NO ADMIN: HCPCS

## 2023-02-27 PROCEDURE — 3074F SYST BP LT 130 MM HG: CPT

## 2023-02-27 PROCEDURE — 1036F TOBACCO NON-USER: CPT

## 2023-02-27 PROCEDURE — 3023F SPIROM DOC REV: CPT

## 2023-02-27 PROCEDURE — 1123F ACP DISCUSS/DSCN MKR DOCD: CPT

## 2023-02-27 PROCEDURE — 3078F DIAST BP <80 MM HG: CPT

## 2023-02-27 PROCEDURE — 1090F PRES/ABSN URINE INCON ASSESS: CPT

## 2023-02-27 RX ORDER — MECLIZINE HCL 12.5 MG/1
12.5 TABLET ORAL DAILY PRN
Qty: 20 TABLET | Refills: 1 | Status: SHIPPED | OUTPATIENT
Start: 2023-02-27

## 2023-02-27 RX ORDER — SERTRALINE HYDROCHLORIDE 25 MG/1
25 TABLET, FILM COATED ORAL DAILY
Qty: 90 TABLET | Refills: 0 | Status: SHIPPED | OUTPATIENT
Start: 2023-02-27

## 2023-02-27 RX ORDER — ATORVASTATIN CALCIUM 20 MG/1
20 TABLET, FILM COATED ORAL DAILY
Qty: 90 TABLET | Refills: 1 | Status: SHIPPED | OUTPATIENT
Start: 2023-02-27

## 2023-02-27 RX ORDER — AMLODIPINE BESYLATE 5 MG/1
TABLET ORAL
Qty: 90 TABLET | Refills: 1 | Status: SHIPPED | OUTPATIENT
Start: 2023-02-27

## 2023-02-27 RX ORDER — LANOLIN ALCOHOL/MO/W.PET/CERES
3 CREAM (GRAM) TOPICAL NIGHTLY PRN
Qty: 30 TABLET | Refills: 1 | Status: SHIPPED | OUTPATIENT
Start: 2023-02-27 | End: 2023-03-29

## 2023-02-27 RX ORDER — HYDROCHLOROTHIAZIDE 12.5 MG/1
12.5 CAPSULE, GELATIN COATED ORAL EVERY MORNING
Qty: 90 CAPSULE | Refills: 1 | Status: SHIPPED | OUTPATIENT
Start: 2023-02-27

## 2023-02-27 SDOH — SOCIAL STABILITY - SOCIAL INSECURITY: DISSAPEARANCE AND DEATH OF FAMILY MEMBER: Z63.4

## 2023-02-27 ASSESSMENT — ENCOUNTER SYMPTOMS
NAUSEA: 0
DIARRHEA: 0
EYE PAIN: 0
CHEST TIGHTNESS: 0
ABDOMINAL DISTENTION: 0
EYE DISCHARGE: 0
BLOOD IN STOOL: 0
WHEEZING: 0
SHORTNESS OF BREATH: 0
STRIDOR: 0

## 2023-02-27 ASSESSMENT — PATIENT HEALTH QUESTIONNAIRE - PHQ9
4. FEELING TIRED OR HAVING LITTLE ENERGY: 3
1. LITTLE INTEREST OR PLEASURE IN DOING THINGS: 2
SUM OF ALL RESPONSES TO PHQ QUESTIONS 1-9: 18
2. FEELING DOWN, DEPRESSED OR HOPELESS: 1
SUM OF ALL RESPONSES TO PHQ QUESTIONS 1-9: 18
7. TROUBLE CONCENTRATING ON THINGS, SUCH AS READING THE NEWSPAPER OR WATCHING TELEVISION: 3
6. FEELING BAD ABOUT YOURSELF - OR THAT YOU ARE A FAILURE OR HAVE LET YOURSELF OR YOUR FAMILY DOWN: 3
3. TROUBLE FALLING OR STAYING ASLEEP: 3
SUM OF ALL RESPONSES TO PHQ QUESTIONS 1-9: 18
SUM OF ALL RESPONSES TO PHQ9 QUESTIONS 1 & 2: 3
SUM OF ALL RESPONSES TO PHQ QUESTIONS 1-9: 18
5. POOR APPETITE OR OVEREATING: 3
8. MOVING OR SPEAKING SO SLOWLY THAT OTHER PEOPLE COULD HAVE NOTICED. OR THE OPPOSITE, BEING SO FIGETY OR RESTLESS THAT YOU HAVE BEEN MOVING AROUND A LOT MORE THAN USUAL: 0
10. IF YOU CHECKED OFF ANY PROBLEMS, HOW DIFFICULT HAVE THESE PROBLEMS MADE IT FOR YOU TO DO YOUR WORK, TAKE CARE OF THINGS AT HOME, OR GET ALONG WITH OTHER PEOPLE: 1
9. THOUGHTS THAT YOU WOULD BE BETTER OFF DEAD, OR OF HURTING YOURSELF: 0

## 2023-02-27 NOTE — PATIENT INSTRUCTIONS
Major Depressive Disorder:  Start on Lexapro 25mg   Follow-up with Melani Munguia for therapy  Melatonin 3mg for sleep    Blood Pressure (HTN)  Norvasc 5mg 1 tab daily  HCTZ 12.5mg daily    Mixed hyperlipidemia   Atorvastatin 20mg daily  Recheck lipid panel during next visit in 4/27/2023    Vertigo - BPPV  Refill Anti-vert 12.5 mg as needed for dizziness    Blurry Vision  Follow-up with Ophthalmology (Dr. Sandra Munguia) for vision check    Please follow-up with us 4/27/2023 for your blood pressure and your hyperlipidemia

## 2023-02-27 NOTE — PROGRESS NOTES
[facility] Internal Medicine      SUBJECTIVE:  Simone Antony (:  1955) is a 75 y.o. female here for evaluation of the following chief complaint(s):  Follow-up ( passed a couple weeks ago, wants check up for travel, states headaches back of head. Wants left eyelid looked at, states dizziness on and off)      Previous Visit Imp Points:   Hypertension -     HPI:   Ms. Simone Antony is a 58-year-old female medical history significant for chronic bronchitis due to asbestos, BPPV, essential hypertension. Patient is here for blood pressure med refill and recheck. Blood pressure is elevated on clinic visit 148/85. She has not been taking her hydrochlorothiazide. Has not been measuring blood pressure at home due to recent passing of her . Bereavement (major depressive disorder) -PHQ 2 score 5, PHQ 9 score 18. Experiences insomnia, depression, guilt, lack of concentration, loss of appetite. Denies any thoughts of suicidal ideation, denies any thoughts of homicidal ideation. States that her family is her support system. States that her 's passing was sudden - passed away from traumatic fall himself on the head. Essential HTN - Has not measured blood pressure since 's death. No pound headache, nausea, chest pain, congestion. Has not been take HCTZ because patient has not been drinking water. Discussed with patient regarding importance of hydration and medication compliance, patient is amenable to doing both. Hyperlipidemia - previous Lipid panel showed mixed HLD. Patient is to start on 20mg lipitor. Lipid panel to be scheduled measured after she started the Lipitor and to have her next visit    Bronchitis-patient has a history of chronic bronchitis secondary to asbestos. Patient endorses congestion, yellow sputum production. Ambulatory SPO2 95% resting SPO2 97%.       Med Refills:   -Norvasc 5 mg  -HCTZ 12.5 mg    Key points to note:  -Recent loss of , currently experiencing major depression. Following up with therapy and medication treatment   -3 mg melatonin for sleep   -Sertraline 25 mg  -Blood pressure continue with Norvasc plus HCTZ  -Blurry vision: Eval with ophthalmology (Dr. Osmar Smith)  -Previous lipid panel demonstrated mixed hyperlipidemia - started patient on 20 mg Lipitor  -History of BPPV -Antivert for control    Review of Systems   Constitutional:  Negative for fatigue and fever. HENT:  Negative for congestion and ear discharge. Eyes:  Negative for pain and discharge. Respiratory:  Negative for chest tightness, shortness of breath, wheezing and stridor. Cardiovascular:  Negative for chest pain, palpitations and leg swelling. Gastrointestinal:  Negative for abdominal distention, blood in stool, diarrhea and nausea. Genitourinary:  Negative for difficulty urinating, flank pain and urgency. Musculoskeletal:  Negative for arthralgias and neck pain. Neurological:  Positive for dizziness. Negative for syncope, speech difficulty and light-headedness. Psychiatric/Behavioral:  Positive for sleep disturbance. Negative for suicidal ideas. PMHx:  has a past medical history of Hypertension and TB (pulmonary tuberculosis). No Known Allergies     PSHx:  has a past surgical history that includes LEEP (7/23/2014); Hysterectomy; bronchoscopy (12/14/2016); bronchoscopy (N/A, 9/15/2020); bronchoscopy (9/15/2020); bronchoscopy (9/15/2020); bronchoscopy (9/15/2020); bronchoscopy (9/15/2020); and Colonoscopy (N/A, 1/26/2021).      Social Hx:   Social History       Tobacco History       Smoking Status  Never      Smokeless Tobacco Use  Never              Alcohol History       Alcohol Use Status  No              Drug Use       Drug Use Status  No              Sexual Activity       Sexually Active  Not Currently                     Fam Hx:   Family History   Problem Relation Age of Onset    Diabetes Mother Diabetes Father               Current Outpatient Medications on File Prior to Visit   Medication Sig Dispense Refill    ANORO ELLIPTA 62.5-25 MCG/INH AEPB inhaler INHALE 1 PUFF BY MOUTH INTO THE LUNGS (Patient not taking: Reported on 2/27/2023) 60 each 6     No current facility-administered medications on file prior to visit. OBJECTIVE:    VS:   Vitals:    02/27/23 1005 02/27/23 1017 02/27/23 1018 02/27/23 1019   BP: 138/68 (!) 171/79 (!) 157/76 (!) 148/85   Site: Left Upper Arm Left Upper Arm Left Upper Arm Left Upper Arm   Position: Sitting Supine Sitting Standing   Cuff Size: Medium Adult Medium Adult Medium Adult Medium Adult   Pulse:   83    Resp:       Temp:       TempSrc:       SpO2:       Weight:       Height:         Physical Exam  Constitutional:       Appearance: Normal appearance. HENT:      Head: Normocephalic and atraumatic. Nose: Nose normal.      Mouth/Throat:      Mouth: Mucous membranes are moist.   Eyes:      Extraocular Movements: Extraocular movements intact. Cardiovascular:      Rate and Rhythm: Normal rate and regular rhythm. Pulses: Normal pulses. Heart sounds: Normal heart sounds. Pulmonary:      Effort: Pulmonary effort is normal.      Breath sounds: Normal breath sounds. Abdominal:      General: Abdomen is flat. Musculoskeletal:         General: Normal range of motion. Neurological:      General: No focal deficit present. Mental Status: She is alert. Psychiatric:         Attention and Perception: Attention and perception normal.         Mood and Affect: Mood is depressed. Affect is tearful. Speech: Speech normal.         Behavior: Behavior normal.         Thought Content: Thought content does not include homicidal or suicidal plan. ASSESSMENT/PLAN:  1. Bereavement  -     melatonin (RA MELATONIN) 3 MG TABS tablet;  Take 1 tablet by mouth nightly as needed (for sleep), Disp-30 tablet, R-1Normal  -     Herminia Richards, [place], [address], Counseling Services, [facility] Only  -     sertraline (ZOLOFT) 25 MG tablet; Take 1 tablet by mouth daily, Disp-90 tablet, R-0Normal  2. Essential hypertension  -     hydroCHLOROthiazide (MICROZIDE) 12.5 MG capsule; Take 1 capsule by mouth every morning, Disp-90 capsule, R-1Normal  -     amLODIPine (NORVASC) 5 MG tablet; TAKE 1 TABLET BY MOUTH EVERY DAY, Disp-90 tablet, R-1Normal  -     atorvastatin (LIPITOR) 20 MG tablet; Take 1 tablet by mouth daily, Disp-90 tablet, R-1Normal  3. Simple chronic bronchitis (HCC)  4. Reactive depression  -     sertraline (ZOLOFT) 25 MG tablet; Take 1 tablet by mouth daily, Disp-90 tablet, R-0Normal  5. Hyperlipidemia, unspecified hyperlipidemia type  -     atorvastatin (LIPITOR) 20 MG tablet; Take 1 tablet by mouth daily, Disp-90 tablet, R-1Normal  -     LIPID PANEL; Future  6. Blurry vision  -     Bairon Anthony MD, Ophthalmology, [place] ([facility])       RTC:  Return in about 2 months (around 4/27/2023) for Blood Pressure, Lipid Check.      I have reviewed my findings and recommendations with Nahum Jordan and Dr. Lamar.    Saturnino Zaidi MD   2/27/2023 3:40 PM

## 2023-02-27 NOTE — PROGRESS NOTES
[address]  [facility]    Attending Physician Statement  I have discussed the case, including pertinent history and exam findings with the resident physician. I have seen and examined the patient and the key elements of the encounter have been performed by me. I agree with the assessment, plan and orders as documented by the resident. I have reviewed all pertinent PMHx, PSHx, FamHx, SocialHx, medications, and allergies and updated history as appropriate. Patient here for routine follow up of medical problems. HTN - has only been taking amlodipine. HCTZ added last visit but patient has not been taking medication and she has not been eating well, unable to measure BP at home. Bereavement. PHQ9: 18. No SI/HI. Good support system. Poor sleep, appetite, concentration. SSRI started today. Referral to [facility]  ASCVD 16 - start moderate intensity statin. Rpt lipid panel. Patient planning to visit [facility] in June. PCP ff up in April. Remainder of medical problems as per resident note. Rocio Forrester MD  2/27/2023 11:53 AM

## 2023-03-06 ENCOUNTER — OFFICE VISIT (OUTPATIENT)
Dept: NEUROLOGY | Age: 68
End: 2023-03-06
Payer: COMMERCIAL

## 2023-03-06 VITALS
HEART RATE: 72 BPM | BODY MASS INDEX: 23.05 KG/M2 | TEMPERATURE: 98.2 F | HEIGHT: 64 IN | SYSTOLIC BLOOD PRESSURE: 149 MMHG | OXYGEN SATURATION: 94 % | WEIGHT: 135 LBS | DIASTOLIC BLOOD PRESSURE: 80 MMHG

## 2023-03-06 DIAGNOSIS — R42 VERTIGO: ICD-10-CM

## 2023-03-06 PROCEDURE — 3017F COLORECTAL CA SCREEN DOC REV: CPT | Performed by: NURSE PRACTITIONER

## 2023-03-06 PROCEDURE — 1123F ACP DISCUSS/DSCN MKR DOCD: CPT | Performed by: NURSE PRACTITIONER

## 2023-03-06 PROCEDURE — G8400 PT W/DXA NO RESULTS DOC: HCPCS | Performed by: NURSE PRACTITIONER

## 2023-03-06 PROCEDURE — G8420 CALC BMI NORM PARAMETERS: HCPCS | Performed by: NURSE PRACTITIONER

## 2023-03-06 PROCEDURE — G8484 FLU IMMUNIZE NO ADMIN: HCPCS | Performed by: NURSE PRACTITIONER

## 2023-03-06 PROCEDURE — 99214 OFFICE O/P EST MOD 30 MIN: CPT | Performed by: NURSE PRACTITIONER

## 2023-03-06 PROCEDURE — G8427 DOCREV CUR MEDS BY ELIG CLIN: HCPCS | Performed by: NURSE PRACTITIONER

## 2023-03-06 PROCEDURE — 1090F PRES/ABSN URINE INCON ASSESS: CPT | Performed by: NURSE PRACTITIONER

## 2023-03-06 PROCEDURE — 1036F TOBACCO NON-USER: CPT | Performed by: NURSE PRACTITIONER

## 2023-03-06 RX ORDER — DIAZEPAM 2 MG/1
2 TABLET ORAL DAILY
Qty: 90 TABLET | Refills: 3 | Status: SHIPPED | OUTPATIENT
Start: 2023-03-06 | End: 2023-06-04

## 2023-03-06 NOTE — PROGRESS NOTES
[address] [facility]. Og Pina M.D., F.A.C.P. Flavia Zapien, DNP, APRN, ACNS-BC  [place]. Elizabeth Catalan, MSN, APRN-FNP-C  Grady Moreno, MSN, APRN-FNP-C  Aurora Shone, MSPAS, PAHazelC  Kathleen Baca, MSN, APRN-FNP-C  [address] [address]  [place], [address] [address]  Phone: [phone]  Fax: [phone]       Angeli Baker is a 75 y.o. right handed female     Patient referred for further evaluation of vertigo    She is accompanied by her relative who is also serving as her     Patient referred for further evaluation of vertigo. Onset of dizziness began about 2 years ago. Niece notes that patient had eaten some seafood earlier for dinner and around midnight she got up and started experiencing some nausea but did not have any vomiting. Patient was complaining of the room spinning. Dizziness was positional but especially worse while lying down. Patient was complaining of a headache at that time. Patient now notes that she feels somewhat imbalanced when walking and still has a spinning sensation. She has no double vision or changes in her vision during these episodes. She does note some diaphoresis during the episode but no other symptoms. Patient does not have a history of headaches or family history of headaches. She has tried meclizine 12.5 mg which did work for her. She was not started on any new medications at that time. No hits to the head. Patient did have MRI of her brain that was ordered by ENT. MRI brain was negative for acute findings. Roseann-Hallpike was performed by ENT as well which was questionable although with positional testing patient did have significant nystagmus left with vision. VNG testing results indicated a right-sided vestibular weakness. She did have vestibular therapy as well. We tried her on nortriptyline nightly which helped her headaches but continues to have dizziness. We tried her on Valium 2 mg and she noted relief. She continues on the Valium and notes intermittent dizziness but notes it does work for her. She is leaving for [place] in the summer. Her  recently passed.       Sleep: does not sleep well gets around 5 hours a night goes to bed around 8 pm     No issues with chewing or swallowing  No chest pain or palpitations  No SOB  No vertigo, lightheadedness or loss of consciousness  No falls, tripping or stumbling  No incontinence of bowels or bladder  No itching or bruising appreciated  No numbness, tingling or focal arm/leg weakness    ROS is otherwise negative    Objective:     Vitals:    03/06/23 1111   BP: (!) 149/80   Site: Right Upper Arm   Pulse: 72   Temp: 98.2 °F (36.8 °C)   SpO2: 94%   Weight: 135 lb (61.2 kg)   Height: 5' 4\" (1.626 m)      General appearance: alert, appears stated age, cooperative and in no distress  Head: normocephalic, without obvious abnormality, atraumatic  Eyes: conjunctivae/corneas clear; no drainage  Neck: supple, symmetrical, trachea midline   Lungs: clear to auscultation bilaterally  Heart: regular rate and rhythm, S1, S2 normal  Abdomen: soft, non-tender; bowel sounds normal  Extremities: normal, atraumatic, no cyanosis or edema  Skin:  color, texture, turgor normal--no rashes or lesions      Mental Status: alert and oriented x 4    Appropriate attention/concentration  Intact fundus of knowledge      Speech: no dysarthria  Language: no aphasias, speaks [place]    Cranial Nerves:  I: smell    II: visual acuity     II: visual fields Full    II: pupils TODD   III,VII: ptosis None   III,IV,VI: extraocular muscles  EOMI without nystagmus   V: mastication Normal   V: facial light touch sensation  Normal   V,VII: corneal reflex     VII: facial muscle function - upper  Normal   VII: facial muscle function - lower Normal   VIII: hearing Left decreased   IX: soft palate elevation  Normal   IX,X: gag reflex    XI: trapezius strength  5/5   XI: sternocleidomastoid strength 5/5   XI: neck extension strength  5/5   XII: tongue strength  Normal     Motor:  5/5 throughout  Normal bulk and tone  No drift   No abnormal movements    Sensory:  LT normal    Coordination:   FN, FFM and TOVA normal    Gait:  Normal    DTR:   2+ throughout    Laboratory/Radiology:  ry/Radiology:     No recent labs or imaging studies reviewed independently today    Assessment:     Vertigo possibly caused by ? vestibular migraine  --- ENT work-up did show right-sided vestibular weakness  --- MRI brain was negative for stroke especially cerebellar stroke  --- tried nortriptyline helped with headaches but dizziness pursues   --- on Valium with some improvement     Plan:     Continue Valium 2 mg daily     Follow-up in 6 months     Call with any questions or concerns      SUSAN Faustin - CNP  11:11 AM  3/6/2023

## 2023-03-07 ENCOUNTER — TELEPHONE (OUTPATIENT)
Dept: INTERNAL MEDICINE | Age: 68
End: 2023-03-07

## 2023-04-26 ENCOUNTER — TELEPHONE (OUTPATIENT)
Dept: INTERNAL MEDICINE | Age: 68
End: 2023-04-26

## 2023-04-26 NOTE — TELEPHONE ENCOUNTER
PT LAST SEEN IN FEB NEEDS ALL REFILLS PT IS GOING [place] THE [place] PT IS REQUESTING 3 MONTH WORTH OF REFILLS

## 2023-04-26 NOTE — TELEPHONE ENCOUNTER
Called and spoke with son. Patient has follow up on 5/11 and will be able to make. Additional refills will be placed then. Patient thanked for the call back.

## 2023-05-11 ENCOUNTER — OFFICE VISIT (OUTPATIENT)
Dept: INTERNAL MEDICINE | Age: 68
End: 2023-05-11
Payer: COMMERCIAL

## 2023-05-11 VITALS
OXYGEN SATURATION: 99 % | DIASTOLIC BLOOD PRESSURE: 72 MMHG | TEMPERATURE: 97.6 F | SYSTOLIC BLOOD PRESSURE: 119 MMHG | BODY MASS INDEX: 21.51 KG/M2 | WEIGHT: 126 LBS | HEART RATE: 75 BPM | RESPIRATION RATE: 20 BRPM | HEIGHT: 64 IN

## 2023-05-11 DIAGNOSIS — Z63.4 BEREAVEMENT: ICD-10-CM

## 2023-05-11 DIAGNOSIS — H00.14 CHALAZION OF LEFT UPPER EYELID: ICD-10-CM

## 2023-05-11 DIAGNOSIS — F32.9 REACTIVE DEPRESSION: ICD-10-CM

## 2023-05-11 DIAGNOSIS — I10 PRIMARY HYPERTENSION: ICD-10-CM

## 2023-05-11 DIAGNOSIS — E78.5 HYPERLIPIDEMIA, UNSPECIFIED HYPERLIPIDEMIA TYPE: ICD-10-CM

## 2023-05-11 DIAGNOSIS — J41.0 SIMPLE CHRONIC BRONCHITIS (HCC): Primary | ICD-10-CM

## 2023-05-11 DIAGNOSIS — I10 ESSENTIAL HYPERTENSION: ICD-10-CM

## 2023-05-11 PROCEDURE — 3017F COLORECTAL CA SCREEN DOC REV: CPT

## 2023-05-11 PROCEDURE — 3023F SPIROM DOC REV: CPT

## 2023-05-11 PROCEDURE — 99212 OFFICE O/P EST SF 10 MIN: CPT

## 2023-05-11 PROCEDURE — 99214 OFFICE O/P EST MOD 30 MIN: CPT

## 2023-05-11 PROCEDURE — G8400 PT W/DXA NO RESULTS DOC: HCPCS

## 2023-05-11 PROCEDURE — 3078F DIAST BP <80 MM HG: CPT

## 2023-05-11 PROCEDURE — 1090F PRES/ABSN URINE INCON ASSESS: CPT

## 2023-05-11 PROCEDURE — 3074F SYST BP LT 130 MM HG: CPT

## 2023-05-11 PROCEDURE — G8420 CALC BMI NORM PARAMETERS: HCPCS

## 2023-05-11 PROCEDURE — 1123F ACP DISCUSS/DSCN MKR DOCD: CPT

## 2023-05-11 PROCEDURE — G8427 DOCREV CUR MEDS BY ELIG CLIN: HCPCS

## 2023-05-11 PROCEDURE — 1036F TOBACCO NON-USER: CPT

## 2023-05-11 RX ORDER — SERTRALINE HYDROCHLORIDE 25 MG/1
25 TABLET, FILM COATED ORAL DAILY
Qty: 90 TABLET | Refills: 0 | Status: CANCELLED | OUTPATIENT
Start: 2023-05-11

## 2023-05-11 RX ORDER — AMLODIPINE BESYLATE 5 MG/1
TABLET ORAL
Qty: 90 TABLET | Refills: 1 | Status: SHIPPED | OUTPATIENT
Start: 2023-05-11

## 2023-05-11 RX ORDER — UMECLIDINIUM BROMIDE AND VILANTEROL TRIFENATATE 62.5; 25 UG/1; UG/1
1 POWDER RESPIRATORY (INHALATION) DAILY
Qty: 1 EACH | Refills: 2 | Status: SHIPPED | OUTPATIENT
Start: 2023-05-11

## 2023-05-11 RX ORDER — ATORVASTATIN CALCIUM 20 MG/1
20 TABLET, FILM COATED ORAL DAILY
Qty: 90 TABLET | Refills: 1 | Status: SHIPPED | OUTPATIENT
Start: 2023-05-11

## 2023-05-11 RX ORDER — SERTRALINE HYDROCHLORIDE 25 MG/1
25 TABLET, FILM COATED ORAL DAILY
Qty: 90 TABLET | Refills: 0 | Status: SHIPPED | OUTPATIENT
Start: 2023-05-11

## 2023-05-11 RX ORDER — HYDROCHLOROTHIAZIDE 12.5 MG/1
12.5 CAPSULE, GELATIN COATED ORAL EVERY MORNING
Qty: 90 CAPSULE | Refills: 1 | Status: SHIPPED | OUTPATIENT
Start: 2023-05-11

## 2023-05-11 RX ORDER — CLONIDINE HYDROCHLORIDE 0.1 MG/1
0.1 TABLET ORAL 2 TIMES DAILY PRN
Qty: 90 TABLET | Refills: 1 | Status: SHIPPED | OUTPATIENT
Start: 2023-05-11

## 2023-05-11 SDOH — SOCIAL STABILITY - SOCIAL INSECURITY: DISSAPEARANCE AND DEATH OF FAMILY MEMBER: Z63.4

## 2023-05-11 ASSESSMENT — ENCOUNTER SYMPTOMS
ABDOMINAL PAIN: 0
APNEA: 0
ABDOMINAL DISTENTION: 0
EYE DISCHARGE: 0
CHEST TIGHTNESS: 0
EYE ITCHING: 1
SHORTNESS OF BREATH: 0

## 2023-06-19 RX ORDER — CLONIDINE HYDROCHLORIDE 0.1 MG/1
TABLET ORAL
Qty: 60 TABLET | Refills: 3 | OUTPATIENT
Start: 2023-06-19

## 2023-06-23 ENCOUNTER — TELEPHONE (OUTPATIENT)
Dept: PULMONOLOGY | Age: 68
End: 2023-06-23

## 2023-06-23 NOTE — TELEPHONE ENCOUNTER
Mailed a letter to patient informing her that her CT Chest is scheduled for 8-7-23 at 1:30 pm at [facility]. She must arrive by 1:00 pm. There is no prep for this test

## 2023-08-25 ENCOUNTER — HOSPITAL ENCOUNTER (OUTPATIENT)
Dept: CT IMAGING | Age: 68
Discharge: HOME | End: 2023-08-25
Attending: INTERNAL MEDICINE
Payer: COMMERCIAL

## 2023-08-25 DIAGNOSIS — J98.11 ROUND ATELECTASIS: ICD-10-CM

## 2023-08-25 DIAGNOSIS — J92.0 PLEURAL PLAQUE DUE TO ASBESTOS EXPOSURE: ICD-10-CM

## 2023-08-25 DIAGNOSIS — R91.8 GROUND GLASS OPACITY PRESENT ON IMAGING OF LUNG: ICD-10-CM

## 2023-08-25 PROCEDURE — 71250 CT THORAX DX C-: CPT

## 2023-11-27 ENCOUNTER — OFFICE VISIT (OUTPATIENT)
Dept: INTERNAL MEDICINE | Age: 68
End: 2023-11-27
Payer: COMMERCIAL

## 2023-11-27 VITALS
HEIGHT: 64 IN | OXYGEN SATURATION: 98 % | TEMPERATURE: 97.4 F | HEART RATE: 89 BPM | SYSTOLIC BLOOD PRESSURE: 139 MMHG | RESPIRATION RATE: 18 BRPM | DIASTOLIC BLOOD PRESSURE: 70 MMHG | WEIGHT: 127.6 LBS | BODY MASS INDEX: 21.78 KG/M2

## 2023-11-27 DIAGNOSIS — I10 ESSENTIAL HYPERTENSION: ICD-10-CM

## 2023-11-27 DIAGNOSIS — E78.5 HYPERLIPIDEMIA, UNSPECIFIED HYPERLIPIDEMIA TYPE: ICD-10-CM

## 2023-11-27 DIAGNOSIS — H04.129 DRY EYES DUE TO DECREASED TEAR PRODUCTION: Primary | ICD-10-CM

## 2023-11-27 DIAGNOSIS — K21.9 GASTROESOPHAGEAL REFLUX DISEASE WITHOUT ESOPHAGITIS: ICD-10-CM

## 2023-11-27 PROCEDURE — G8420 CALC BMI NORM PARAMETERS: HCPCS

## 2023-11-27 PROCEDURE — G8427 DOCREV CUR MEDS BY ELIG CLIN: HCPCS

## 2023-11-27 PROCEDURE — 1123F ACP DISCUSS/DSCN MKR DOCD: CPT

## 2023-11-27 PROCEDURE — 99212 OFFICE O/P EST SF 10 MIN: CPT

## 2023-11-27 PROCEDURE — 1090F PRES/ABSN URINE INCON ASSESS: CPT

## 2023-11-27 PROCEDURE — 3074F SYST BP LT 130 MM HG: CPT

## 2023-11-27 PROCEDURE — 1036F TOBACCO NON-USER: CPT

## 2023-11-27 PROCEDURE — 93784 AMBL BP MNTR W/SOFTWARE: CPT

## 2023-11-27 PROCEDURE — G8400 PT W/DXA NO RESULTS DOC: HCPCS

## 2023-11-27 PROCEDURE — 3017F COLORECTAL CA SCREEN DOC REV: CPT

## 2023-11-27 PROCEDURE — G8484 FLU IMMUNIZE NO ADMIN: HCPCS

## 2023-11-27 PROCEDURE — 99214 OFFICE O/P EST MOD 30 MIN: CPT

## 2023-11-27 PROCEDURE — 3078F DIAST BP <80 MM HG: CPT

## 2023-11-27 RX ORDER — AMLODIPINE BESYLATE 5 MG/1
TABLET ORAL
Qty: 90 TABLET | Refills: 1 | Status: SHIPPED | OUTPATIENT
Start: 2023-11-27

## 2023-11-27 RX ORDER — HYDROCHLOROTHIAZIDE 12.5 MG/1
12.5 CAPSULE, GELATIN COATED ORAL EVERY MORNING
Qty: 90 CAPSULE | Refills: 1 | Status: CANCELLED | OUTPATIENT
Start: 2023-11-27

## 2023-11-27 RX ORDER — ATORVASTATIN CALCIUM 20 MG/1
20 TABLET, FILM COATED ORAL DAILY
Qty: 90 TABLET | Refills: 1 | Status: SHIPPED | OUTPATIENT
Start: 2023-11-27

## 2023-11-27 RX ORDER — OMEPRAZOLE 20 MG/1
20 CAPSULE, DELAYED RELEASE ORAL
Qty: 20 CAPSULE | Refills: 1 | Status: SHIPPED | OUTPATIENT
Start: 2023-11-27 | End: 2023-12-17

## 2023-11-27 RX ORDER — FAMOTIDINE 20 MG/1
20 TABLET, FILM COATED ORAL
Qty: 20 TABLET | Refills: 1 | Status: SHIPPED | OUTPATIENT
Start: 2023-11-27

## 2023-11-27 SDOH — ECONOMIC STABILITY: FOOD INSECURITY: WITHIN THE PAST 12 MONTHS, YOU WORRIED THAT YOUR FOOD WOULD RUN OUT BEFORE YOU GOT MONEY TO BUY MORE.: SOMETIMES TRUE

## 2023-11-27 SDOH — ECONOMIC STABILITY: FOOD INSECURITY: WITHIN THE PAST 12 MONTHS, THE FOOD YOU BOUGHT JUST DIDN'T LAST AND YOU DIDN'T HAVE MONEY TO GET MORE.: SOMETIMES TRUE

## 2023-11-27 SDOH — ECONOMIC STABILITY: INCOME INSECURITY: HOW HARD IS IT FOR YOU TO PAY FOR THE VERY BASICS LIKE FOOD, HOUSING, MEDICAL CARE, AND HEATING?: SOMEWHAT HARD

## 2023-11-27 ASSESSMENT — ENCOUNTER SYMPTOMS
EYE ITCHING: 1
APNEA: 0
EYE DISCHARGE: 0
CHEST TIGHTNESS: 0

## 2023-11-27 NOTE — PATIENT INSTRUCTIONS
Dear Castro Jordan,    Thank you for coming to your appointment today. I hope we have addressed all of your needs. Please make sure to do the following:  - Continue your medications as listed. - Get labs drawn before our next follow up. - We will see each other again in 5 weeks and will do further investigations if the stomach pain and chest pain still continues. Call for a sooner appointment if you develop any new or worsening symptoms. Have a great day!     Sincerely,  Elena Arce MD  11/27/2023  9:59 AM

## 2024-01-03 ENCOUNTER — HOSPITAL ENCOUNTER (OUTPATIENT)
Age: 69
Discharge: HOME | End: 2024-01-03
Payer: COMMERCIAL

## 2024-01-03 ENCOUNTER — OFFICE VISIT (OUTPATIENT)
Dept: INTERNAL MEDICINE | Age: 69
End: 2024-01-03
Payer: COMMERCIAL

## 2024-01-03 VITALS
OXYGEN SATURATION: 95 % | SYSTOLIC BLOOD PRESSURE: 126 MMHG | HEIGHT: 64 IN | WEIGHT: 131 LBS | BODY MASS INDEX: 22.36 KG/M2 | RESPIRATION RATE: 18 BRPM | DIASTOLIC BLOOD PRESSURE: 77 MMHG | HEART RATE: 82 BPM | TEMPERATURE: 97.2 F

## 2024-01-03 DIAGNOSIS — R73.03 PRE-DIABETES: ICD-10-CM

## 2024-01-03 DIAGNOSIS — K21.9 GASTROESOPHAGEAL REFLUX DISEASE WITHOUT ESOPHAGITIS: ICD-10-CM

## 2024-01-03 DIAGNOSIS — I20.9 ANGINAL PAIN (HCC): ICD-10-CM

## 2024-01-03 DIAGNOSIS — E78.1 HYPERTRIGLYCERIDEMIA: ICD-10-CM

## 2024-01-03 DIAGNOSIS — E78.1 HYPERTRIGLYCERIDEMIA: Primary | ICD-10-CM

## 2024-01-03 LAB
CHOLEST SERPL-MCNC: 187 MG/DL
HBA1C MFR BLD: 6.4 %
HDLC SERPL-MCNC: 49 MG/DL
LDLC SERPL CALC-MCNC: 92 MG/DL
LIPASE SERPL-CCNC: 25 U/L (ref 13–60)
TRIGL SERPL-MCNC: 231 MG/DL
TSH SERPL DL<=0.05 MIU/L-ACNC: 2.05 UIU/ML (ref 0.27–4.2)
VLDLC SERPL CALC-MCNC: 46 MG/DL

## 2024-01-03 PROCEDURE — 1036F TOBACCO NON-USER: CPT

## 2024-01-03 PROCEDURE — 83036 HEMOGLOBIN GLYCOSYLATED A1C: CPT

## 2024-01-03 PROCEDURE — G8427 DOCREV CUR MEDS BY ELIG CLIN: HCPCS

## 2024-01-03 PROCEDURE — 1090F PRES/ABSN URINE INCON ASSESS: CPT

## 2024-01-03 PROCEDURE — G8420 CALC BMI NORM PARAMETERS: HCPCS

## 2024-01-03 PROCEDURE — G8484 FLU IMMUNIZE NO ADMIN: HCPCS

## 2024-01-03 PROCEDURE — 93010 ELECTROCARDIOGRAM REPORT: CPT

## 2024-01-03 PROCEDURE — 3074F SYST BP LT 130 MM HG: CPT

## 2024-01-03 PROCEDURE — 3078F DIAST BP <80 MM HG: CPT

## 2024-01-03 PROCEDURE — 84443 ASSAY THYROID STIM HORMONE: CPT

## 2024-01-03 PROCEDURE — 36415 COLL VENOUS BLD VENIPUNCTURE: CPT

## 2024-01-03 PROCEDURE — 80061 LIPID PANEL: CPT

## 2024-01-03 PROCEDURE — 83690 ASSAY OF LIPASE: CPT

## 2024-01-03 PROCEDURE — 3017F COLORECTAL CA SCREEN DOC REV: CPT

## 2024-01-03 PROCEDURE — 93005 ELECTROCARDIOGRAM TRACING: CPT

## 2024-01-03 PROCEDURE — 1123F ACP DISCUSS/DSCN MKR DOCD: CPT

## 2024-01-03 PROCEDURE — 99214 OFFICE O/P EST MOD 30 MIN: CPT

## 2024-01-03 PROCEDURE — G8400 PT W/DXA NO RESULTS DOC: HCPCS

## 2024-01-03 PROCEDURE — 99212 OFFICE O/P EST SF 10 MIN: CPT

## 2024-01-03 RX ORDER — BLOOD-GLUCOSE METER
1 KIT MISCELLANEOUS DAILY
Qty: 1 KIT | Refills: 3 | Status: SHIPPED | OUTPATIENT
Start: 2024-01-03 | End: 2024-02-02

## 2024-01-03 RX ORDER — LANCETS 30 GAUGE
1 EACH MISCELLANEOUS DAILY
Qty: 300 EACH | Refills: 3 | Status: SHIPPED | OUTPATIENT
Start: 2024-01-03 | End: 2024-02-02

## 2024-01-03 RX ORDER — BLOOD-GLUCOSE METER
1 KIT MISCELLANEOUS DAILY
Qty: 1 KIT | Refills: 2 | Status: SHIPPED | OUTPATIENT
Start: 2024-01-03

## 2024-01-03 RX ORDER — SUCRALFATE 1 G/1
1 TABLET ORAL 4 TIMES DAILY
Qty: 120 TABLET | Refills: 3 | Status: SHIPPED | OUTPATIENT
Start: 2024-01-03

## 2024-01-03 RX ORDER — GLUCOSAMINE HCL/CHONDROITIN SU 500-400 MG
CAPSULE ORAL
Qty: 30 STRIP | Refills: 2 | Status: SHIPPED | OUTPATIENT
Start: 2024-01-03

## 2024-01-03 RX ORDER — PANTOPRAZOLE SODIUM 40 MG/1
40 TABLET, DELAYED RELEASE ORAL
Qty: 30 TABLET | Refills: 0 | Status: SHIPPED | OUTPATIENT
Start: 2024-01-03

## 2024-01-03 ASSESSMENT — PATIENT HEALTH QUESTIONNAIRE - PHQ9
9. THOUGHTS THAT YOU WOULD BE BETTER OFF DEAD, OR OF HURTING YOURSELF: 0
7. TROUBLE CONCENTRATING ON THINGS, SUCH AS READING THE NEWSPAPER OR WATCHING TELEVISION: 0
SUM OF ALL RESPONSES TO PHQ QUESTIONS 1-9: 0
8. MOVING OR SPEAKING SO SLOWLY THAT OTHER PEOPLE COULD HAVE NOTICED. OR THE OPPOSITE, BEING SO FIGETY OR RESTLESS THAT YOU HAVE BEEN MOVING AROUND A LOT MORE THAN USUAL: 0
10. IF YOU CHECKED OFF ANY PROBLEMS, HOW DIFFICULT HAVE THESE PROBLEMS MADE IT FOR YOU TO DO YOUR WORK, TAKE CARE OF THINGS AT HOME, OR GET ALONG WITH OTHER PEOPLE: 0
6. FEELING BAD ABOUT YOURSELF - OR THAT YOU ARE A FAILURE OR HAVE LET YOURSELF OR YOUR FAMILY DOWN: 0
SUM OF ALL RESPONSES TO PHQ QUESTIONS 1-9: 0
5. POOR APPETITE OR OVEREATING: 0
4. FEELING TIRED OR HAVING LITTLE ENERGY: 0
3. TROUBLE FALLING OR STAYING ASLEEP: 0
SUM OF ALL RESPONSES TO PHQ QUESTIONS 1-9: 0
2. FEELING DOWN, DEPRESSED OR HOPELESS: 0
SUM OF ALL RESPONSES TO PHQ QUESTIONS 1-9: 0

## 2024-01-03 NOTE — PATIENT INSTRUCTIONS
Dear Nahum Jordan,        Thank you for coming to your appointment today. I hope we have addressed all of your needs.       Please make sure to do the following:  - Continue your medications as listed.  - Get labs drawn before our next follow up. We will call you with the results   - Referrals have been made to general surgery:  If you do not hear from the office in 1 week, please call the number listed.  - We will see each other again in 3 months    Call for a sooner appointment if you develop any issues, abdominal pain symptoms gets severe , Shortness of breath and chest pain    Have a great day!        Sincerely,  Daphne Limon MD  1/3/2024  10:52 AM

## 2024-01-03 NOTE — PROGRESS NOTES
[facility]  Internal Medicine Residency Clinic    Attending Physician Statement  I have discussed the case, including pertinent history and exam findings with the resident physician.I have seen and examined the patient and the key elements of the encounter have been performed by me. I agree with the assessment, plan and orders as documented by the resident. I have reviewed the relevant PMHx, PSHx, FamHx, SocialHx, medications, and allergies and updated history as appropriate.    Patient presents for routine follow up of medical problems. Interview conducted with some difficulty using ipad  and patients family -- often talking over etc.    Epigastric pain associated with food intake -- suspect PUD  Recent trial pepcid / low dose PPI  with no real benefit.Start Carafate and protonix 40 mg; referral to general surgery for consideration of EGD.    Possible cardiac chest pain  At times fatigued and reports left chest pressure occurs at night. This appears to be likely reflux related. EKG obtained in office NSR without evidence of current or prior ischemia. Ok for exercise stress test.     HTN  Controlled on amlodipine 5mg daily; not taking HCTZ recently so will discontinue     Hyper TGs  Continue statin therapy and repeat lipid profile, check TSH since last TGs > 800.     Pre-DM  A1c 6.4% today in office, counseled with daughter regarding monitor; recommend home glucose monitoring PRN as desired by family    Remainder of medical problems as per resident note.    Bunny Judd,   1/3/2024 10:07 AM    
and leg swelling.   Gastrointestinal:  Positive for abdominal pain. Negative for abdominal distention, blood in stool, constipation, diarrhea, nausea and vomiting.   Genitourinary:  Negative for difficulty urinating and flank pain.   Musculoskeletal:  Negative for arthralgias, back pain, gait problem and joint swelling.   Neurological:  Negative for dizziness, seizures, syncope, speech difficulty, light-headedness, numbness and headaches.   Psychiatric/Behavioral:  Negative for agitation, behavioral problems, confusion and decreased concentration.           Objective   Physical Exam  Constitutional:       General: She is not in acute distress.  HENT:      Head: Normocephalic and atraumatic.      Mouth/Throat:      Mouth: Mucous membranes are moist.      Pharynx: Oropharynx is clear. No oropharyngeal exudate.   Cardiovascular:      Rate and Rhythm: Normal rate and regular rhythm.      Pulses: Normal pulses.      Heart sounds: No murmur heard.  Pulmonary:      Effort: Pulmonary effort is normal. No respiratory distress.      Breath sounds: No stridor. No wheezing or rales.   Abdominal:      General: Abdomen is flat. There is no distension.      Palpations: There is no mass.      Tenderness: There is abdominal tenderness. There is no guarding or rebound.      Hernia: No hernia is present.   Musculoskeletal:         General: No swelling. Normal range of motion.      Cervical back: Normal range of motion. No rigidity.   Skin:     Capillary Refill: Capillary refill takes less than 2 seconds.   Neurological:      General: No focal deficit present.      Mental Status: She is alert.      Cranial Nerves: No cranial nerve deficit.      Motor: No weakness.   Psychiatric:         Mood and Affect: Mood normal.            On this date 1/3/2024 I have spent 30 minutes reviewing previous notes, test results and face to face with the patient discussing the diagnosis and importance of compliance with the treatment plan as well as

## 2024-01-04 ASSESSMENT — ENCOUNTER SYMPTOMS
COUGH: 0
SHORTNESS OF BREATH: 0
ABDOMINAL PAIN: 1
STRIDOR: 0
BLOOD IN STOOL: 0
CONSTIPATION: 0
BACK PAIN: 0
NAUSEA: 0
ABDOMINAL DISTENTION: 0
VOMITING: 0
DIARRHEA: 0
CHEST TIGHTNESS: 1
WHEEZING: 0

## 2024-01-11 DIAGNOSIS — K21.9 GASTROESOPHAGEAL REFLUX DISEASE WITHOUT ESOPHAGITIS: ICD-10-CM

## 2024-01-11 RX ORDER — PANTOPRAZOLE SODIUM 40 MG/1
40 TABLET, DELAYED RELEASE ORAL
Qty: 30 TABLET | Refills: 0 | OUTPATIENT
Start: 2024-01-11

## 2024-01-16 DIAGNOSIS — K21.9 GASTROESOPHAGEAL REFLUX DISEASE WITHOUT ESOPHAGITIS: ICD-10-CM

## 2024-01-16 RX ORDER — FAMOTIDINE 20 MG/1
20 TABLET, FILM COATED ORAL NIGHTLY
Qty: 20 TABLET | Refills: 1 | Status: SHIPPED | OUTPATIENT
Start: 2024-01-16

## 2024-01-16 NOTE — TELEPHONE ENCOUNTER
Last Appointment:  11/27/2023  Future Appointments   Date Time Provider Department Center   2/15/2024  9:00 AM Leo Padilla MD [facility]   4/17/2024  8:30 AM [facility]

## 2024-02-13 ENCOUNTER — TELEPHONE (OUTPATIENT)
Age: 69
End: 2024-02-13

## 2024-02-14 ENCOUNTER — HOSPITAL ENCOUNTER (OUTPATIENT)
Age: 69
Discharge: HOME | End: 2024-02-16
Payer: COMMERCIAL

## 2024-02-14 ENCOUNTER — HOSPITAL ENCOUNTER (OUTPATIENT)
Dept: NUCLEAR MEDICINE | Age: 69
Discharge: HOME | End: 2024-02-16
Payer: COMMERCIAL

## 2024-02-14 VITALS — HEIGHT: 64 IN | WEIGHT: 126 LBS | BODY MASS INDEX: 21.51 KG/M2

## 2024-02-14 DIAGNOSIS — I20.9 ANGINAL PAIN (HCC): ICD-10-CM

## 2024-02-14 LAB
ECHO BSA: 1.61 M2
NUC STRESS EJECTION FRACTION: 85 %
STRESS ANGINA INDEX: 0
STRESS BASELINE DIAS BP: 78 MMHG
STRESS BASELINE HR: 73 BPM
STRESS BASELINE SYS BP: 118 MMHG
STRESS ESTIMATED WORKLOAD: 10 METS
STRESS EXERCISE DUR MIN: 7 MIN
STRESS EXERCISE DUR SEC: 31 SEC
STRESS PEAK DIAS BP: 82 MMHG
STRESS PERCENT HR ACHIEVED: 91 %
STRESS POST PEAK HR: 137 BPM
STRESS RATE PRESSURE PRODUCT: NORMAL BPM*MMHG
STRESS SR DUKE TREADMILL SCORE: 8
STRESS ST DEPRESSION: 0 MM
STRESS TARGET HR: 151 BPM
TID: 0.82

## 2024-02-14 PROCEDURE — 3430000000 HC RX DIAGNOSTIC RADIOPHARMACEUTICAL: Performed by: RADIOLOGY

## 2024-02-14 PROCEDURE — A9500 TC99M SESTAMIBI: HCPCS | Performed by: RADIOLOGY

## 2024-02-14 PROCEDURE — 78452 HT MUSCLE IMAGE SPECT MULT: CPT

## 2024-02-14 PROCEDURE — 93017 CV STRESS TEST TRACING ONLY: CPT

## 2024-02-14 RX ORDER — TETRAKIS(2-METHOXYISOBUTYLISOCYANIDE)COPPER(I) TETRAFLUOROBORATE 1 MG/ML
11 INJECTION, POWDER, LYOPHILIZED, FOR SOLUTION INTRAVENOUS
Status: COMPLETED | OUTPATIENT
Start: 2024-02-14 | End: 2024-02-14

## 2024-02-14 RX ORDER — TETRAKIS(2-METHOXYISOBUTYLISOCYANIDE)COPPER(I) TETRAFLUOROBORATE 1 MG/ML
35 INJECTION, POWDER, LYOPHILIZED, FOR SOLUTION INTRAVENOUS
Status: COMPLETED | OUTPATIENT
Start: 2024-02-14 | End: 2024-02-14

## 2024-02-14 RX ADMIN — Medication 35 MILLICURIE: at 10:09

## 2024-02-14 RX ADMIN — Medication 11 MILLICURIE: at 07:34

## 2024-02-14 NOTE — PROCEDURES
Exercise Nuclear Stress Test:    Cardiologist: Dr. Aj Gresham    Baseline EKG: Normal sinus rhythm, normal EKG    Indications for study: Chest pain    Exercise stress test was performed using the Carlo Protocol  Non anginal chest pain  Exercise time: 7:30 min, METs: 10, MPHR: 90%, Duke treadmill score: 8  No new arrhythmias  No EKG changes suggestive of stress induced ischemia  Above average functional capacity  There was an appropriate BP and heart response to exercise and recovery  Nuclear images pending    Aj Gresham MD., FACC.   [facility]

## 2024-02-14 NOTE — CONSULTS
MORGAN (10/2022) 12 > (1/11/23) 13 > (2/7/23) 1> 3 (4/13/23)>2 (7/18/23)  PHQ (10/22) 18 > (1/11/23) 14 > (2/7/23) 3 > 7 (4/13/2023)>4 (7/18/23)   fatigue and nightmares - resolved   Started fluoxetine 10 mg (1/11/23) > 20mg (2/7/23) >40mg qd (4/2023)   Follow up 3 months  Discussed side effects and if side effects are worsening patient to inform clinic and follow-up sooner for appointment   Session ID: [id number]  Language: Mandarin   ID: #[id number]   Name: Kim

## 2024-02-15 ENCOUNTER — PREP FOR PROCEDURE (OUTPATIENT)
Dept: SURGERY | Age: 69
End: 2024-02-15

## 2024-02-15 ENCOUNTER — OFFICE VISIT (OUTPATIENT)
Dept: SURGERY | Age: 69
End: 2024-02-15
Payer: COMMERCIAL

## 2024-02-15 ENCOUNTER — TELEPHONE (OUTPATIENT)
Dept: SURGERY | Age: 69
End: 2024-02-15

## 2024-02-15 VITALS
BODY MASS INDEX: 21.63 KG/M2 | DIASTOLIC BLOOD PRESSURE: 70 MMHG | TEMPERATURE: 97.3 F | OXYGEN SATURATION: 95 % | WEIGHT: 126 LBS | HEART RATE: 82 BPM | SYSTOLIC BLOOD PRESSURE: 142 MMHG | RESPIRATION RATE: 18 BRPM

## 2024-02-15 DIAGNOSIS — R10.13 EPIGASTRIC PAIN: Primary | ICD-10-CM

## 2024-02-15 PROBLEM — K21.9 GERD (GASTROESOPHAGEAL REFLUX DISEASE): Status: ACTIVE | Noted: 2024-02-15

## 2024-02-15 PROCEDURE — 1036F TOBACCO NON-USER: CPT | Performed by: SURGERY

## 2024-02-15 PROCEDURE — 3078F DIAST BP <80 MM HG: CPT | Performed by: SURGERY

## 2024-02-15 PROCEDURE — 99214 OFFICE O/P EST MOD 30 MIN: CPT | Performed by: SURGERY

## 2024-02-15 PROCEDURE — 1123F ACP DISCUSS/DSCN MKR DOCD: CPT | Performed by: SURGERY

## 2024-02-15 PROCEDURE — 3077F SYST BP >= 140 MM HG: CPT | Performed by: SURGERY

## 2024-02-15 PROCEDURE — G8420 CALC BMI NORM PARAMETERS: HCPCS | Performed by: SURGERY

## 2024-02-15 PROCEDURE — 1090F PRES/ABSN URINE INCON ASSESS: CPT | Performed by: SURGERY

## 2024-02-15 PROCEDURE — 99212 OFFICE O/P EST SF 10 MIN: CPT | Performed by: SURGERY

## 2024-02-15 PROCEDURE — G8484 FLU IMMUNIZE NO ADMIN: HCPCS | Performed by: SURGERY

## 2024-02-15 PROCEDURE — G8427 DOCREV CUR MEDS BY ELIG CLIN: HCPCS | Performed by: SURGERY

## 2024-02-15 PROCEDURE — 3017F COLORECTAL CA SCREEN DOC REV: CPT | Performed by: SURGERY

## 2024-02-15 PROCEDURE — G8400 PT W/DXA NO RESULTS DOC: HCPCS | Performed by: SURGERY

## 2024-02-15 NOTE — PROGRESS NOTES
[facility]  General Surgery Attending Office Progress Note    Chief Complaint:  epigastric pain       Subjective:   Nahum Jordan complains of epigastric pain x 2-3 months. She notes a dull ache.   Denies burning sensation. Notes belching.   No fevers. No chills. No nausea/ vomiting  Food does not make it worse in gnernal.  This am, she ate sticky rice and she felt fullness    She was started on pantoprazole recently and she notes relief of the odors in the mouth.     --I have reviewed and confirmed the past medical history, surgical history, social history, allergies in the chart.  --Medications: I have reviewed the medication list in the chart.    --Review of systems-pertinent positives noted in HPI, otherwise negative      Objective:     Vitals:    02/15/24 0849   BP: (!) 142/70   Pulse: 82   Resp: 18   Temp: 97.3 °F (36.3 °C)   SpO2: 95%     Physical Exam  Awake alert x3, GCS 15  No apparent distress  Heart regular rate rhythm  Lungs are clear bilaterally  Abdomen soft nontender nondistended       Assessment:      Diagnosis Orders   1. Epigastric pain            Plan:     - I have reviewed the prior progress note from the patient's primary care provider visit on 1/3/24  .  -I have reviewed the progress note from the NM stress test on 2/14/24    -I have reviewed the following  labs:  HgBA1c:    Lab Results   Component Value Date/Time    LABA1C 6.4 01/03/2024 09:13 AM          -Epigastric pain/ no prior EGD  Plan on EGD  I have discussed the risks, benefits, and alternatives to esophagogastroduodenoscopy with possible biopsy with deep sedation with the patient. I have detailed the risks of deep sedation (hypotension, hypoxia) as well as complications of bleeding and perforation.  The patient understands the above and agrees to proceed.  Continue protonix 40 mg daily and carafate     Pt prefers AM    Used Ipad  [place] [id number]       Leo Padilla MD, FACS  2/15/2024  9:13 AM      NOTE:

## 2024-02-15 NOTE — TELEPHONE ENCOUNTER
Prior Authorization Form:      DEMOGRAPHICS:                     Patient Name:  Nahum Jordan  Patient :  1955            Insurance:  Payor: [facility] / Plan: [facility] MEDICAID / Product Type: *No Product type* /   Insurance ID Number:    Payer/Plan Subscr  Sex Relation Sub. Ins. ID Effective Group Num   [facility] NAHUM JORDAN 1955 Female Self [id number] [address]         DIAGNOSIS & PROCEDURE:                       Procedure/Operation: EGD           CPT Code: 20598    Diagnosis:  GERD    ICD10 Code: K21.9    Location:  [facility]    Surgeon:  Leo Padilla MD      SCHEDULING INFORMATION:                          Date: 2024    Time: 0730              Anesthesia:  MAC/TIVA                                                       Status:  Outpatient        Electronically signed by Nikki Sewell MA on 2/15/2024 at 9:38 AM

## 2024-02-19 DIAGNOSIS — K21.9 GASTROESOPHAGEAL REFLUX DISEASE WITHOUT ESOPHAGITIS: ICD-10-CM

## 2024-02-19 RX ORDER — FAMOTIDINE 20 MG/1
20 TABLET, FILM COATED ORAL NIGHTLY
Qty: 58 TABLET | Refills: 0 | Status: SHIPPED | OUTPATIENT
Start: 2024-02-19

## 2024-02-19 NOTE — TELEPHONE ENCOUNTER
Last Appointment:  1/3/24  Future Appointments   Date Time Provider Department Center   4/17/2024  8:30 AM Babar Holt MD [facility]      Famotidine filled 1/16/24 #20 one refill (40 days = 2/25/24)  Next appt 4/17/24 (58 days)

## 2024-03-13 NOTE — PROGRESS NOTES
[facility] PRE-ADMISSION TESTING   ENDOSCOPY INSTRUCTIONS  PAT- Phone Number: [phone]    ENDOSCOPY INSTRUCTIONS:     [x] Antibacterial Soap Shower Night before or morning of procedure.  [x] Do not wear makeup, lotions, powders, deodorant.   [x] Nothing to eat or drink after midnight. This includes no gum, candy, mints or water.  [x] You may brush your teeth, gargle, but do NOT swallow water.   [x] No tobacco products, illegal drugs, or alcohol within 24 hours of your surgery.  [x] Jewelry or valuables should not be brought to the hospital. All body and/or tongue piercing's must be removed prior to arriving to hospital. No contact lens or hair pins.   [x] Arrange transportation with a responsible adult  to and from the hospital. If you do not have a responsible adult  to transport you, you will need to make arrangements with a medical transportation company. Arrange for someone to be with you for the remainder of the day and for 24 hours after your procedure due to having had anesthesia.    -Who will be your  for transportation? daughter  -Who will be staying with you for 24 hrs after your procedure? daughter  [x] Bring insurance card and photo ID.  [] Bring copy of living will or healthcare power of  papers to be placed in your electronic record.    PARKING INSTRUCTIONS:     [x] ARRIVAL DATE & TIME:   3/18  @  0615   [x] Times are subject to change. We will contact you the business day before surgery if that were to occur.  [x] Enter into the [facility] Entrance. Two people may accompany you. Masks are not required.  [x] Parking Lot \"I\" is where you will park. It is located on the corner of [address] and [address]. The entrance is on [address].   Only one vehicle - per patient, is permitted in parking lot.   Upon entering the parking lot, a voucher ticket will print.    MEDICATION INSTRUCTIONS:    [x] Bring a complete list of your

## 2024-03-17 ENCOUNTER — ANESTHESIA EVENT (OUTPATIENT)
Dept: ENDOSCOPY | Age: 69
End: 2024-03-17
Payer: COMMERCIAL

## 2024-03-18 ENCOUNTER — HOSPITAL ENCOUNTER (OUTPATIENT)
Age: 69
Setting detail: OUTPATIENT SURGERY
Discharge: HOME | End: 2024-03-18
Attending: SURGERY | Admitting: SURGERY
Payer: COMMERCIAL

## 2024-03-18 ENCOUNTER — ANESTHESIA (OUTPATIENT)
Dept: ENDOSCOPY | Age: 69
End: 2024-03-18
Payer: COMMERCIAL

## 2024-03-18 VITALS
HEIGHT: 64 IN | HEART RATE: 70 BPM | BODY MASS INDEX: 21.34 KG/M2 | SYSTOLIC BLOOD PRESSURE: 142 MMHG | TEMPERATURE: 97.9 F | RESPIRATION RATE: 21 BRPM | DIASTOLIC BLOOD PRESSURE: 83 MMHG | OXYGEN SATURATION: 94 % | WEIGHT: 125 LBS

## 2024-03-18 DIAGNOSIS — K21.9 GERD (GASTROESOPHAGEAL REFLUX DISEASE): ICD-10-CM

## 2024-03-18 DIAGNOSIS — K21.9 GASTROESOPHAGEAL REFLUX DISEASE WITHOUT ESOPHAGITIS: ICD-10-CM

## 2024-03-18 PROCEDURE — 2580000003 HC RX 258: Performed by: NURSE ANESTHETIST, CERTIFIED REGISTERED

## 2024-03-18 PROCEDURE — 2709999900 HC NON-CHARGEABLE SUPPLY: Performed by: SURGERY

## 2024-03-18 PROCEDURE — 7100000010 HC PHASE II RECOVERY - FIRST 15 MIN: Performed by: SURGERY

## 2024-03-18 PROCEDURE — 88305 TISSUE EXAM BY PATHOLOGIST: CPT

## 2024-03-18 PROCEDURE — 6360000002 HC RX W HCPCS: Performed by: NURSE ANESTHETIST, CERTIFIED REGISTERED

## 2024-03-18 PROCEDURE — 88342 IMHCHEM/IMCYTCHM 1ST ANTB: CPT

## 2024-03-18 PROCEDURE — 3700000001 HC ADD 15 MINUTES (ANESTHESIA): Performed by: SURGERY

## 2024-03-18 PROCEDURE — 3609012400 HC EGD TRANSORAL BIOPSY SINGLE/MULTIPLE: Performed by: SURGERY

## 2024-03-18 PROCEDURE — 3700000000 HC ANESTHESIA ATTENDED CARE: Performed by: SURGERY

## 2024-03-18 PROCEDURE — 7100000011 HC PHASE II RECOVERY - ADDTL 15 MIN: Performed by: SURGERY

## 2024-03-18 RX ORDER — SODIUM CHLORIDE 9 MG/ML
INJECTION, SOLUTION INTRAVENOUS CONTINUOUS PRN
Status: DISCONTINUED | OUTPATIENT
Start: 2024-03-18 | End: 2024-03-18 | Stop reason: SDUPTHER

## 2024-03-18 RX ORDER — SODIUM CHLORIDE 9 MG/ML
INJECTION, SOLUTION INTRAVENOUS PRN
Status: DISCONTINUED | OUTPATIENT
Start: 2024-03-18 | End: 2024-03-18 | Stop reason: HOSPADM

## 2024-03-18 RX ORDER — PANTOPRAZOLE SODIUM 40 MG/1
40 TABLET, DELAYED RELEASE ORAL
Qty: 90 TABLET | Refills: 3 | Status: SHIPPED | OUTPATIENT
Start: 2024-03-18 | End: 2025-03-13

## 2024-03-18 RX ORDER — SODIUM CHLORIDE 9 MG/ML
INJECTION, SOLUTION INTRAVENOUS CONTINUOUS
Status: DISCONTINUED | OUTPATIENT
Start: 2024-03-18 | End: 2024-03-18 | Stop reason: HOSPADM

## 2024-03-18 RX ORDER — SODIUM CHLORIDE 0.9 % (FLUSH) 0.9 %
5-40 SYRINGE (ML) INJECTION PRN
Status: DISCONTINUED | OUTPATIENT
Start: 2024-03-18 | End: 2024-03-18 | Stop reason: HOSPADM

## 2024-03-18 RX ORDER — PROPOFOL 10 MG/ML
INJECTION, EMULSION INTRAVENOUS PRN
Status: DISCONTINUED | OUTPATIENT
Start: 2024-03-18 | End: 2024-03-18 | Stop reason: SDUPTHER

## 2024-03-18 RX ORDER — SODIUM CHLORIDE 0.9 % (FLUSH) 0.9 %
5-40 SYRINGE (ML) INJECTION EVERY 12 HOURS SCHEDULED
Status: DISCONTINUED | OUTPATIENT
Start: 2024-03-18 | End: 2024-03-18 | Stop reason: HOSPADM

## 2024-03-18 RX ADMIN — PROPOFOL 90 MG: 10 INJECTION, EMULSION INTRAVENOUS at 07:49

## 2024-03-18 RX ADMIN — SODIUM CHLORIDE: 9 INJECTION, SOLUTION INTRAVENOUS at 07:21

## 2024-03-18 ASSESSMENT — PAIN - FUNCTIONAL ASSESSMENT
PAIN_FUNCTIONAL_ASSESSMENT: NONE - DENIES PAIN
PAIN_FUNCTIONAL_ASSESSMENT: 0-10

## 2024-03-18 ASSESSMENT — LIFESTYLE VARIABLES: SMOKING_STATUS: 0

## 2024-03-18 ASSESSMENT — ENCOUNTER SYMPTOMS: SHORTNESS OF BREATH: 0

## 2024-03-18 NOTE — H&P
H&P    Subjective:   Nahum Jordan complains of epigastric pain x 2-3 months. She notes a dull ache.   Denies burning sensation. Notes belching.   No fevers. No chills. No nausea/ vomiting  Food does not make it worse in gnernal.  This am, she ate sticky rice and she felt fullness     She was started on pantoprazole recently and she notes relief of the odors in the mouth.       Past Medical History:   Diagnosis Date    Hyperlipidemia     Hypertension     TB (pulmonary tuberculosis)      Past Surgical History:   Procedure Laterality Date    BRONCHOSCOPY  12/14/2016    BRONCHOSCOPY N/A 9/15/2020    BRONCHOSCOPY W/EBUS FNA performed by Lupe Brennan MD at [facility] ENDOSCOPY    BRONCHOSCOPY  9/15/2020    BRONCHOSCOPY THERAPUTIC ASPIRATION INITIAL performed by Lupe Brennan MD at [facility] ENDOSCOPY    BRONCHOSCOPY  9/15/2020    BRONCHOSCOPY ALVEOLAR LAVAGE performed by Lupe Brennan MD at [facility] ENDOSCOPY    BRONCHOSCOPY  9/15/2020    BRONCHOSCOPY ADD ON COMPUTER ASSISTED performed by Lupe Brennan MD at [facility] ENDOSCOPY    BRONCHOSCOPY  9/15/2020    BRONCHOSCOPY/TRANSBRONCHIAL NEEDLE BIOPSY performed by Lupe Brennan MD at [facility] ENDOSCOPY    COLONOSCOPY N/A 1/26/2021    COLONOSCOPY POLYPECTOMY SNARE/COLD BIOPSY performed by Leo Padilla MD at [facility] ENDOSCOPY    HYSTERECTOMY (CERVIX STATUS UNKNOWN)      LEEP  7/23/2014    EXAM     Social History     Socioeconomic History    Marital status:      Spouse name: Not on file    Number of children: Not on file    Years of education: Not on file    Highest education level: Not on file   Occupational History    Not on file   Tobacco Use    Smoking status: Never    Smokeless tobacco: Never   Vaping Use    Vaping Use: Never used   Substance and Sexual Activity    Alcohol use: No    Drug use: No    Sexual activity: Not Currently   Other Topics Concern    Not on file   Social History Narrative    Not on file     Social Determinants of Health     Financial Resource Strain: Medium

## 2024-03-18 NOTE — PROGRESS NOTES
Dr. Padilla at bedside discussing results of scope and discharge instructions with patient and patient's daughter.

## 2024-03-18 NOTE — ANESTHESIA PRE PROCEDURE
Department of Anesthesiology  Preprocedure Note       Name:  Nahum Jordan   Age:  68 y.o.  :  1955                                          MRN:  [id number]         Date:  3/18/2024      Surgeon: Surgeon(s):  Leo Padilla MD    Procedure: Procedure(s):  ESOPHAGOGASTRODUODENOSCOPY, POSSIBLE BIOPSY    Medications prior to admission:   Prior to Admission medications    Medication Sig Start Date End Date Taking? Authorizing Provider   famotidine (PEPCID) 20 MG tablet Take 1 tablet by mouth nightly  Patient not taking: Reported on 3/18/2024 2/19/24   Leo Webb Jr.,    sucralfate (CARAFATE) 1 GM tablet Take 1 tablet by mouth 4 times daily 1/3/24   Daphne Limon MD   pantoprazole (PROTONIX) 40 MG tablet Take 1 tablet by mouth every morning (before breakfast)  Patient not taking: Reported on 3/18/2024 1/3/24   Daphne Limon MD   glucose monitoring kit 1 kit by Does not apply route daily 1/3/24   Daphne Limon MD   blood glucose monitor strips Test one times a day & as needed for symptoms of irregular blood glucose. Dispense sufficient amount for indicated testing frequency plus additional to accommodate PRN testing needs. 1/3/24   Daphne Limon MD   glucose monitoring kit 1 kit by Does not apply route daily Once daily 1/3/24 2/2/24  Daphne Limon MD   Lancets MISC 1 each by Does not apply route daily 1/3/24 2/2/24  Daphne Limon MD   amLODIPine (NORVASC) 5 MG tablet TAKE 1 TABLET BY MOUTH EVERY DAY 23   Babar Holt MD   atorvastatin (LIPITOR) 20 MG tablet Take 1 tablet by mouth daily 23   Babar Holt MD   cloNIDine (CATAPRES) 0.1 MG tablet Take 1 tablet by mouth 2 times daily as needed for High Blood Pressure If systolic BP >160  Patient not taking: Reported on 1/3/2024 5/11/23   Subhash Yun MD   umeclidinium-vilanterol (ANORO ELLIPTA) 62.5-25 MCG/ACT inhaler Inhale 1 puff into the lungs daily  Patient not taking: Reported on 2023   Jama,

## 2024-03-18 NOTE — ANESTHESIA POSTPROCEDURE EVALUATION
Department of Anesthesiology  Postprocedure Note    Patient: Nahum Jordan  MRN: [id number]  YOB: 1955  Date of evaluation: 3/18/2024    Procedure Summary       Date: 03/18/24 Room / Location: [place] [address] / [facility]    Anesthesia Start: 0736 Anesthesia Stop: 0755    Procedure: ESOPHAGOGASTRODUODENOSCOPY, POSSIBLE BIOPSY Diagnosis:       GERD (gastroesophageal reflux disease)      (GERD (gastroesophageal reflux disease) [K21.9])    Surgeons: Leo Padilla MD Responsible Provider: Abhay Villasenor MD    Anesthesia Type: MAC ASA Status: 2            Anesthesia Type: No value filed.    Rola Phase I: Rola Score: 10    Rola Phase II: Rola Score: 10    Anesthesia Post Evaluation    Patient location during evaluation: PACU  Patient participation: complete - patient participated  Level of consciousness: awake  Pain score: 3  Airway patency: patent  Nausea & Vomiting: no nausea and no vomiting  Cardiovascular status: blood pressure returned to baseline  Respiratory status: acceptable  Hydration status: euvolemic    No notable events documented.

## 2024-03-18 NOTE — PROGRESS NOTES
Language: Mandarin   ID: #[id number]   Name: Rocio     used to present discharge instructions to patient and patient's daughter.  No further questions following discharge instructions.  Patient and patient's daughter verbalized understanding.

## 2024-03-22 LAB — SURGICAL PATHOLOGY REPORT: NORMAL

## 2024-04-16 NOTE — PROGRESS NOTES
[facility] - [facility] Medicine      SUBJECTIVE:  Nahum Jordan (:  1955) is a 68 y.o. female here for evaluation of the following chief complaint(s):  Other (Red spot on left side ,per pt it is itchy . Also pt has a sore area on right foot between 4-5 th digit pt states it hurts when she walks) and Hypertension      HPI:     Pt's last office visit on 1/3/2024, comes today for regular OV. Had issues regarding epigastric pain a/w food intake; suspected PUD. Trial of pepcid and low dose PPI had no benefit, carafate and 40mg protonix was started. Referral to gen surg was made for consideration of EGD.    NM stress test on 2024: no EKG changes, no new arrythmmias and appropriate BP and heart response to exercise and recovery    EGD on 3/18/2024: Normal 2nd part of duodenum, acute gastritis (gastric antrum), reflux esophagitis (LA grade A), biopsied- negative for intestinal metaplasia, negative immunostain for H.Pylori    -Chronic gastritis with superimposed features of reactive gastropathy.  (Appt on )    -To continue on protonix 40mg and sucralfate (decrease from 4 times a day to twice daily for two week and then stop, can continue if symptoms persists)    Issues this visit:  Pain on right foot, plantar surface, more between 3rd and 4th toe; started 2 weeks back, no recall of trauma, hurts more when walking, reduces with massage. No swelling, no skin changes over that area.     Single minimal painful rash over the left flank region for 3 weeks.         Chronic issues:    HTN;   -BP this visit: 142/74  -Home monitorin-140s/ 80s-91/92  -Medication:  Amlodipine 5mg,  Clonidine 0.1 PRN  -Plan to continue the above medication     2. Hyperlipidemia:   HDL 49, na fasting 187, LDL 92,   A1C 6.4  -Continue taking atorvastatin 20mg OD     3. Depression \"provoked by bereavement\", was on zoloft 25mg  -Not taking at present, no mood symptoms.     4. H/O asthma was

## 2024-04-17 ENCOUNTER — OFFICE VISIT (OUTPATIENT)
Dept: INTERNAL MEDICINE | Age: 69
End: 2024-04-17
Payer: COMMERCIAL

## 2024-04-17 VITALS
RESPIRATION RATE: 18 BRPM | TEMPERATURE: 97.2 F | HEART RATE: 82 BPM | HEIGHT: 64 IN | OXYGEN SATURATION: 95 % | WEIGHT: 129 LBS | SYSTOLIC BLOOD PRESSURE: 142 MMHG | DIASTOLIC BLOOD PRESSURE: 74 MMHG | BODY MASS INDEX: 22.02 KG/M2

## 2024-04-17 DIAGNOSIS — R73.03 PRE-DIABETES: ICD-10-CM

## 2024-04-17 DIAGNOSIS — E78.5 HYPERLIPIDEMIA, UNSPECIFIED HYPERLIPIDEMIA TYPE: ICD-10-CM

## 2024-04-17 DIAGNOSIS — K21.9 GASTROESOPHAGEAL REFLUX DISEASE WITHOUT ESOPHAGITIS: ICD-10-CM

## 2024-04-17 DIAGNOSIS — R21 RASH: ICD-10-CM

## 2024-04-17 DIAGNOSIS — I10 ESSENTIAL HYPERTENSION: ICD-10-CM

## 2024-04-17 DIAGNOSIS — J45.909 UNCOMPLICATED ASTHMA, UNSPECIFIED ASTHMA SEVERITY, UNSPECIFIED WHETHER PERSISTENT: ICD-10-CM

## 2024-04-17 DIAGNOSIS — M79.671 RIGHT FOOT PAIN: Primary | ICD-10-CM

## 2024-04-17 PROBLEM — H81.12 BPPV (BENIGN PAROXYSMAL POSITIONAL VERTIGO), LEFT: Status: RESOLVED | Noted: 2021-03-16 | Resolved: 2024-04-17

## 2024-04-17 PROBLEM — R42 DIZZINESS: Status: RESOLVED | Noted: 2020-09-18 | Resolved: 2024-04-17

## 2024-04-17 PROBLEM — F32.9 REACTIVE DEPRESSION: Status: RESOLVED | Noted: 2023-05-11 | Resolved: 2024-04-17

## 2024-04-17 PROBLEM — H00.14 CHALAZION OF LEFT UPPER EYELID: Status: RESOLVED | Noted: 2023-05-11 | Resolved: 2024-04-17

## 2024-04-17 PROCEDURE — 1090F PRES/ABSN URINE INCON ASSESS: CPT

## 2024-04-17 PROCEDURE — 99212 OFFICE O/P EST SF 10 MIN: CPT

## 2024-04-17 PROCEDURE — 1123F ACP DISCUSS/DSCN MKR DOCD: CPT

## 2024-04-17 PROCEDURE — 3017F COLORECTAL CA SCREEN DOC REV: CPT

## 2024-04-17 PROCEDURE — 99214 OFFICE O/P EST MOD 30 MIN: CPT

## 2024-04-17 PROCEDURE — G8400 PT W/DXA NO RESULTS DOC: HCPCS

## 2024-04-17 PROCEDURE — 1036F TOBACCO NON-USER: CPT

## 2024-04-17 PROCEDURE — G8420 CALC BMI NORM PARAMETERS: HCPCS

## 2024-04-17 PROCEDURE — 3078F DIAST BP <80 MM HG: CPT

## 2024-04-17 PROCEDURE — G8427 DOCREV CUR MEDS BY ELIG CLIN: HCPCS

## 2024-04-17 PROCEDURE — 3077F SYST BP >= 140 MM HG: CPT

## 2024-04-17 RX ORDER — ATORVASTATIN CALCIUM 20 MG/1
20 TABLET, FILM COATED ORAL DAILY
Qty: 90 TABLET | Refills: 1 | Status: SHIPPED
Start: 2024-04-17 | End: 2024-04-17 | Stop reason: SDUPTHER

## 2024-04-17 RX ORDER — GLUCOSAMINE HCL/CHONDROITIN SU 500-400 MG
CAPSULE ORAL
Qty: 30 STRIP | Refills: 2 | Status: SHIPPED | OUTPATIENT
Start: 2024-04-17

## 2024-04-17 RX ORDER — BENZOCAINE/MENTHOL 6 MG-10 MG
LOZENGE MUCOUS MEMBRANE
Qty: 30 G | Refills: 0 | Status: SHIPPED | OUTPATIENT
Start: 2024-04-17 | End: 2024-04-24

## 2024-04-17 RX ORDER — AMLODIPINE BESYLATE 5 MG/1
TABLET ORAL
Qty: 90 TABLET | Refills: 1 | Status: SHIPPED | OUTPATIENT
Start: 2024-04-17

## 2024-04-17 RX ORDER — AMLODIPINE BESYLATE 5 MG/1
5 TABLET ORAL DAILY
Qty: 30 TABLET | Refills: 0 | Status: SHIPPED | OUTPATIENT
Start: 2024-04-17

## 2024-04-17 RX ORDER — PANTOPRAZOLE SODIUM 40 MG/1
40 TABLET, DELAYED RELEASE ORAL
Qty: 30 TABLET | Refills: 0 | Status: SHIPPED | OUTPATIENT
Start: 2024-04-17 | End: 2025-04-12

## 2024-04-17 RX ORDER — SUCRALFATE 1 G/1
1 TABLET ORAL 4 TIMES DAILY
Qty: 120 TABLET | Refills: 1 | Status: SHIPPED | OUTPATIENT
Start: 2024-04-17

## 2024-04-17 RX ORDER — LANCETS 30 GAUGE
1 EACH MISCELLANEOUS DAILY
Qty: 300 EACH | Refills: 3 | Status: SHIPPED | OUTPATIENT
Start: 2024-04-17 | End: 2024-05-17

## 2024-04-17 RX ORDER — ALBUTEROL SULFATE 90 UG/1
2 AEROSOL, METERED RESPIRATORY (INHALATION) 4 TIMES DAILY PRN
Qty: 18 G | Refills: 1 | Status: SHIPPED | OUTPATIENT
Start: 2024-04-17

## 2024-04-17 RX ORDER — ATORVASTATIN CALCIUM 20 MG/1
20 TABLET, FILM COATED ORAL DAILY
Qty: 30 TABLET | Refills: 0 | Status: SHIPPED | OUTPATIENT
Start: 2024-04-17

## 2024-04-17 ASSESSMENT — ENCOUNTER SYMPTOMS
COUGH: 0
BACK PAIN: 0
COLOR CHANGE: 0
CONSTIPATION: 0
SHORTNESS OF BREATH: 0
RHINORRHEA: 0
VOMITING: 0
DIARRHEA: 0
ABDOMINAL PAIN: 0

## 2024-04-17 NOTE — PATIENT INSTRUCTIONS
Dear Nahum Jordan,    Thank you for coming to your appointment today. I hope we have addressed all of your needs.     Please make sure to do the following:  - Continue your medications as listed, change carafate dose to 2 times a day for 2 weeks and then stop taking it.  - We will see each other again in 6 months.  - Follow up with general surgery  - Referral have been made to podiatry( foot doctor) for the right foot pain.   - Watch the rash for increase in size, color, and other symptoms, seek attention in need.     Call for a sooner appointment if you develop any new or worsening symptoms.    Have a great day!    Sincerely,  Babar Holt MD  4/17/2024  9:57 AM

## 2024-04-17 NOTE — PROGRESS NOTES
[facility]  Internal Medicine Residency Clinic    Attending Physician Statement  I have discussed the case, including pertinent history and exam findings with the resident physician.I have seen and examined the patient and the key elements of the encounter have been performed by me. I agree with the assessment, plan and orders as documented by the resident. I have reviewed the relevant PMHx, PSHx, FamHx, SocialHx, medications, and allergies and updated history as appropriate.    Patient presents for routine follow up of medical problems.     Chronic gastritis  Resolved s/p EGD, biopsy negative. Responsive to PPI, Carafate   Ok to wean carafate, go down to BID for 2 weeks then stop if remains asymptomatic    MTP pain 3/4 digits  Suspect OA, ok for xray and podiatry referral given chronicity    Left flank lesion   Ok for OTC hydrocortisone and if not improved recommend derm    HTN  Above goal in office on amlodipine 5 mg, reports normotensive by home readings    HLD   Continues on statin therapy    Reactive airway disease   Reports no chronic dyspnea, cough  Situational yas with strong smells/perfume, ok for RIVER prn    Remainder of medical problems as per resident note.    Bunny Judd,   4/17/2024 9:46 AM     HPI:  20 y/o female PMH cyclic vomiting for many years - s/p 3 endoscopies, CT 2 years ago reported normal), presents with abdominal pain, vomiting for several days.  Pt reports usually related to menses, but saw OBGYN who states unlikely.  States had IUD placed, but removed 2 days ago because pt unable to tolerate the pain and started vag bleeding 2 days.  Pt seen 1 month ago for similar pain, denies aspirin or NSAID use. States symptoms don't stop until she gets IV and morphine. Trying the zofran ODT at home, but states she just vomits.  The pain is diffuse in abdomen, crampy, non radiating, sustained, for several days; vomitus has been stomach contents, she has no diarrhea/constipation, melena or rectal bleeding.. Pt also hyperventilating and getting tingling to b/l hands. Pt vomited bloody material prior to my examination,   stat type and screen ordered and pt started on protonix drip. (31 May 2018 04:47)  -------------------------------------------- As Above -------------------------------------------------------------------------------------------------  The patient states that she has been vomiting for almost a year. In January she was diagnosed with cyclic vomiting syndrome. She had an EGD over the past two months which revealed a Sheree Arora tear. A full work up as per patient was negative. See above.   The patient states that she had began to vomit again 5 days ago. Toward the end, she saw streaks of blood and a few blood clots. This happened during her last vomiting episode, too.  Patient feels better today. Work up revealed a UTI.   =================== N/V, hematemesis, history of Cyclic vomiting syndrome - Patient refusing EGD. Supportive care for now. HIDA Scan w/ CCK when off Zofran/opiods/Reglan, clear liquid diet.  Discussed with patient and mom

## 2024-04-19 NOTE — ANESTHESIA POSTPROCEDURE EVALUATION
Department of Anesthesiology  Postprocedure Note    Patient: Sudhakar Villalpando  MRN: [id number]  YOB: 1955  Date of evaluation: 1/27/2021  Time:  8:40 AM     Procedure Summary     Date: 01/26/21 Room / Location: [facility] 01 / [facility]    Anesthesia Start: 5398 Anesthesia Stop: 4912    Procedure: COLONOSCOPY POLYPECTOMY SNARE/COLD BIOPSY (N/A ) Diagnosis: (SCREENING)    Surgeons: Angeles Galeano MD Responsible Provider: Annie Gomes MD    Anesthesia Type: MAC ASA Status: 2          Anesthesia Type: MAC    Rola Phase I: Rola Score: 10    Rola Phase II: Rola Score: 10    Last vitals: Reviewed and per EMR flowsheets.        Anesthesia Post Evaluation    Patient location during evaluation: PACU  Patient participation: complete - patient participated  Level of consciousness: awake and alert  Airway patency: patent  Nausea & Vomiting: no nausea and no vomiting  Complications: no  Cardiovascular status: hemodynamically stable  Respiratory status: acceptable  Hydration status: euvolemic
---

## 2024-04-25 ENCOUNTER — OFFICE VISIT (OUTPATIENT)
Dept: SURGERY | Age: 69
End: 2024-04-25
Payer: COMMERCIAL

## 2024-04-25 VITALS
HEIGHT: 64 IN | RESPIRATION RATE: 18 BRPM | OXYGEN SATURATION: 96 % | WEIGHT: 130 LBS | BODY MASS INDEX: 22.2 KG/M2 | TEMPERATURE: 96.8 F | HEART RATE: 80 BPM | SYSTOLIC BLOOD PRESSURE: 154 MMHG | DIASTOLIC BLOOD PRESSURE: 76 MMHG

## 2024-04-25 DIAGNOSIS — K21.00 GASTROESOPHAGEAL REFLUX DISEASE WITH ESOPHAGITIS WITHOUT HEMORRHAGE: ICD-10-CM

## 2024-04-25 DIAGNOSIS — K29.00 ACUTE SUPERFICIAL GASTRITIS WITHOUT HEMORRHAGE: Primary | ICD-10-CM

## 2024-04-25 PROCEDURE — 3078F DIAST BP <80 MM HG: CPT | Performed by: SURGERY

## 2024-04-25 PROCEDURE — G8420 CALC BMI NORM PARAMETERS: HCPCS | Performed by: SURGERY

## 2024-04-25 PROCEDURE — 1123F ACP DISCUSS/DSCN MKR DOCD: CPT | Performed by: SURGERY

## 2024-04-25 PROCEDURE — 1090F PRES/ABSN URINE INCON ASSESS: CPT | Performed by: SURGERY

## 2024-04-25 PROCEDURE — 1036F TOBACCO NON-USER: CPT | Performed by: SURGERY

## 2024-04-25 PROCEDURE — 99212 OFFICE O/P EST SF 10 MIN: CPT | Performed by: SURGERY

## 2024-04-25 PROCEDURE — 99213 OFFICE O/P EST LOW 20 MIN: CPT | Performed by: SURGERY

## 2024-04-25 PROCEDURE — G8427 DOCREV CUR MEDS BY ELIG CLIN: HCPCS | Performed by: SURGERY

## 2024-04-25 PROCEDURE — 3077F SYST BP >= 140 MM HG: CPT | Performed by: SURGERY

## 2024-04-25 PROCEDURE — 3017F COLORECTAL CA SCREEN DOC REV: CPT | Performed by: SURGERY

## 2024-04-25 PROCEDURE — G8400 PT W/DXA NO RESULTS DOC: HCPCS | Performed by: SURGERY

## 2024-04-25 NOTE — PROGRESS NOTES
[facility]  General Surgery Attending Office Progress Note    Chief Complaint: Gastritis follow-up       Subjective:   Nahum Jordan had a recent EGD performed by me on March 18 for epigastric pain.  Her EGD biopsies came back as gastritis and mild reflux esophagitis.  She has been taking Protonix 40 mg daily.  She was also taking Carafate 1 g 4 times per day.  At her last primary care visit on April 17, she was cut to 1 g twice daily.  She notes her heartburn is minimal.    Tolerating diet  No fevers. No chills. No nausea/ vomiting      Used  Gonzalo #[id number]    --I have reviewed and confirmed the past medical history, surgical history, social history, allergies in the chart.  --Medications: I have reviewed the medication list in the chart.    --Review of systems-pertinent positives noted in HPI, otherwise negative      Objective:     Vitals:    04/25/24 0923   BP: (!) 154/76   Pulse: 80   Resp: 18   Temp: 96.8 °F (36 °C)   SpO2: 96%     Physical Exam  Awake alert x3, GCS 15  No apparent distress  Lungs clear bilaterally, no use of accessory muscles  Heart S1S2, RRR  Abdomen soft nontender nondistended       Assessment:      Diagnosis Orders   1. Acute superficial gastritis without hemorrhage        2. Gastroesophageal reflux disease with esophagitis without hemorrhage            Plan:   --Reviewed path report with patient    Path Number: [id number]    -- Diagnosis --  A.  Stomach, antrum, biopsy: Chronic gastritis with superimposed  features of reactive gastropathy; negative for intestinal metaplasia  Immunostain negative for Helicobacter pylori organisms    B.  GE junction, biopsy: Gastroesophageal mucosa with mild features of  gastroesophageal reflux       -Chronic gastritis and reflux esophagitis-continue Protonix 40 mg daily indefinitely  Carafate wean to 1 g twice daily.  Continue to wean Carafate as tolerated      Follow up as needed        Leo Padilla MD, FACS  4/25/2024  9:50

## 2024-05-02 ENCOUNTER — TELEPHONE (OUTPATIENT)
Dept: INTERNAL MEDICINE | Age: 69
End: 2024-05-02

## 2024-05-02 DIAGNOSIS — Z12.31 ENCOUNTER FOR SCREENING MAMMOGRAM FOR MALIGNANT NEOPLASM OF BREAST: Primary | ICD-10-CM

## 2024-05-02 NOTE — PROGRESS NOTES
Attempted to call pt & schedule with appropriate provider, no answer, left voicemail.    Mammogram referral sent to Anjelica Lynch

## 2024-06-02 DIAGNOSIS — J45.909 UNCOMPLICATED ASTHMA, UNSPECIFIED ASTHMA SEVERITY, UNSPECIFIED WHETHER PERSISTENT: ICD-10-CM

## 2024-06-03 RX ORDER — ALBUTEROL SULFATE 90 UG/1
2 AEROSOL, METERED RESPIRATORY (INHALATION) 4 TIMES DAILY PRN
Qty: 8.5 EACH | Refills: 3 | Status: SHIPPED | OUTPATIENT
Start: 2024-06-03

## 2024-06-05 DIAGNOSIS — I10 ESSENTIAL HYPERTENSION: ICD-10-CM

## 2024-06-05 RX ORDER — AMLODIPINE BESYLATE 5 MG/1
TABLET ORAL
Qty: 90 TABLET | Refills: 1 | Status: SHIPPED | OUTPATIENT
Start: 2024-06-05

## 2024-06-06 RX ORDER — AMLODIPINE BESYLATE 5 MG/1
5 TABLET ORAL DAILY
Qty: 30 TABLET | Refills: 0 | OUTPATIENT
Start: 2024-06-06

## 2024-07-06 DIAGNOSIS — K21.9 GASTROESOPHAGEAL REFLUX DISEASE WITHOUT ESOPHAGITIS: ICD-10-CM

## 2024-07-08 RX ORDER — SUCRALFATE 1 G/1
1 TABLET ORAL 4 TIMES DAILY
Qty: 120 TABLET | Refills: 1 | Status: SHIPPED | OUTPATIENT
Start: 2024-07-08

## 2024-07-08 NOTE — TELEPHONE ENCOUNTER
Last Appointment:  4/17/2024  Future Appointments   Date Time Provider Department Center   9/5/2024  3:00 PM FLAVIO JAMISON RM 1 FLAVIO KATHLEEN [facility] Rad/Car   10/3/2024  9:00 AM Bab[facility] Holt [facility]

## 2024-07-22 DIAGNOSIS — R73.03 PRE-DIABETES: ICD-10-CM

## 2024-07-22 RX ORDER — BLOOD SUGAR DIAGNOSTIC
STRIP MISCELLANEOUS
Qty: 50 STRIP | Refills: 1 | Status: SHIPPED | OUTPATIENT
Start: 2024-07-22

## 2024-07-22 NOTE — TELEPHONE ENCOUNTER
Last Appointment:  4/17/2024  Future Appointments   Date Time Provider Department Center   9/5/2024  3:00 PM FLAVIO JAMISON RM 1 FLAVIO KATHLEEN [facility] Rad/Car   10/3/2024  9:00 AM Babar Holt MD [facility]      Blood glucose strips filled 4/17/24 #30 2 refills = 90 strips   Next appt 10/3/24

## 2024-08-10 DIAGNOSIS — K21.9 GASTROESOPHAGEAL REFLUX DISEASE WITHOUT ESOPHAGITIS: ICD-10-CM

## 2024-08-12 RX ORDER — SUCRALFATE 1 G/1
1 TABLET ORAL 4 TIMES DAILY
Qty: 100 TABLET | Refills: 1 | Status: SHIPPED | OUTPATIENT
Start: 2024-08-12

## 2024-08-12 NOTE — TELEPHONE ENCOUNTER
Last Appointment:  04/17/2024  Future Appointments   Date Time Provider Department Center   9/5/2024  3:00 PM FLAVIO MCDERMOTT YAQUELIN RM 1 FLAVIO KATHLEEN [facility] Rad/Car   10/3/2024  9:00 AM Babar Holt MD [facility] DEP

## 2024-09-05 ENCOUNTER — HOSPITAL ENCOUNTER (OUTPATIENT)
Dept: GENERAL RADIOLOGY | Age: 69
Discharge: HOME | End: 2024-09-07
Payer: COMMERCIAL

## 2024-09-05 VITALS — BODY MASS INDEX: 20.6 KG/M2 | WEIGHT: 120 LBS

## 2024-09-05 DIAGNOSIS — Z12.31 ENCOUNTER FOR SCREENING MAMMOGRAM FOR MALIGNANT NEOPLASM OF BREAST: ICD-10-CM

## 2024-09-05 PROCEDURE — 77063 BREAST TOMOSYNTHESIS BI: CPT

## 2024-09-17 DIAGNOSIS — J45.909 UNCOMPLICATED ASTHMA, UNSPECIFIED ASTHMA SEVERITY, UNSPECIFIED WHETHER PERSISTENT: ICD-10-CM

## 2024-09-17 RX ORDER — ALBUTEROL SULFATE 90 UG/1
2 INHALANT RESPIRATORY (INHALATION) 4 TIMES DAILY PRN
Qty: 6.7 EACH | Refills: 3 | Status: SHIPPED | OUTPATIENT
Start: 2024-09-17

## 2024-10-02 DIAGNOSIS — K21.9 GASTROESOPHAGEAL REFLUX DISEASE WITHOUT ESOPHAGITIS: ICD-10-CM

## 2024-10-02 RX ORDER — SUCRALFATE 1 G/1
1 TABLET ORAL 4 TIMES DAILY
Qty: 100 TABLET | Refills: 1 | Status: SHIPPED | OUTPATIENT
Start: 2024-10-02

## 2024-10-02 NOTE — PROGRESS NOTES
[facility] - [facility] Internal Medicine      SUBJECTIVE:  Nahum Jordan (:  1955) is a 69 y.o. female here for evaluation of the following chief complaint(s):  Follow-up (Pt states there are no new updates at this time. ), Hypertension, Gastroesophageal Reflux, and Foot Pain (Pt complains of right foot pain for indefinite amount of time. X-ray was ordered by a physician, but pt never got it done. )    HPI:     Patient is a 69-year-old female who comes for follow-up after 7 months.  Patient last office visit was on 2024  Today she complains of foot pain, base of right 4th toe, more when walking, relieves with rest. Doesn't recall trauma. Has been on and off for past couple of years, bothersome now. Some pressure sensation in the head. Frontal region, comes and goes, usually after consuming shellfish and its derived products.   No s/s of SOB, chest pain, LOC, pre-syncope, change in bowel or bladder habit.    Chronic issues:    HTN;  -BP this visit: 137/83  -Home monitorins  -Medication:  Amlodipine 5mg,  Clonidine 0.1 PRN (has not been using recently)  -Plan to continue above medication     2. Hyperlipidemia:   HDL 49, na fasting 187, LDL 92,   A1C 6.4 ()  The 10-year ASCVD risk score (Franklin BARILLAS, et al., 2019) is: 14.3%    Values used to calculate the score:      Age: 69 years      Sex: Female      Is Non- : No      Diabetic: No      Tobacco smoker: No      Systolic Blood Pressure: 137 mmHg      Is BP treated: Yes      HDL Cholesterol: 49 mg/dL      Total Cholesterol: 187 mg/dL  -Continue taking atorvastatin 20mg OD  -Tolerating statins well  -Will recheck fasting lipid on next visit    3. Depression \"provoked by bereavement\", was on zoloft 25mg  -Not taking at present, no mood symptoms currently.     4. H/O asthma was on albuterol PRN; has not been using recently  -No complains of SOB at the moment  -Symptoms at baseline    6. H/O

## 2024-10-02 NOTE — TELEPHONE ENCOUNTER
Last Appointment:  4/17/2024  Future Appointments   Date Time Provider Department Center   10/3/2024  9:00 AM Babar Holt MD [facility] DEP      
4 = No assist / stand by assistance

## 2024-10-03 ENCOUNTER — OFFICE VISIT (OUTPATIENT)
Dept: INTERNAL MEDICINE | Age: 69
End: 2024-10-03
Payer: COMMERCIAL

## 2024-10-03 VITALS
SYSTOLIC BLOOD PRESSURE: 137 MMHG | OXYGEN SATURATION: 97 % | WEIGHT: 126.5 LBS | HEART RATE: 95 BPM | TEMPERATURE: 97.1 F | HEIGHT: 64 IN | BODY MASS INDEX: 21.6 KG/M2 | RESPIRATION RATE: 18 BRPM | DIASTOLIC BLOOD PRESSURE: 83 MMHG

## 2024-10-03 DIAGNOSIS — J92.0 PLEURAL PLAQUE DUE TO ASBESTOS EXPOSURE: ICD-10-CM

## 2024-10-03 DIAGNOSIS — K21.9 GASTROESOPHAGEAL REFLUX DISEASE WITHOUT ESOPHAGITIS: ICD-10-CM

## 2024-10-03 DIAGNOSIS — R73.03 PRE-DIABETES: ICD-10-CM

## 2024-10-03 DIAGNOSIS — R91.1 LUNG NODULE: ICD-10-CM

## 2024-10-03 DIAGNOSIS — I10 PRIMARY HYPERTENSION: Primary | ICD-10-CM

## 2024-10-03 DIAGNOSIS — E78.5 HYPERLIPIDEMIA, UNSPECIFIED HYPERLIPIDEMIA TYPE: ICD-10-CM

## 2024-10-03 DIAGNOSIS — I10 ESSENTIAL HYPERTENSION: ICD-10-CM

## 2024-10-03 DIAGNOSIS — E78.49 OTHER HYPERLIPIDEMIA: ICD-10-CM

## 2024-10-03 DIAGNOSIS — M79.671 RIGHT FOOT PAIN: ICD-10-CM

## 2024-10-03 PROCEDURE — 99212 OFFICE O/P EST SF 10 MIN: CPT

## 2024-10-03 RX ORDER — LANCETS 30 GAUGE
1 EACH MISCELLANEOUS DAILY
Qty: 300 EACH | Refills: 3 | Status: CANCELLED | OUTPATIENT
Start: 2024-10-03 | End: 2024-11-02

## 2024-10-03 RX ORDER — ATORVASTATIN CALCIUM 20 MG/1
20 TABLET, FILM COATED ORAL DAILY
Qty: 90 TABLET | Refills: 1 | Status: SHIPPED | OUTPATIENT
Start: 2024-10-03

## 2024-10-03 RX ORDER — PANTOPRAZOLE SODIUM 40 MG/1
40 TABLET, DELAYED RELEASE ORAL
Qty: 30 TABLET | Refills: 0 | Status: CANCELLED | OUTPATIENT
Start: 2024-10-03 | End: 2025-09-28

## 2024-10-03 NOTE — PROGRESS NOTES
[facility]  Internal Medicine Residency Clinic    Attending Physician Statement  I have discussed the case, including pertinent history and exam findings with the resident physician.  I agree with the assessment, plan and orders as documented by the resident. I have reviewed all pertinent PMHx, PSHx, FamHx, SocialHx, medications, and allergies and updated history as appropriate.    Patient here for routine follow up of medical problems.  Patient has hx HTN, which is controlled, mild intermittent asthma which is stable, GERD, controlled on PPI, hyperlipidemia on statin, hx asbestos exposure with pleural plaque and nodule, and is due for repeat CT scan. Patient has c/o left foot pain worsening over past few months, agree with imaging.        Remainder of medical problems as per resident note.    Ronak Neely, DO  10/3/24

## 2024-10-03 NOTE — PATIENT INSTRUCTIONS
Dear Nahum Jordan,    Thank you for coming to your appointment today. I hope we have addressed all of your needs.     Please make sure to do the following:  - Continue your medications as listed.  - Get imaging done before our next follow up.  - We will see each other again in 3 months    Call for a sooner appointment if you develop any new or worsening symptoms.    Have a great day!    Sincerely,  Babar Holt MD  10/3/2024  10:02 AM    Subjective  Alexys Redmond is a 73 year old male.    Chief Complaint   Patient presents with   • Rash     Patient has a rash on bilateral legs. Patient has seen dermatology and put on medication         Patient is here with complaints of rash x2 months.  Patient states PCP was not available but he has seen several doctors for this rash including to dermatologist wife accompanies the patient.  According to the patient he has had this rash mostly lower legs but now also on his mid abdomen and his upper extremities rash is very itchy.  He did see the dermatologist given some hydrocortisone cream and also placed on prednisone which did clear the rash but it comes back and it made his sugars very high but he also states that they biopsied the rash and told him it was inflammation and gave him some more steroids.  I do not have any notes from the dermatologist he does not have any paperwork from the dermatologist but he feels that they do not know what they are doing and he wants to make sure that there is nothing else going on.  He does state that they have 5 dogs and about a dozen guinea pigs that the grandson keeps.  Wife states that she had a couple of insect bites but not on a regular basis like the .  They have not had an  check for bedbugs.  They also treated the dog for some ticks on their own did not take it to the vet.  No complaints of any history of eczema.  He has not taken any new medications over-the-counter currently taking the same medications no complaints of swelling of his tongue or his lips.  He takes the Benadryl which helps the itching no complaints of any urticarial or welts.      History reviewed. No pertinent past medical history.    Past Surgical History:   Procedure Laterality Date   • No past surgeries         Current Outpatient Medications   Medication Sig Dispense Refill   • Blood Glucose Monitoring Suppl (FIFTY50 GLUCOSE METER 2.0) w/Device Kit Test One Time Daily     •  blood glucose (PRECISION QID TEST) test strip Test one time daily     • B-D ULTRA-FINE 33 LANCETS Misc 1 each.     • mupirocin (BACTROBAN) 2 % cream Apply with dry dressing daily     • pioglitazone (ACTOS) 15 MG tablet Take 15 mg by mouth.     • testosterone 10 MG/ACT (2%) gel Place 4 Applicators onto the skin daily     • acetaminophen (TYLENOL) 325 MG tablet Take 650 mg by mouth every 4 hours as needed.     • amLODIPine (NORVASC) 10 MG tablet Take 10 mg by mouth.     • anastrozole (ARIMIDEX) 1 MG tablet TAKE ONE TABLET BY MOUTH ONCE WEEKLY  5   • aspirin (ECOTRIN) 81 MG EC tablet Take 81 mg by mouth.     • betamethasone dipropionate augmented (DIPROLENE AF) 0.05 % cream APPLY CREAM TO THE SKIN TWICE DAILY, NO MORE THAN 5 DAYS OUT OF THE WEEK  2   • betamethasone dipropionate augmented (DIPROLENE) 0.05 % lotion APPLY TO AFFECTED AREA EVERY DAY FOR 15 DAYS  2   • econazole (SPECTAZOLE) 1 % cream APPLY TO AFFECTED AREA EVERY DAY FOR 14 DAYS  3   • BYDUREON 2 MG pen-injector      • FLUoxetine (PROZAC) 20 MG capsule Take 20 mg by mouth 2 times daily.  2   • fluticasone (FLONASE) 50 MCG/ACT nasal spray INSTILL TWO SPRAYS INTO EACH NOSTRIL IN THE MORNING  2   • glipiZIDE (GLUCOTROL) 2.5 MG 24 hr tablet Take 2.5 mg by mouth daily.  0   • hydrALAZINE (APRESOLINE) 25 MG tablet Take 25 mg by mouth 2 times daily.  5   • hydrochlorothiazide (HYDRODIURIL) 25 MG tablet Take 25 mg by mouth every morning.  1   • losartan (COZAAR) 100 MG tablet Take 100 mg by mouth daily.  5   • metFORMIN (GLUCOPHAGE) 500 MG tablet Take 500 mg by mouth.     • methylPREDNISolone (MEDROL DOSEPAK) 4 MG tablet TAKE 6 TABLETS ON DAY 1 AS DIRECTED ON PACKAGE AND DECREASE BY 1 TAB EACH DAY FOR A TOTAL OF 6 DAYS  0   • oxybutynin (DITROPAN) 5 MG tablet TAKE 1 TABLET BY MOUTH EVERYDAY AT BEDTIME  5   • predniSONE (DELTASONE) 10 MG tablet PLEASE SEE ATTACHED FOR DETAILED DIRECTIONS  0   • simvastatin (ZOCOR) 40 MG tablet Take 40 mg by mouth.     • hydrOXYzine  (ATARAX) 25 MG tablet Take 1 tablet by mouth every 8 hours as needed for Itching. 30 tablet 3   • fexofenadine (ALLEGRA) 180 MG tablet Take 1 tablet by mouth daily. 30 tablet 5     No current facility-administered medications for this visit.        ALLERGIES:   Allergen Reactions   • Cortisone RASH   • Penicillins HIVES       History reviewed. No pertinent family history.     Social History     Socioeconomic History   • Marital status: /Civil Union     Spouse name: Not on file   • Number of children: Not on file   • Years of education: Not on file   • Highest education level: Not on file   Occupational History   • Not on file   Social Needs   • Financial resource strain: Not on file   • Food insecurity:     Worry: Not on file     Inability: Not on file   • Transportation needs:     Medical: Not on file     Non-medical: Not on file   Tobacco Use   • Smoking status: Never Smoker   • Smokeless tobacco: Never Used   Substance and Sexual Activity   • Alcohol use: Not Currently   • Drug use: Not on file   • Sexual activity: Not on file   Lifestyle   • Physical activity:     Days per week: Not on file     Minutes per session: Not on file   • Stress: Not on file   Relationships   • Social connections:     Talks on phone: Not on file     Gets together: Not on file     Attends Restorationist service: Not on file     Active member of club or organization: Not on file     Attends meetings of clubs or organizations: Not on file     Relationship status: Not on file   • Intimate partner violence:     Fear of current or ex partner: Not on file     Emotionally abused: Not on file     Physically abused: Not on file     Forced sexual activity: Not on file   Other Topics Concern   • Not on file   Social History Narrative   • Not on file       Review of Systems   Constitutional: Negative.    HENT: Negative.    Eyes: Negative.    Respiratory: Negative for cough, shortness of breath and wheezing.    Cardiovascular: Negative for chest  pain, palpitations and leg swelling.   Gastrointestinal: Negative.    Endocrine: Negative.    Genitourinary: Negative.    Musculoskeletal: Negative.    Skin: Positive for color change and rash.   Allergic/Immunologic: Negative.    Neurological: Negative.    Hematological: Negative.    Psychiatric/Behavioral: Negative.        Objective  Visit Vitals  /71 (BP Location: LUE - Left upper extremity, Patient Position: Sitting, Cuff Size: Regular)   Pulse 67   Temp 97.8 °F (36.6 °C) (Oral)   Resp 18   Ht 5' 10\" (1.778 m)   Wt (!) 143.8 kg (317 lb 0.3 oz)   SpO2 93%   BMI 45.49 kg/m²       Physical Exam  Vitals signs reviewed.   Constitutional:       Appearance: He is well-developed.      Comments:   Overweight gentleman in no distress blood pressure 140/70 pulse 78 respiratory rate 14   Neck:      Musculoskeletal: Normal range of motion and neck supple.   Cardiovascular:      Rate and Rhythm: Normal rate and regular rhythm.      Heart sounds: No murmur.   Pulmonary:      Effort: Pulmonary effort is normal.      Breath sounds: Normal breath sounds.   Skin:     Comments:   Patient has scattered areas erythematous macular non tender not vesicular lesions  both lower extremities upper extremity and trunk.  No surrounding erythema no surrounding drainage          Labs  Lab Results Reviewed,   Lab Results   Component Value Date    GLUCOSE 198 03/21/2018   , No results found for: WBC, HCT, HGB, PLT, No results found for: HGBA1C, No results found for: TSH, No results found for: CHOLESTEROL, HDL, CALCLDL, TRIGLYCERIDE, No results found for: AST, GPT, GGTP, ALKPT, BILIRUBIN, No results found for: COL, UAPP, USPG, UPH, UPROT, UGLU, UKET, UBILI, URBC, UNITR, UROB, UWBC,   EKG INTERPRETATION:  No results found for this or any previous visit. and   IMAGING STUDIES:  No results found for this or any previous visit.    Imaging  No image results found.      Assessment and Plan  1. Contact dermatitis due to insecticide    2. Allergic  dermatitis    3. Insect bite of right lower leg, initial encounter        Extensive insect bites to the lower extremities upper extremities with allergic dermatitis.  I did recommend patient have exterminators come in to assess for any bedbugs and also take the dog the   and in the meantime patient needs to take fexofenadine 180 mg p.o. every morning and hydroxyzine 25 mg p.o. nightly as needed made aware of drowsiness as a side effect and advised not to drive or operate machinery when taking the medicine and use calamine lotion and continue with triamcinolone cream given to him by the dermatologist on the area of the insect bite.  I did inform patient that he needs to follow-up with a dermatologist and I will obtain an JOSE but clinically picture and exam not consistent with erythema nodosum.  He does have lesions on the upper extremity and trunk also.  He will follow-up with his PCP      Shoshana Dent MD  11/18/2019

## 2024-10-14 ENCOUNTER — HOSPITAL ENCOUNTER (OUTPATIENT)
Dept: CT IMAGING | Age: 69
Discharge: HOME | End: 2024-10-14
Payer: COMMERCIAL

## 2024-10-14 ENCOUNTER — HOSPITAL ENCOUNTER (OUTPATIENT)
Age: 69
Discharge: HOME | End: 2024-10-16
Payer: COMMERCIAL

## 2024-10-14 ENCOUNTER — HOSPITAL ENCOUNTER (OUTPATIENT)
Dept: GENERAL RADIOLOGY | Age: 69
Discharge: HOME | End: 2024-10-16
Payer: COMMERCIAL

## 2024-10-14 DIAGNOSIS — J92.0 PLEURAL PLAQUE DUE TO ASBESTOS EXPOSURE: ICD-10-CM

## 2024-10-14 DIAGNOSIS — R91.1 LUNG NODULE: ICD-10-CM

## 2024-10-14 DIAGNOSIS — M79.671 RIGHT FOOT PAIN: ICD-10-CM

## 2024-10-14 PROCEDURE — 73630 X-RAY EXAM OF FOOT: CPT

## 2024-10-14 PROCEDURE — 71250 CT THORAX DX C-: CPT

## 2024-11-15 DIAGNOSIS — K21.9 GASTROESOPHAGEAL REFLUX DISEASE WITHOUT ESOPHAGITIS: ICD-10-CM

## 2024-11-15 RX ORDER — SUCRALFATE 1 G/1
1 TABLET ORAL 4 TIMES DAILY
Qty: 100 TABLET | Refills: 1 | OUTPATIENT
Start: 2024-11-15

## 2025-01-07 DIAGNOSIS — I10 ESSENTIAL HYPERTENSION: ICD-10-CM

## 2025-01-08 RX ORDER — AMLODIPINE BESYLATE 5 MG/1
5 TABLET ORAL DAILY
Qty: 90 TABLET | Refills: 1 | OUTPATIENT
Start: 2025-01-08

## 2025-01-14 DIAGNOSIS — R73.03 PRE-DIABETES: ICD-10-CM

## 2025-01-14 RX ORDER — BLOOD SUGAR DIAGNOSTIC
STRIP MISCELLANEOUS
Qty: 100 STRIP | Refills: 1 | OUTPATIENT
Start: 2025-01-14

## 2025-01-27 ENCOUNTER — OFFICE VISIT (OUTPATIENT)
Dept: INTERNAL MEDICINE | Age: 70
End: 2025-01-27
Payer: COMMERCIAL

## 2025-01-27 VITALS
BODY MASS INDEX: 22.36 KG/M2 | RESPIRATION RATE: 18 BRPM | HEIGHT: 64 IN | HEART RATE: 75 BPM | DIASTOLIC BLOOD PRESSURE: 85 MMHG | TEMPERATURE: 97.8 F | OXYGEN SATURATION: 97 % | SYSTOLIC BLOOD PRESSURE: 150 MMHG | WEIGHT: 131 LBS

## 2025-01-27 DIAGNOSIS — K21.9 GASTROESOPHAGEAL REFLUX DISEASE WITHOUT ESOPHAGITIS: ICD-10-CM

## 2025-01-27 DIAGNOSIS — J45.909 UNCOMPLICATED ASTHMA, UNSPECIFIED ASTHMA SEVERITY, UNSPECIFIED WHETHER PERSISTENT: ICD-10-CM

## 2025-01-27 DIAGNOSIS — I10 ESSENTIAL HYPERTENSION: ICD-10-CM

## 2025-01-27 DIAGNOSIS — J41.0 SIMPLE CHRONIC BRONCHITIS (HCC): ICD-10-CM

## 2025-01-27 DIAGNOSIS — Z13.9 SCREENING DUE: Primary | ICD-10-CM

## 2025-01-27 DIAGNOSIS — E78.5 HYPERLIPIDEMIA, UNSPECIFIED HYPERLIPIDEMIA TYPE: ICD-10-CM

## 2025-01-27 DIAGNOSIS — R73.03 PRE-DIABETES: ICD-10-CM

## 2025-01-27 DIAGNOSIS — M79.671 RIGHT FOOT PAIN: ICD-10-CM

## 2025-01-27 PROCEDURE — 99212 OFFICE O/P EST SF 10 MIN: CPT

## 2025-01-27 RX ORDER — BLOOD-GLUCOSE METER
1 KIT MISCELLANEOUS DAILY
Qty: 1 KIT | Refills: 0 | Status: SHIPPED | OUTPATIENT
Start: 2025-01-27

## 2025-01-27 RX ORDER — LANCETS 30 GAUGE
1 EACH MISCELLANEOUS DAILY
Qty: 300 EACH | Refills: 3 | Status: SHIPPED | OUTPATIENT
Start: 2025-01-27 | End: 2025-02-26

## 2025-01-27 RX ORDER — AMLODIPINE BESYLATE 5 MG/1
5 TABLET ORAL DAILY
Qty: 30 TABLET | Refills: 0 | Status: SHIPPED | OUTPATIENT
Start: 2025-01-27

## 2025-01-27 RX ORDER — ALBUTEROL SULFATE 90 UG/1
2 INHALANT RESPIRATORY (INHALATION) 4 TIMES DAILY PRN
Qty: 6.7 EACH | Refills: 3 | Status: SHIPPED | OUTPATIENT
Start: 2025-01-27

## 2025-01-27 RX ORDER — ATORVASTATIN CALCIUM 20 MG/1
20 TABLET, FILM COATED ORAL DAILY
Qty: 90 TABLET | Refills: 1 | Status: SHIPPED | OUTPATIENT
Start: 2025-01-27

## 2025-01-27 RX ORDER — AMLODIPINE BESYLATE 5 MG/1
5 TABLET ORAL DAILY
Qty: 90 TABLET | Refills: 1 | OUTPATIENT
Start: 2025-01-27

## 2025-01-27 RX ORDER — BLOOD PRESSURE TEST KIT
1 KIT MISCELLANEOUS PRN
Qty: 1 KIT | Refills: 0 | Status: SHIPPED | OUTPATIENT
Start: 2025-01-27

## 2025-01-27 RX ORDER — BLOOD SUGAR DIAGNOSTIC
STRIP MISCELLANEOUS
Qty: 50 STRIP | Refills: 1 | Status: SHIPPED | OUTPATIENT
Start: 2025-01-27

## 2025-01-27 RX ORDER — PANTOPRAZOLE SODIUM 40 MG/1
40 TABLET, DELAYED RELEASE ORAL
Qty: 30 TABLET | Refills: 0 | Status: SHIPPED | OUTPATIENT
Start: 2025-01-27 | End: 2026-01-22

## 2025-01-27 RX ORDER — CLONIDINE HYDROCHLORIDE 0.1 MG/1
0.1 TABLET ORAL 2 TIMES DAILY PRN
Qty: 90 TABLET | Refills: 1 | Status: SHIPPED | OUTPATIENT
Start: 2025-01-27

## 2025-01-27 RX ORDER — PANTOPRAZOLE SODIUM 40 MG/1
40 TABLET, DELAYED RELEASE ORAL
Qty: 90 TABLET | Refills: 3 | OUTPATIENT
Start: 2025-01-27

## 2025-01-27 RX ORDER — CHLORHEXIDINE GLUCONATE ORAL RINSE 1.2 MG/ML
SOLUTION DENTAL
COMMUNITY
Start: 2025-01-13

## 2025-01-27 RX ORDER — SUCRALFATE 1 G/1
1 TABLET ORAL 4 TIMES DAILY
Qty: 100 TABLET | Refills: 1 | Status: SHIPPED | OUTPATIENT
Start: 2025-01-27

## 2025-01-27 SDOH — ECONOMIC STABILITY: FOOD INSECURITY: WITHIN THE PAST 12 MONTHS, YOU WORRIED THAT YOUR FOOD WOULD RUN OUT BEFORE YOU GOT MONEY TO BUY MORE.: SOMETIMES TRUE

## 2025-01-27 SDOH — ECONOMIC STABILITY: FOOD INSECURITY: WITHIN THE PAST 12 MONTHS, THE FOOD YOU BOUGHT JUST DIDN'T LAST AND YOU DIDN'T HAVE MONEY TO GET MORE.: SOMETIMES TRUE

## 2025-01-27 ASSESSMENT — PATIENT HEALTH QUESTIONNAIRE - PHQ9
SUM OF ALL RESPONSES TO PHQ9 QUESTIONS 1 & 2: 0
4. FEELING TIRED OR HAVING LITTLE ENERGY: NOT AT ALL
SUM OF ALL RESPONSES TO PHQ QUESTIONS 1-9: 9
9. THOUGHTS THAT YOU WOULD BE BETTER OFF DEAD, OR OF HURTING YOURSELF: NOT AT ALL
SUM OF ALL RESPONSES TO PHQ QUESTIONS 1-9: 9
3. TROUBLE FALLING OR STAYING ASLEEP: NEARLY EVERY DAY
7. TROUBLE CONCENTRATING ON THINGS, SUCH AS READING THE NEWSPAPER OR WATCHING TELEVISION: NOT AT ALL
1. LITTLE INTEREST OR PLEASURE IN DOING THINGS: NOT AT ALL
8. MOVING OR SPEAKING SO SLOWLY THAT OTHER PEOPLE COULD HAVE NOTICED. OR THE OPPOSITE, BEING SO FIGETY OR RESTLESS THAT YOU HAVE BEEN MOVING AROUND A LOT MORE THAN USUAL: NEARLY EVERY DAY
10. IF YOU CHECKED OFF ANY PROBLEMS, HOW DIFFICULT HAVE THESE PROBLEMS MADE IT FOR YOU TO DO YOUR WORK, TAKE CARE OF THINGS AT HOME, OR GET ALONG WITH OTHER PEOPLE: NOT DIFFICULT AT ALL
2. FEELING DOWN, DEPRESSED OR HOPELESS: NOT AT ALL
5. POOR APPETITE OR OVEREATING: NEARLY EVERY DAY
6. FEELING BAD ABOUT YOURSELF - OR THAT YOU ARE A FAILURE OR HAVE LET YOURSELF OR YOUR FAMILY DOWN: NOT AT ALL

## 2025-01-27 ASSESSMENT — ENCOUNTER SYMPTOMS
CONSTIPATION: 0
BACK PAIN: 0
FACIAL SWELLING: 0
RHINORRHEA: 0
EYE ITCHING: 0
VOMITING: 0
SHORTNESS OF BREATH: 0
COUGH: 0
EYE DISCHARGE: 0
COLOR CHANGE: 0
ABDOMINAL PAIN: 0
DIARRHEA: 0

## 2025-01-27 NOTE — PATIENT INSTRUCTIONS
Dear Nahum Jordan,    Thank you for coming to your appointment today. I hope we have addressed all of your needs.     Please make sure to do the following:  - Continue your medications as listed.  - Get labs drawn before our next follow up.  - We will see each other again in 3 months.    Call for a sooner appointment if you develop any new or worsening symptoms.    Have a great day!    Sincerely,  Babar Holt MD  1/27/2025  10:37 AM

## 2025-01-27 NOTE — PROGRESS NOTES
used during appointment. Code [id number].   
    [facility]  Internal Medicine Residency Clinic    Attending Physician Statement  I have discussed the case, including pertinent history and exam findings with the resident physician.  I agree with the assessment, plan and orders as documented by the resident. I have reviewed all pertinent PMHx, PSHx, FamHx, SocialHx, medications, and allergies and updated history as appropriate.    Patient here for routine follow up of medical problems.     BL Ear Pain   -reduced hearing noted by family but not by patient  -no impacted cerumen   -hearing testing and referral to audiology     HTN  -elevated; continue current regimen; elevated during this visit only     Asbestosis   -hx of exposure and bx proven  -will need High resoluton CT scan and PFT this year     Remainder of medical problems as per resident note.    Leo Webb Jr, DO  1/27/25    
esophagitis  -     pantoprazole (PROTONIX) 40 MG tablet; Take 1 tablet by mouth every morning (before breakfast), Disp-30 tablet, R-0Patient going out of country from June 2nd to August 25.Normal  -     sucralfate (CARAFATE) 1 GM tablet; Take 1 tablet by mouth in the morning, at noon, in the evening, and at bedtime, Disp-100 tablet, R-1Normal  8. Simple chronic bronchitis (HCC)       RTC:  Return in about 3 months (around 4/27/2025) for PCP follow up.    I have reviewed my findings and recommendations with Nahum Jordan and Dr. Webb.    Babar Holt MD   1/27/2025 3:18 PM

## 2025-01-28 ENCOUNTER — HOSPITAL ENCOUNTER (OUTPATIENT)
Age: 70
Discharge: HOME | End: 2025-01-28
Payer: COMMERCIAL

## 2025-01-28 DIAGNOSIS — R73.03 PRE-DIABETES: ICD-10-CM

## 2025-01-28 DIAGNOSIS — E78.5 HYPERLIPIDEMIA, UNSPECIFIED HYPERLIPIDEMIA TYPE: ICD-10-CM

## 2025-01-28 DIAGNOSIS — Z13.9 SCREENING DUE: ICD-10-CM

## 2025-01-28 LAB
25(OH)D3 SERPL-MCNC: 16.1 NG/ML (ref 30–100)
CHOLEST SERPL-MCNC: 165 MG/DL
HBA1C MFR BLD: 6.6 % (ref 4–5.6)
HDLC SERPL-MCNC: 41 MG/DL
LDLC SERPL CALC-MCNC: 90 MG/DL
TRIGL SERPL-MCNC: 168 MG/DL
VLDLC SERPL CALC-MCNC: 34 MG/DL

## 2025-01-28 PROCEDURE — 80061 LIPID PANEL: CPT

## 2025-01-28 PROCEDURE — 36415 COLL VENOUS BLD VENIPUNCTURE: CPT

## 2025-01-28 PROCEDURE — 82306 VITAMIN D 25 HYDROXY: CPT

## 2025-01-28 PROCEDURE — 83036 HEMOGLOBIN GLYCOSYLATED A1C: CPT

## 2025-01-28 RX ORDER — ERGOCALCIFEROL 1.25 MG/1
50000 CAPSULE, LIQUID FILLED ORAL WEEKLY
Qty: 8 CAPSULE | Refills: 0 | Status: SHIPPED | OUTPATIENT
Start: 2025-01-28

## 2025-03-07 RX ORDER — CLONIDINE HYDROCHLORIDE 0.1 MG/1
0.1 TABLET ORAL 2 TIMES DAILY PRN
Qty: 180 TABLET | Refills: 1 | OUTPATIENT
Start: 2025-03-07

## 2025-04-10 NOTE — PROGRESS NOTES
[facility]  Internal Medicine Residency Program  ACC Note      SUBJECTIVE:  CC: had concerns including Hypertension.    HPI:Nahum Jordan is a 69 y.o. female who  has a past medical history of Hyperlipidemia, Hypertension, and TB (pulmonary tuberculosis).     Nahum Jordan is here today for routine follow up. She reported feeling well, no issues or cencern at this time. She plans travel to [place] from June to September.     History of postmenopausal bleeding, s/p hysterectomy  Patient requested referral to OB/GYN    Hearing loss, right ear  Patient request referral to ENT      HTN  BP this visit: 126/74   Home monitoring: SBP around 130s to 140s   Medication:  Amlodipine 5mg,  Clonidine 0.1 PRN for SBP > 160 (has not been using recently)   Keep home BP log   Continue same regime   If home BP high, consider increase Amlodipine to 10mg       Hyperlipidemia   atorvastatin 20mg OD     Depression \"provoked by bereavement\", was on zoloft 25mg  Not taking at present, no mood symptoms currently.       H/O asthma   Was on albuterol PRN; has not been using recently       H/O asbestos exposure:   CT chest WO contrast (10/14/2024):   1. Bilateral calcified and noncalcified pleural plaques consistent with  asbestos exposure.  2. Stable right upper lung area of pedunculated nodularity extending along  the medial aspect of the pleural plaque 2.1 cm.  Area is grossly unchanged  from prior comparison including pedunculated area.  3. Left upper lobe sub solid or ground-glass appearing 2.8 cm area of  nodularity.  This is also unchanged from prior comparison overall size or  appearance somewhat indeterminate with close interval attention on follow-up   PFT and chest CT ordered    Referred to pulmonology    HCM:  Mammogram May 2024 showed No mammographic evidence of malignancy.     Health Maintenance Due   Topic Date Due    DTaP/Tdap/Td vaccine (1 - Tdap) Never done    Pneumococcal 50+ years Vaccine (1 of 2 - PCV) Never

## 2025-04-11 ENCOUNTER — OFFICE VISIT (OUTPATIENT)
Dept: INTERNAL MEDICINE | Age: 70
End: 2025-04-11
Payer: COMMERCIAL

## 2025-04-11 VITALS
OXYGEN SATURATION: 97 % | BODY MASS INDEX: 22.36 KG/M2 | WEIGHT: 131 LBS | HEIGHT: 64 IN | SYSTOLIC BLOOD PRESSURE: 126 MMHG | DIASTOLIC BLOOD PRESSURE: 74 MMHG | TEMPERATURE: 97.8 F | HEART RATE: 98 BPM | RESPIRATION RATE: 18 BRPM

## 2025-04-11 DIAGNOSIS — E78.5 HYPERLIPIDEMIA, UNSPECIFIED HYPERLIPIDEMIA TYPE: ICD-10-CM

## 2025-04-11 DIAGNOSIS — Z90.710 H/O ABDOMINAL HYSTERECTOMY: ICD-10-CM

## 2025-04-11 DIAGNOSIS — H91.91 HEARING LOSS OF RIGHT EAR, UNSPECIFIED HEARING LOSS TYPE: ICD-10-CM

## 2025-04-11 DIAGNOSIS — J92.0 PLEURAL PLAQUE DUE TO ASBESTOS EXPOSURE: Primary | ICD-10-CM

## 2025-04-11 DIAGNOSIS — I10 ESSENTIAL HYPERTENSION: ICD-10-CM

## 2025-04-11 DIAGNOSIS — R73.03 PRE-DIABETES: ICD-10-CM

## 2025-04-11 DIAGNOSIS — M79.671 RIGHT FOOT PAIN: ICD-10-CM

## 2025-04-11 PROCEDURE — 3017F COLORECTAL CA SCREEN DOC REV: CPT

## 2025-04-11 PROCEDURE — 1036F TOBACCO NON-USER: CPT

## 2025-04-11 PROCEDURE — G8420 CALC BMI NORM PARAMETERS: HCPCS

## 2025-04-11 PROCEDURE — 3074F SYST BP LT 130 MM HG: CPT

## 2025-04-11 PROCEDURE — 1090F PRES/ABSN URINE INCON ASSESS: CPT

## 2025-04-11 PROCEDURE — 3078F DIAST BP <80 MM HG: CPT

## 2025-04-11 PROCEDURE — G8427 DOCREV CUR MEDS BY ELIG CLIN: HCPCS

## 2025-04-11 PROCEDURE — G8400 PT W/DXA NO RESULTS DOC: HCPCS

## 2025-04-11 PROCEDURE — 99213 OFFICE O/P EST LOW 20 MIN: CPT

## 2025-04-11 PROCEDURE — 1123F ACP DISCUSS/DSCN MKR DOCD: CPT

## 2025-04-11 RX ORDER — AMLODIPINE BESYLATE 5 MG/1
5 TABLET ORAL DAILY
Qty: 90 TABLET | Refills: 2 | Status: SHIPPED | OUTPATIENT
Start: 2025-04-11

## 2025-04-11 RX ORDER — BLOOD SUGAR DIAGNOSTIC
STRIP MISCELLANEOUS
Qty: 100 STRIP | Refills: 1 | Status: SHIPPED | OUTPATIENT
Start: 2025-04-11

## 2025-04-11 SDOH — ECONOMIC STABILITY: HOUSING INSECURITY: IN THE LAST 12 MONTHS, WAS THERE A TIME WHEN YOU WERE NOT ABLE TO PAY THE MORTGAGE OR RENT ON TIME?: NO

## 2025-04-11 SDOH — ECONOMIC STABILITY: FOOD INSECURITY: WITHIN THE PAST 12 MONTHS, THE FOOD YOU BOUGHT JUST DIDN'T LAST AND YOU DIDN'T HAVE MONEY TO GET MORE.: NEVER TRUE

## 2025-04-11 SDOH — ECONOMIC STABILITY: FOOD INSECURITY: HOW HARD IS IT FOR YOU TO PAY FOR THE VERY BASICS LIKE FOOD, HOUSING, MEDICAL CARE, AND HEATING?: NOT HARD AT ALL

## 2025-04-11 SDOH — ECONOMIC STABILITY: FOOD INSECURITY: WITHIN THE PAST 12 MONTHS, YOU WORRIED THAT YOUR FOOD WOULD RUN OUT BEFORE YOU GOT THE MONEY TO BUY MORE.: NEVER TRUE

## 2025-04-11 SDOH — ECONOMIC STABILITY: TRANSPORTATION INSECURITY: IN THE PAST 12 MONTHS, HAS LACK OF TRANSPORTATION KEPT YOU FROM MEDICAL APPOINTMENTS OR FROM GETTING MEDICATIONS?: NO

## 2025-04-11 ASSESSMENT — PATIENT HEALTH QUESTIONNAIRE - PHQ9
SUM OF ALL RESPONSES TO PHQ QUESTIONS 1-9: 0
4. FEELING TIRED OR HAVING LITTLE ENERGY: NOT AT ALL
SUM OF ALL RESPONSES TO PHQ QUESTIONS 1-9: 0
2. FEELING DOWN, DEPRESSED OR HOPELESS: NOT AT ALL
5. POOR APPETITE OR OVEREATING: NOT AT ALL
7. TROUBLE CONCENTRATING ON THINGS, SUCH AS READING THE NEWSPAPER OR WATCHING TELEVISION: NOT AT ALL
6. FEELING BAD ABOUT YOURSELF - OR THAT YOU ARE A FAILURE OR HAVE LET YOURSELF OR YOUR FAMILY DOWN: NOT AT ALL
9. THOUGHTS THAT YOU WOULD BE BETTER OFF DEAD, OR OF HURTING YOURSELF: NOT AT ALL
3. TROUBLE FALLING OR STAYING ASLEEP: NOT AT ALL
10. IF YOU CHECKED OFF ANY PROBLEMS, HOW DIFFICULT HAVE THESE PROBLEMS MADE IT FOR YOU TO DO YOUR WORK, TAKE CARE OF THINGS AT HOME, OR GET ALONG WITH OTHER PEOPLE: NOT DIFFICULT AT ALL
1. LITTLE INTEREST OR PLEASURE IN DOING THINGS: NOT AT ALL
SUM OF ALL RESPONSES TO PHQ QUESTIONS 1-9: 0
8. MOVING OR SPEAKING SO SLOWLY THAT OTHER PEOPLE COULD HAVE NOTICED. OR THE OPPOSITE, BEING SO FIGETY OR RESTLESS THAT YOU HAVE BEEN MOVING AROUND A LOT MORE THAN USUAL: NOT AT ALL
SUM OF ALL RESPONSES TO PHQ QUESTIONS 1-9: 0

## 2025-04-11 ASSESSMENT — ACTIVITIES OF DAILY LIVING (ADL): LACK_OF_TRANSPORTATION: NO

## 2025-04-11 ASSESSMENT — ENCOUNTER SYMPTOMS
CHEST TIGHTNESS: 0
COUGH: 0
VOMITING: 0
ABDOMINAL PAIN: 0
WHEEZING: 0
NAUSEA: 0

## 2025-04-11 ASSESSMENT — LIFESTYLE VARIABLES
HOW MANY STANDARD DRINKS CONTAINING ALCOHOL DO YOU HAVE ON A TYPICAL DAY: PATIENT DOES NOT DRINK
HOW OFTEN DO YOU HAVE A DRINK CONTAINING ALCOHOL: NEVER

## 2025-04-11 NOTE — PATIENT INSTRUCTIONS
Dear Nahum Jordan,    Thank you for coming to your appointment today. I hope we have addressed all of your needs.     Please make sure to do the following:  - Continue your medications as listed.  - Get labs drawn before our next follow up. We will call you with the results   - Referrals have been made to [facility]:  If you do not hear from the office in 1 week, please call the number listed.  - We will see each other again in 5 months     Call for a sooner appointment if you develop worsen of symptoms    Have a great day!        Sincerely,  Eric Pacheco MD  4/11/2025  9:48 AM

## 2025-04-11 NOTE — PROGRESS NOTES
[facility]  Internal Medicine Residency Clinic    Attending Physician Statement  I have discussed the case, including pertinent history and exam findings with the resident physician. I agree with the assessment, plan and orders as documented by the resident. I have reviewed the relevant PMHx, PSHx, FamHx, SocialHx, medications, and allergies and updated history as appropriate.    Patient presents for routine follow up of medical problems.  Case discussed with PGY 1  Data reviewed in detail  Agree with her recommendations and plans    Remainder of medical problems as per resident note.    Mary Hernández MD  4/11/2025 2:11 PM

## 2025-04-15 ENCOUNTER — TELEPHONE (OUTPATIENT)
Dept: ENT CLINIC | Age: 70
End: 2025-04-15

## 2025-04-17 ENCOUNTER — RESULTS FOLLOW-UP (OUTPATIENT)
Dept: INTERNAL MEDICINE | Age: 70
End: 2025-04-17

## 2025-04-17 ENCOUNTER — CLINICAL SUPPORT (OUTPATIENT)
Dept: INTERNAL MEDICINE | Age: 70
End: 2025-04-17
Payer: COMMERCIAL

## 2025-04-17 ENCOUNTER — TELEPHONE (OUTPATIENT)
Dept: INTERNAL MEDICINE | Age: 70
End: 2025-04-17

## 2025-04-17 DIAGNOSIS — I10 PRIMARY HYPERTENSION: ICD-10-CM

## 2025-04-17 DIAGNOSIS — J98.4 RESTRICTIVE LUNG DISEASE: ICD-10-CM

## 2025-04-17 DIAGNOSIS — J98.4 RESTRICTIVE LUNG DISEASE: Primary | ICD-10-CM

## 2025-04-17 LAB
ALBUMIN: 4.6 G/DL (ref 3.5–5.2)
ALP BLD-CCNC: 117 U/L (ref 35–104)
ALT SERPL-CCNC: 29 U/L (ref 0–35)
ANION GAP SERPL CALCULATED.3IONS-SCNC: 9 MMOL/L (ref 7–16)
AST SERPL-CCNC: 24 U/L (ref 0–35)
BASOPHILS ABSOLUTE: 0.05 K/UL (ref 0–0.2)
BASOPHILS RELATIVE PERCENT: 1 % (ref 0–2)
BILIRUB SERPL-MCNC: 0.9 MG/DL (ref 0–1.2)
BUN BLDV-MCNC: 14 MG/DL (ref 8–23)
CALCIUM SERPL-MCNC: 9.6 MG/DL (ref 8.8–10.2)
CHLORIDE BLD-SCNC: 103 MMOL/L (ref 98–107)
CHOLESTEROL, FASTING: 189 MG/DL
CO2: 30 MMOL/L (ref 22–29)
CREAT SERPL-MCNC: 0.5 MG/DL (ref 0.5–1)
EOSINOPHILS ABSOLUTE: 0.15 K/UL (ref 0.05–0.5)
EOSINOPHILS RELATIVE PERCENT: 3 % (ref 0–6)
GFR, ESTIMATED: >90 ML/MIN/1.73M2
GLUCOSE BLD-MCNC: 110 MG/DL (ref 74–99)
HCT VFR BLD CALC: 44.3 % (ref 34–48)
HDLC SERPL-MCNC: 44 MG/DL
HEMOGLOBIN: 14.4 G/DL (ref 11.5–15.5)
IMMATURE GRANULOCYTES %: 0 % (ref 0–5)
IMMATURE GRANULOCYTES ABSOLUTE: <0.03 K/UL (ref 0–0.58)
LDL CHOLESTEROL: 82 MG/DL
LYMPHOCYTES ABSOLUTE: 2.5 K/UL (ref 1.5–4)
LYMPHOCYTES RELATIVE PERCENT: 44 % (ref 20–42)
MCH RBC QN AUTO: 29.8 PG (ref 26–35)
MCHC RBC AUTO-ENTMCNC: 32.5 G/DL (ref 32–34.5)
MCV RBC AUTO: 91.5 FL (ref 80–99.9)
MONOCYTES ABSOLUTE: 0.46 K/UL (ref 0.1–0.95)
MONOCYTES RELATIVE PERCENT: 8 % (ref 2–12)
NEUTROPHILS ABSOLUTE: 2.49 K/UL (ref 1.8–7.3)
NEUTROPHILS RELATIVE PERCENT: 44 % (ref 43–80)
PDW BLD-RTO: 13.6 % (ref 11.5–15)
PLATELET # BLD: 280 K/UL (ref 130–450)
PMV BLD AUTO: 11 FL (ref 7–12)
POTASSIUM SERPL-SCNC: 4.4 MMOL/L (ref 3.4–4.5)
RBC # BLD: 4.84 M/UL (ref 3.5–5.5)
SODIUM BLD-SCNC: 141 MMOL/L (ref 136–145)
TOTAL PROTEIN: 7.5 G/DL (ref 6.4–8.3)
TRIGLYCERIDE, FASTING: 316 MG/DL
VLDLC SERPL CALC-MCNC: 63 MG/DL
WBC # BLD: 5.7 K/UL (ref 4.5–11.5)

## 2025-04-17 PROCEDURE — 36415 COLL VENOUS BLD VENIPUNCTURE: CPT

## 2025-04-17 NOTE — PROGRESS NOTES
Labs drawn via venipuncture. Pressure applied to site  for 3 minutes. Patient tolerated well.1 green & 1 lav tube sent via tubing system.

## 2025-04-17 NOTE — TELEPHONE ENCOUNTER
Labs ordered for clinic collect    Electronically signed by Bunny Judd DO on 4/17/2025 at 8:56 AM

## 2025-04-21 VITALS — TEMPERATURE: 97.3 F

## 2025-04-21 DIAGNOSIS — K21.9 GASTROESOPHAGEAL REFLUX DISEASE WITHOUT ESOPHAGITIS: ICD-10-CM

## 2025-04-21 RX ORDER — SUCRALFATE 1 G/1
1 TABLET ORAL 4 TIMES DAILY
Qty: 100 TABLET | Refills: 1 | OUTPATIENT
Start: 2025-04-21

## 2025-04-28 RX ORDER — CLONIDINE HYDROCHLORIDE 0.1 MG/1
0.1 TABLET ORAL 2 TIMES DAILY PRN
Qty: 90 TABLET | Refills: 1 | Status: SHIPPED | OUTPATIENT
Start: 2025-04-28

## 2025-04-28 NOTE — TELEPHONE ENCOUNTER
Name of Medication(s) Requested:  Requested Prescriptions     Pending Prescriptions Disp Refills    cloNIDine (CATAPRES) 0.1 MG tablet [Pharmacy Med Name: CLONIDINE HCL 0.1 MG TABLET] 90 tablet 1     Sig: TAKE 1 TABLET BY MOUTH 2 TIMES DAILY AS NEEDED FOR HIGH BLOOD PRESSURE IF SYSTOLIC BP >160       Medication is on current medication list Yes    Dosage and directions were verified? Yes    Quantity verified: 90 day supply     Pharmacy Verified?  Yes    Last Appointment:  1/27/2025    Future appts:  Future Appointments   Date Time Provider Department Center   5/21/2025 11:00 AM Sabino Shields MD [facility]   9/15/2025  8:45 AM SCHEDULE, LUIS ANTONIO [place] [facility]UD[facility]   9/15/2025  9:00 AM Paola Olivier APRN - CNP [place] [facility]        (If no appt send self scheduling link. .REFILLAPPT)  Scheduling request sent?     [] Yes  [x] No    Does patient need updated?  [] Yes  [x] No

## 2025-05-11 DIAGNOSIS — K21.9 GASTROESOPHAGEAL REFLUX DISEASE WITHOUT ESOPHAGITIS: ICD-10-CM

## 2025-05-12 RX ORDER — SUCRALFATE 1 G/1
1 TABLET ORAL 4 TIMES DAILY
Qty: 100 TABLET | Refills: 1 | OUTPATIENT
Start: 2025-05-12

## 2025-05-21 ENCOUNTER — OFFICE VISIT (OUTPATIENT)
Dept: OBGYN | Age: 70
End: 2025-05-21
Payer: COMMERCIAL

## 2025-05-21 VITALS
BODY MASS INDEX: 21.8 KG/M2 | HEART RATE: 72 BPM | DIASTOLIC BLOOD PRESSURE: 67 MMHG | WEIGHT: 127 LBS | SYSTOLIC BLOOD PRESSURE: 134 MMHG

## 2025-05-21 DIAGNOSIS — Z01.419 WELL WOMAN EXAM WITH ROUTINE GYNECOLOGICAL EXAM: Primary | ICD-10-CM

## 2025-05-21 PROCEDURE — 1090F PRES/ABSN URINE INCON ASSESS: CPT | Performed by: STUDENT IN AN ORGANIZED HEALTH CARE EDUCATION/TRAINING PROGRAM

## 2025-05-21 PROCEDURE — 3075F SYST BP GE 130 - 139MM HG: CPT | Performed by: STUDENT IN AN ORGANIZED HEALTH CARE EDUCATION/TRAINING PROGRAM

## 2025-05-21 PROCEDURE — G8427 DOCREV CUR MEDS BY ELIG CLIN: HCPCS | Performed by: STUDENT IN AN ORGANIZED HEALTH CARE EDUCATION/TRAINING PROGRAM

## 2025-05-21 PROCEDURE — 3017F COLORECTAL CA SCREEN DOC REV: CPT | Performed by: STUDENT IN AN ORGANIZED HEALTH CARE EDUCATION/TRAINING PROGRAM

## 2025-05-21 PROCEDURE — 1036F TOBACCO NON-USER: CPT | Performed by: STUDENT IN AN ORGANIZED HEALTH CARE EDUCATION/TRAINING PROGRAM

## 2025-05-21 PROCEDURE — 3078F DIAST BP <80 MM HG: CPT | Performed by: STUDENT IN AN ORGANIZED HEALTH CARE EDUCATION/TRAINING PROGRAM

## 2025-05-21 PROCEDURE — 99203 OFFICE O/P NEW LOW 30 MIN: CPT | Performed by: STUDENT IN AN ORGANIZED HEALTH CARE EDUCATION/TRAINING PROGRAM

## 2025-05-21 PROCEDURE — G8420 CALC BMI NORM PARAMETERS: HCPCS | Performed by: STUDENT IN AN ORGANIZED HEALTH CARE EDUCATION/TRAINING PROGRAM

## 2025-05-21 PROCEDURE — 99459 PELVIC EXAMINATION: CPT | Performed by: STUDENT IN AN ORGANIZED HEALTH CARE EDUCATION/TRAINING PROGRAM

## 2025-05-21 PROCEDURE — 1123F ACP DISCUSS/DSCN MKR DOCD: CPT | Performed by: STUDENT IN AN ORGANIZED HEALTH CARE EDUCATION/TRAINING PROGRAM

## 2025-05-21 PROCEDURE — G8400 PT W/DXA NO RESULTS DOC: HCPCS | Performed by: STUDENT IN AN ORGANIZED HEALTH CARE EDUCATION/TRAINING PROGRAM

## 2025-05-21 NOTE — PROGRESS NOTES
HISTORY OF PRESENT ILLNESS:    69 y.o. female   presents for her annual exam.     No LMP recorded. Patient has had a hysterectomy. Due to DIONY-3 in 2018  Most recent mammogram: by pcp. Up to date    Changes to health since last visit: no  Complaints: no      Past Medical History:   Past Medical History:   Diagnosis Date    Hyperlipidemia     Hypertension     TB (pulmonary tuberculosis)                                              OB History    Para Term  AB Living   1 0 0      SAB IAB Ectopic Molar Multiple Live Births        1      # Outcome Date GA Lbr Hunter/2nd Weight Sex Type Anes PTL Lv   1   40w0d   F Vag-Spont             Past Surgical History:   Past Surgical History:   Procedure Laterality Date    BRONCHOSCOPY  2016    BRONCHOSCOPY N/A 09/15/2020    BRONCHOSCOPY W/EBUS FNA performed by Lupe Brennan MD at [facility] ENDOSCOPY    BRONCHOSCOPY  09/15/2020    BRONCHOSCOPY THERAPUTIC ASPIRATION INITIAL performed by Lupe Brennan MD at [facility] ENDOSCOPY    BRONCHOSCOPY  09/15/2020    BRONCHOSCOPY ALVEOLAR LAVAGE performed by Lupe Brennan MD at [facility] ENDOSCOPY    BRONCHOSCOPY  09/15/2020    BRONCHOSCOPY ADD ON COMPUTER ASSISTED performed by Lupe Brennan MD at [facility] ENDOSCOPY    BRONCHOSCOPY  09/15/2020    BRONCHOSCOPY/TRANSBRONCHIAL NEEDLE BIOPSY performed by Lupe Brennan MD at [facility] ENDOSCOPY    COLONOSCOPY N/A 2021    COLONOSCOPY POLYPECTOMY SNARE/COLD BIOPSY performed by Leo Padilla MD at [facility] ENDOSCOPY    HYSTERECTOMY (CERVIX STATUS UNKNOWN)      [facility]  2014    EXAM    UPPER GASTROINTESTINAL ENDOSCOPY N/A 2024    ESOPHAGOGASTRODUODENOSCOPY, POSSIBLE BIOPSY performed by Leo Padilla MD at [facility] ENDOSCOPY        Allergies: Shellfish-derived products     Medications:   Current Outpatient Medications   Medication Sig Dispense Refill    cloNIDine (CATAPRES) 0.1 MG tablet TAKE 1 TABLET BY MOUTH 2 TIMES DAILY AS NEEDED FOR HIGH BLOOD PRESSURE IF SYSTOLIC BP

## 2025-05-21 NOTE — PROGRESS NOTES
New patient alert and pleasant with no complaints.  Here today for annual GYN exam.  Assisted with Pelvic exam, pap smear obtained, labeled  and delivered to lab.  Discharge instructions have been discussed with the patient. Patient advised to call our office with any questions or concerns.   Voiced understanding.    [id number] assisted with this visit

## 2025-05-23 DIAGNOSIS — K21.9 GASTROESOPHAGEAL REFLUX DISEASE WITHOUT ESOPHAGITIS: ICD-10-CM

## 2025-05-23 RX ORDER — PANTOPRAZOLE SODIUM 40 MG/1
40 TABLET, DELAYED RELEASE ORAL
Qty: 30 TABLET | Refills: 3 | Status: SHIPPED | OUTPATIENT
Start: 2025-05-23

## 2025-05-23 RX ORDER — PANTOPRAZOLE SODIUM 40 MG/1
40 TABLET, DELAYED RELEASE ORAL
Qty: 30 TABLET | Refills: 3 | Status: SHIPPED | OUTPATIENT
Start: 2025-05-23 | End: 2026-05-18

## 2025-05-23 NOTE — TELEPHONE ENCOUNTER
Name of Medication(s) Requested:  Requested Prescriptions     Pending Prescriptions Disp Refills    pantoprazole (PROTONIX) 40 MG tablet [Pharmacy Med Name: PANTOPRAZOLE SOD DR 40 MG TAB] 30 tablet 0     Sig: TAKE 1 TABLET BY MOUTH EVERY DAY BEFORE BREAKFAST       Medication is on current medication list Yes    Dosage and directions were verified? Yes    Quantity verified: 30 day supply     Pharmacy Verified?  Yes    Last Appointment:  1/27/2025    Future appts:  Future Appointments   Date Time Provider Department Center   9/3/2025  9:00 AM Dana West MD [place] [facility]   9/15/2025  8:45 AM SCHEDULE, MHYX [place] [facility]   9/15/2025  9:00 AM Paola Olivier APRN - CNP [place] [facility]   7/13/2026  8:00 AM Sabino Shields MD [facility]        (If no appt send self scheduling link. .REFILLAPPT)  Scheduling request sent?     [] Yes  [x] No    Does patient need updated?  [x] Yes  [] No

## 2025-05-28 DIAGNOSIS — J45.909 UNCOMPLICATED ASTHMA, UNSPECIFIED ASTHMA SEVERITY, UNSPECIFIED WHETHER PERSISTENT: ICD-10-CM

## 2025-05-28 LAB — GYNECOLOGY CYTOLOGY REPORT: NORMAL

## 2025-05-28 NOTE — TELEPHONE ENCOUNTER
Last Appointment:  4/11/2025  Future Appointments   Date Time Provider Department Center   9/3/2025  9:00 AM Dana West MD [place] [facility]   9/15/2025  8:45 AM SCHEDULE, LUIS ANTONIO [place] AUDIOLOGY [facility]   9/15/2025  9:00 AM Paola Olivier, APRN - CNP [place] [facility]   7/13/2026  8:00 AM Sabino Shields MD [facility]      Albuterol filled 1/27/25 6.7 g 3 refills = 120 days  Due back in September

## 2025-05-29 RX ORDER — ALBUTEROL SULFATE 90 UG/1
2 INHALANT RESPIRATORY (INHALATION) 4 TIMES DAILY PRN
Qty: 18 G | Refills: 0 | Status: SHIPPED | OUTPATIENT
Start: 2025-05-29

## 2025-05-31 ENCOUNTER — RESULTS FOLLOW-UP (OUTPATIENT)
Dept: OBGYN | Age: 70
End: 2025-05-31

## 2025-06-02 NOTE — RESULT ENCOUNTER NOTE
Patient notified. Call placed with help from [facility] [employer].     Name: Yokasta     ID:  [id number]

## 2025-06-27 DIAGNOSIS — J45.909 UNCOMPLICATED ASTHMA, UNSPECIFIED ASTHMA SEVERITY, UNSPECIFIED WHETHER PERSISTENT: ICD-10-CM

## 2025-06-27 NOTE — TELEPHONE ENCOUNTER
Name of Medication(s) Requested:  Requested Prescriptions     Pending Prescriptions Disp Refills    albuterol sulfate HFA (PROVENTIL;VENTOLIN;PROAIR) 108 (90 Base) MCG/ACT inhaler [Pharmacy Med Name: ALBUTEROL HFA (VENTOLIN) INH] 18 each 2     Sig: INHALE 2 PUFFS INTO THE LUNGS 4 TIMES DAILY AS NEEDED FOR WHEEZING.       Medication is on current medication list Yes    Dosage and directions were verified? Yes    Quantity verified: 90 day supply     Pharmacy Verified?  Yes    Last Appointment:  Visit date not found    Future appts:  Future Appointments   Date Time Provider Department Center   9/3/2025  9:00 AM Dana West MD [place] [facility]   9/15/2025  8:45 AM SCHEDULE, MHYX [place] [facility]UD[facility]   9/15/2025  9:00 AM Paola Olivier APRN - CNP [place] [facility]   7/13/2026  8:00 AM Sabino Shields MD [facility]        (If no appt send self scheduling link. .REFILLAPPT)  Scheduling request sent?     [] Yes  [x] No    Does patient need updated?  [] Yes  [x] No

## 2025-07-07 RX ORDER — ALBUTEROL SULFATE 90 UG/1
2 INHALANT RESPIRATORY (INHALATION) 4 TIMES DAILY PRN
Qty: 18 EACH | Refills: 2 | Status: SHIPPED | OUTPATIENT
Start: 2025-07-07

## 2025-07-28 RX ORDER — CLONIDINE HYDROCHLORIDE 0.1 MG/1
0.1 TABLET ORAL 2 TIMES DAILY PRN
Qty: 180 TABLET | Refills: 1 | Status: SHIPPED | OUTPATIENT
Start: 2025-07-28

## 2025-07-28 NOTE — TELEPHONE ENCOUNTER
Name of Medication(s) Requested:  Requested Prescriptions     Pending Prescriptions Disp Refills    cloNIDine (CATAPRES) 0.1 MG tablet [Pharmacy Med Name: CLONIDINE HCL 0.1 MG TABLET] 180 tablet      Sig: TAKE 1 TABLET BY MOUTH 2 TIMES DAILY AS NEEDED FOR HIGH BLOOD PRESSURE IF SYSTOLIC BP >160       Medication is on current medication list Yes    Dosage and directions were verified? Yes    Quantity verified: 90 day supply     Pharmacy Verified?  Yes    Last Appointment:  04/11/2025    Future appts:  Future Appointments   Date Time Provider Department Center   8/22/2025  8:30 AM Babar Holt MD [facility]   9/3/2025  9:00 AM Dana West MD [place] [facility]   9/15/2025  8:45 AM SCHEDULE, LUIS ANTONIO [place] AUD[facility]   9/15/2025  9:00 AM Paola Olivier APRN - CNP [place] [facility]   7/13/2026  8:00 AM Sabino Shields MD [facility]        (If no appt send self scheduling link. .REFILLAPPT)  Scheduling request sent?     [] Yes  [x] No    Does patient need updated?  [] Yes  [x] No

## (undated) DEVICE — FORCEPS BX L160CM JAW DIA2.4MM YEL L CAP W/ NDL DISP RAD

## (undated) DEVICE — ADAPTER BRONCHSCP FOR USE W/ OLY EDGE

## (undated) DEVICE — Z DUP USE 2381766 KIT PROC W/ 190DEG FIRM TIP EXT WRK CHN LOCATABLE GUID FOR [SDK4190FT] [MEDTRONIC USA INC]

## (undated) DEVICE — NEEDLE CYTO 21GA L137MM DIA1.9MM PULM BRAID TAPR SHTH OPT 5PK

## (undated) DEVICE — NEEDLE ASPIR 22GA L40MM WRK L70CM SYR VLV ECHOGENIC DIMPLED

## (undated) DEVICE — SINGLE USE SUCTION VALVE MAJ-209: Brand: SINGLE USE SUCTION VALVE (STERILE)

## (undated) DEVICE — GAUZE,SPONGE,4"X4",8PLY,STRL,LF,10/TRAY: Brand: MEDLINE

## (undated) DEVICE — Device: Brand: BALLOON

## (undated) DEVICE — CANNULA NSL ORAL AD FOR CAPNOFLEX CO2 O2 AIRLFE

## (undated) DEVICE — BITEBLOCK 54FR W/ DENT RIM BLOX

## (undated) DEVICE — DEFENDO AIR WATER SUCTION AND BIOPSY VALVE KIT FOR  OLYMPUS: Brand: DEFENDO AIR/WATER/SUCTION AND BIOPSY VALVE

## (undated) DEVICE — CONTAINER SPEC 60ML PH 7NEUTRAL BUFF FRMLN RDY TO USE

## (undated) DEVICE — SOLUTION IV IRRIG 500ML 0.9% SODIUM CHL 2F7123

## (undated) DEVICE — SURGICAL PROCEDURE PACK BRONCH

## (undated) DEVICE — GAUZE,SPONGE,POST-OP,4X3,STRL,LF: Brand: MEDLINE

## (undated) DEVICE — ADAPTER TBNG DIA15MM SWVL FBROPT BRONCHSCP TERM 2 AXIS PEEP

## (undated) DEVICE — SNARE VASC L240CM LOOP W10MM SHTH DIA2.4MM RND STIFF CLD

## (undated) DEVICE — TRAP POLYP ETRAP

## (undated) DEVICE — SINGLE USE BIOPSY VALVE MAJ-210: Brand: SINGLE USE BIOPSY VALVE (STERILE)

## (undated) DEVICE — CONNECTOR IRRIGATION AUXILIARY H2O JET W/ PRT MTL THRD HYDR